# Patient Record
Sex: FEMALE | Race: WHITE | Employment: OTHER | URBAN - METROPOLITAN AREA
[De-identification: names, ages, dates, MRNs, and addresses within clinical notes are randomized per-mention and may not be internally consistent; named-entity substitution may affect disease eponyms.]

---

## 2017-01-18 ENCOUNTER — OFFICE VISIT (OUTPATIENT)
Dept: FAMILY MEDICINE CLINIC | Age: 62
End: 2017-01-18

## 2017-01-18 VITALS
DIASTOLIC BLOOD PRESSURE: 93 MMHG | HEIGHT: 65 IN | WEIGHT: 156 LBS | SYSTOLIC BLOOD PRESSURE: 121 MMHG | HEART RATE: 86 BPM | TEMPERATURE: 97.9 F | RESPIRATION RATE: 16 BRPM | BODY MASS INDEX: 25.99 KG/M2

## 2017-01-18 DIAGNOSIS — R22.9 LOCALIZED SKIN MASS, LUMP, OR SWELLING: Primary | ICD-10-CM

## 2017-01-18 RX ORDER — GABAPENTIN 100 MG/1
100 CAPSULE ORAL DAILY
COMMUNITY
Start: 2016-10-12 | End: 2017-10-31

## 2017-01-18 NOTE — MR AVS SNAPSHOT
Visit Information Date & Time Provider Department Dept. Phone Encounter #  
 1/18/2017  9:15 AM Luna RegaladoRoxanne 34 [id number] Upcoming Health Maintenance Date Due  
 BREAST CANCER SCRN MAMMOGRAM 11/30/2017 PAP AKA CERVICAL CYTOLOGY 10/19/2018 COLONOSCOPY 2/23/2020 DTaP/Tdap/Td series (2 - Td) 9/29/2025 Allergies as of 1/18/2017  Review Complete On: 1/18/2017 By: Luna Regalado MD  
 No Known Allergies Current Immunizations  Reviewed on 9/29/2015 Name Date Tdap 9/29/2015 Not reviewed this visit You Were Diagnosed With   
  
 Codes Comments Localized skin mass, lump, or swelling    -  Primary ICD-10-CM: R22.9 ICD-9-CM: 073. 2 Vitals BP Pulse Temp Resp Height(growth percentile) Weight(growth percentile) (!) 121/93 (BP 1 Location: Left arm, BP Patient Position: Sitting) 86 97.9 °F (36.6 °C) (Oral) 16 5' 5\" (1.651 m) 156 lb (70.8 kg) LMP BMI OB Status Smoking Status 01/01/2009 25.96 kg/m2 Postmenopausal Former Smoker BMI and BSA Data Body Mass Index Body Surface Area  
 25.96 kg/m 2 1.8 m 2 Preferred Pharmacy Pharmacy Name Phone [facility] [address], [address] [place] [address] Sara Carlines [phone] Your Updated Medication List  
  
   
This list is accurate as of: 1/18/17  9:54 AM.  Always use your most recent med list.  
  
  
  
  
 ALPRAZolam 0.25 mg tablet Commonly known as:  Kane Sequin Take 1 Tab by mouth two (2) times daily as needed for Anxiety. buPROPion  mg tablet Commonly known as:  Etwashington Pickler Take 1 tablet by mouth daily. CALCIUM + D PO Take  by mouth. cetirizine 10 mg tablet Commonly known as:  ZYRTEC  
TAKE ONE TABLET BY MOUTH DAILY AS NEEDED  
  
 gabapentin 100 mg capsule Commonly known as:  NEURONTIN Take 100 mg by mouth daily. simvastatin 40 mg tablet Commonly known as:  ZOCOR  
 TAKE ONE TABLET BY MOUTH EVERY NIGHT AS DIRECTED  
  
 venlafaxine-SR 75 mg capsule Commonly known as:  EFFEXOR-XR  
TAKE ONE CAPSULE BY MOUTH DAILY To-Do List   
 01/19/2017 Imaging:  US THYROID/PARATHYROID/SOFT TISS Introducing [facility]! Dear Jerardo Jason: Thank you for requesting a Roadmap account. Our records indicate that you have previously registered for a Roadmap account but its currently inactive. Please call our Roadmap support line at [phone]. Additional Information If you have questions, please visit the Frequently Asked Questions section of the Roadmap website at [email or web address] Remember, Roadmap is NOT to be used for urgent needs. For medical emergencies, dial 911. Now available from your iPhone and Android! Please provide this summary of care documentation to your next provider. Your primary care clinician is listed as Brionna Penn. If you have any questions after today's visit, please call [phone].

## 2017-01-24 ENCOUNTER — HOSPITAL ENCOUNTER (OUTPATIENT)
Dept: ULTRASOUND IMAGING | Age: 62
Discharge: HOME | End: 2017-01-24
Attending: FAMILY MEDICINE
Payer: OTHER GOVERNMENT

## 2017-01-24 ENCOUNTER — TELEPHONE (OUTPATIENT)
Dept: FAMILY MEDICINE CLINIC | Age: 62
End: 2017-01-24

## 2017-01-24 DIAGNOSIS — R22.9 LOCALIZED SKIN MASS, LUMP, OR SWELLING: ICD-10-CM

## 2017-01-24 PROCEDURE — 76536 US EXAM OF HEAD AND NECK: CPT

## 2017-01-25 NOTE — TELEPHONE ENCOUNTER
Please call patient and let her know that her ultrasound shows that the mass we were looking at was just fatty tissue.

## 2017-10-31 ENCOUNTER — OFFICE VISIT (OUTPATIENT)
Dept: FAMILY MEDICINE CLINIC | Age: 62
End: 2017-10-31

## 2017-10-31 VITALS
HEIGHT: 65 IN | SYSTOLIC BLOOD PRESSURE: 131 MMHG | TEMPERATURE: 99.1 F | HEART RATE: 94 BPM | DIASTOLIC BLOOD PRESSURE: 90 MMHG | BODY MASS INDEX: 26.52 KG/M2 | RESPIRATION RATE: 18 BRPM | WEIGHT: 159.2 LBS

## 2017-10-31 DIAGNOSIS — F41.9 ANXIETY: Primary | ICD-10-CM

## 2017-10-31 DIAGNOSIS — Z12.39 BREAST CANCER SCREENING: ICD-10-CM

## 2017-10-31 DIAGNOSIS — R03.0 ELEVATED BLOOD PRESSURE READING: ICD-10-CM

## 2017-10-31 DIAGNOSIS — F33.0 DEPRESSION, MAJOR, RECURRENT, MILD (HCC): ICD-10-CM

## 2017-10-31 RX ORDER — ALPRAZOLAM 0.25 MG/1
0.25 TABLET ORAL
Qty: 30 TAB | Refills: 0 | Status: SHIPPED | OUTPATIENT
Start: 2017-10-31 | End: 2020-02-07

## 2017-10-31 RX ORDER — VENLAFAXINE HYDROCHLORIDE 75 MG/1
CAPSULE, EXTENDED RELEASE ORAL
Qty: 30 CAP | Refills: 1 | Status: SHIPPED | OUTPATIENT
Start: 2017-10-31 | End: 2017-12-15 | Stop reason: SDUPTHER

## 2017-10-31 NOTE — PROGRESS NOTES
Chief Complaint   Patient presents with    Medication Evaluation     1. Have you been to the ER, urgent care clinic since your last visit? No  Hospitalized since your last visit? NO    2. Have you seen or consulted any other health care providers outside of the [address] [place] [address] since your last visit? Include any pap smears or colon screening.  No    Health Maintenance Due   Topic Date Due    Flu Vaccine  08/01/2017    Breast Cancer Screening  11/30/2017     Pt declined flu vaccine

## 2017-10-31 NOTE — PROGRESS NOTES
HISTORY OF PRESENT ILLNESS  Lolita Flores is a 62 y.o. female. Anxiety   The history is provided by the patient. This is a chronic problem. The current episode started more than 1 week ago. The problem occurs constantly. The problem has been gradually improving. Pertinent negatives include no chest pain and no shortness of breath. Exacerbated by: worrying about things. The symptoms are relieved by medications. Treatments tried: prn Xanax every few months. The treatment provided significant relief. Review of Systems   Constitutional: Negative for malaise/fatigue and weight loss. No weight gain   Respiratory: Negative for shortness of breath. Cardiovascular: Negative for chest pain and palpitations. Psychiatric/Behavioral: Positive for depression. Negative for substance abuse. The patient is nervous/anxious and has insomnia. She feels depressed several times a month       Visit Vitals    /90    Pulse 94    Temp 99.1 °F (37.3 °C) (Oral)    Resp 18    Ht 5' 5\" (1.651 m)    Wt 159 lb 3.2 oz (72.2 kg)    LMP 01/01/2009    BMI 26.49 kg/m2     BP Readings from Last 3 Encounters:   10/31/17 131/90   01/18/17 (!) 121/93   02/09/16 119/84     Physical Exam   Constitutional: She is oriented to person, place, and time. She appears well-developed and well-nourished. No distress. Neurological: She is alert and oriented to person, place, and time. She displays no tremor. Skin: She is not diaphoretic. Psychiatric: She has a normal mood and affect. Her behavior is normal. Judgment and thought content normal.       ASSESSMENT and PLAN    ICD-10-CM ICD-9-CM    1. Anxiety F41.9 300.00 venlafaxine-SR (EFFEXOR-XR) 75 mg capsule      ALPRAZolam (XANAX) 0.25 mg tablet   2. Depression, major, recurrent, mild (HCC) F33.0 296.31 venlafaxine-SR (EFFEXOR-XR) 75 mg capsule   3. Elevated blood pressure reading R03.0 796.2    4.  Breast cancer screening Z12.31 V76.10 JOLENE MAMMO BI SCREENING INCL CAD Anxiety, likely depression as well  Elevated blood pressure again  Restart Effexor  Xanax refilled  Mammogram     Follow-up Disposition:  Return in about 6 weeks (around 12/12/2017) for depression, anxiety, blood pressure check - fast for labs. Reviewed plan of care. Patient has provided input and agrees with goals.

## 2017-10-31 NOTE — MR AVS SNAPSHOT
Visit Information Date & Time Provider Department Dept. Phone Encounter #  
 10/31/2017  3:30 PM Daylin GivensRoxanne 34 [id number] Upcoming Health Maintenance Date Due  
 BREAST CANCER SCRN MAMMOGRAM 11/30/2017 PAP AKA CERVICAL CYTOLOGY 10/19/2018 COLONOSCOPY 2/23/2020 DTaP/Tdap/Td series (2 - Td) 9/29/2025 Allergies as of 10/31/2017  Review Complete On: 10/31/2017 By: Daylin Givens MD  
 No Known Allergies Current Immunizations  Reviewed on 9/29/2015 Name Date Tdap 9/29/2015 Not reviewed this visit You Were Diagnosed With   
  
 Codes Comments Anxiety    -  Primary ICD-10-CM: F41.9 ICD-9-CM: 300.00 Depression, major, recurrent, mild [facility]     ICD-10-CM: F33.0 ICD-9-CM: 296.31 Elevated blood pressure reading     ICD-10-CM: R03.0 ICD-9-CM: 796.2 Breast cancer screening     ICD-10-CM: Z12.31 
ICD-9-CM: V76.10 Vitals BP Pulse Temp Resp Height(growth percentile) Weight(growth percentile) 131/90 94 99.1 °F (37.3 °C) (Oral) 18 5' 5\" (1.651 m) 159 lb 3.2 oz (72.2 kg) LMP BMI OB Status Smoking Status 01/01/2009 26.49 kg/m2 Postmenopausal Former Smoker Vitals History BMI and BSA Data Body Mass Index Body Surface Area  
 26.49 kg/m 2 1.82 m 2 Preferred Pharmacy Pharmacy Name Phone Ntiin Guzman [address] [place] [address], [address] [place] [address] [phone] Your Updated Medication List  
  
   
This list is accurate as of: 10/31/17  4:19 PM.  Always use your most recent med list.  
  
  
  
  
 ALPRAZolam 0.25 mg tablet Commonly known as:  Rebbeca Lawn Take 1 Tab by mouth two (2) times daily as needed for Anxiety. CALCIUM + D PO Take  by mouth. cetirizine 10 mg tablet Commonly known as:  ZYRTEC  
TAKE ONE TABLET BY MOUTH DAILY AS NEEDED  
  
 venlafaxine-SR 75 mg capsule Commonly known as:  EFFEXOR-XR  
TAKE ONE CAPSULE BY MOUTH DAILY Prescriptions Printed Refills ALPRAZolam (XANAX) 0.25 mg tablet 0 Sig: Take 1 Tab by mouth two (2) times daily as needed for Anxiety. Class: Print Route: Oral  
  
Prescriptions Sent to Pharmacy Refills  
 venlafaxine-SR (EFFEXOR-XR) 75 mg capsule 1 Sig: TAKE ONE CAPSULE BY MOUTH DAILY Class: Normal  
 Pharmacy: [facility], [facility] #: [phone] To-Do List   
 10/31/2017 Imaging:  JOLENE MAMMO BI SCREENING INCL CAD Please provide this summary of care documentation to your next provider. Your primary care clinician is listed as Santino Hogue. If you have any questions after today's visit, please call [phone].

## 2017-11-27 ENCOUNTER — HOSPITAL ENCOUNTER (OUTPATIENT)
Dept: MAMMOGRAPHY | Age: 62
Discharge: HOME | End: 2017-11-27
Attending: FAMILY MEDICINE
Payer: OTHER GOVERNMENT

## 2017-11-27 DIAGNOSIS — Z12.39 BREAST CANCER SCREENING: ICD-10-CM

## 2017-11-27 PROCEDURE — 77067 SCR MAMMO BI INCL CAD: CPT

## 2017-12-15 ENCOUNTER — OFFICE VISIT (OUTPATIENT)
Dept: FAMILY MEDICINE CLINIC | Age: 62
End: 2017-12-15

## 2017-12-15 VITALS
SYSTOLIC BLOOD PRESSURE: 147 MMHG | RESPIRATION RATE: 18 BRPM | HEART RATE: 78 BPM | BODY MASS INDEX: 27.66 KG/M2 | DIASTOLIC BLOOD PRESSURE: 90 MMHG | HEIGHT: 65 IN | WEIGHT: 166 LBS | TEMPERATURE: 98.6 F

## 2017-12-15 DIAGNOSIS — F33.0 DEPRESSION, MAJOR, RECURRENT, MILD (HCC): Primary | ICD-10-CM

## 2017-12-15 DIAGNOSIS — Z13.0 SCREENING FOR BLOOD DISEASE: ICD-10-CM

## 2017-12-15 DIAGNOSIS — F41.9 ANXIETY: ICD-10-CM

## 2017-12-15 DIAGNOSIS — E78.00 PURE HYPERCHOLESTEROLEMIA: ICD-10-CM

## 2017-12-15 DIAGNOSIS — I10 ESSENTIAL HYPERTENSION: ICD-10-CM

## 2017-12-15 RX ORDER — VENLAFAXINE HYDROCHLORIDE 75 MG/1
CAPSULE, EXTENDED RELEASE ORAL
Qty: 90 CAP | Refills: 1 | Status: SHIPPED | OUTPATIENT
Start: 2017-12-15 | End: 2019-08-05 | Stop reason: SDUPTHER

## 2017-12-15 RX ORDER — AMLODIPINE BESYLATE 5 MG/1
5 TABLET ORAL DAILY
Qty: 30 TAB | Refills: 1 | Status: SHIPPED | OUTPATIENT
Start: 2017-12-15 | End: 2018-03-02 | Stop reason: SDUPTHER

## 2017-12-15 RX ORDER — CETIRIZINE HCL 10 MG
TABLET ORAL
Qty: 90 TAB | Status: SHIPPED | OUTPATIENT
Start: 2017-12-15 | End: 2021-07-12

## 2017-12-15 NOTE — PROGRESS NOTES
Chief Complaint   Patient presents with    Depression     anxiety 6 wk f/u     1. Have you been to the ER, urgent care clinic since your last visit? No  Hospitalized since your last visit? No    2. Have you seen or consulted any other health care providers outside of the [address] [place] [address] since your last visit? Include any pap smears or colon screening. No     There are no preventive care reminders to display for this patient.

## 2017-12-15 NOTE — PATIENT INSTRUCTIONS
DASH Diet: After Your Visit   Your Care Instructions   The DASH diet is an eating plan that can help lower your blood pressure. DASH stands for Dietary Approaches to Stop Hypertension. Hypertension is high blood pressure. The DASH diet focuses on eating foods that are high in calcium, potassium, and magnesium. These nutrients can lower blood pressure. The foods that are highest in these nutrients are fruits, vegetables, low-fat dairy products, nuts, seeds, and legumes. But taking calcium, potassium, and magnesium supplements instead of eating foods that are high in those nutrients does not have the same effect. The DASH diet also includes whole grains, fish, and poultry. The DASH diet is one of several lifestyle changes your doctor may recommend to lower your high blood pressure. Your doctor may also want you to decrease the amount of sodium in your diet. Lowering sodium while following the DASH diet can lower blood pressure even further than just the DASH diet alone. Follow-up care is a key part of your treatment and safety. Be sure to make and go to all appointments, and call your doctor if you are having problems. Its also a good idea to know your test results and keep a list of the medicines you take. How can you care for yourself at home? Following the DASH diet   Eat 4 to 5 servings of fruit each day. A serving is 1 medium-sized piece of fruit, 1/2 cup chopped or canned fruit, 1/4 cup dried fruit, or 4 ounces (1/2 cup) of fruit juice. Choose fruit more often than fruit juice. Eat 4 to 5 servings of vegetables each day. A serving is 1 cup of lettuce or raw leafy vegetables, 1/2 cup of chopped or cooked vegetables, or 4 ounces (1/2 cup) of vegetable juice. Choose vegetables more often than vegetable juice. Get 3 servings of low-fat dairy each day. A serving is 8 ounces of milk, 1 cup of yogurt, or 1 1/2 ounces of cheese. Eat 7 to 8 servings of grains each day.  A serving is 1 slice of bread, 1 ounce of dry cereal, or 1/2 cup of cooked rice, pasta, or cooked cereal. Try to choose whole-grain products as much as possible. Limit lean meat, poultry, and fish to 6 ounces each day. Six ounces is about the size of two decks of cards. Eat 4 to 5 servings of nuts, seeds, and legumes (cooked dried beans, lentils, and split peas) each week. A serving is 1/3 cup of nuts, 2 tablespoons of seeds, or 1/2 cup cooked dried beans or peas. Tips for success   Start small. Do not try to make dramatic changes to your diet all at once. You might feel that you are missing out on your favorite foods and then be more likely to not follow the plan. Make small changes, and stick with them. Once those changes become habit, add a few more changes. Try some of the following:   Make it a goal to eat a fruit or vegetable at every meal and at snacks. This will make it easy to get the recommended amount of fruits and vegetables each day. Try yogurt topped with fruit and nuts for a snack or healthy dessert. Add lettuce, tomato, cucumber, and onion to sandwiches. Combine a ready-made pizza crust with low-fat mozzarella cheese and lots of vegetable toppings. Try using tomatoes, squash, spinach, broccoli, carrots, cauliflower, and onions. Have a variety of cut-up vegetables with a low-fat dip as an appetizer instead of chips and dip. Sprinkle sunflower seeds or chopped almonds over salads. Or try adding chopped walnuts or almonds to cooked vegetables. Try some vegetarian meals using beans and peas. Add garbanzo or kidney beans to salads. Make burritos and tacos with mashed redd beans or black beans. Where can you learn more? Go to DealExplorer.be   Enter H967 in the search box to learn more about \"DASH Diet: After Your Visit. \"    © 5076-9674 Healthwise, [employer]. Care instructions adapted under license by [facility] (which disclaims liability or warranty for this information).  This care instruction is for use with your licensed healthcare professional. If you have questions about a medical condition or this instruction, always ask your healthcare professional. [address] any warranty or liability for your use of this information. Content Version: 9.2.810004; Last Revised: March 24, 2010         Learning About High Blood Pressure  What is high blood pressure? Blood pressure is a measure of how hard the blood pushes against the walls of your arteries. It's normal for blood pressure to go up and down throughout the day, but if it stays up, you have high blood pressure. Another name for high blood pressure is hypertension. Two numbers tell you your blood pressure. The first number is the systolic pressure. It shows how hard the blood pushes when your heart is pumping. The second number is the diastolic pressure. It shows how hard the blood pushes between heartbeats, when your heart is relaxed and filling with blood. A blood pressure of less than 120/80 (say \"120 over 80\") is ideal for an adult. High blood pressure is 140/90 or higher. You have high blood pressure if your top number is 140 or higher or your bottom number is 90 or higher, or both. Many people fall into the category in between, called prehypertension. People with prehypertension need to make lifestyle changes to bring their blood pressure down and help prevent or delay high blood pressure. What happens when you have high blood pressure? · Blood flows through your arteries with too much force. Over time, this damages the walls of your arteries. But you can't feel it. High blood pressure usually doesn't cause symptoms. · Fat and calcium start to build up in your arteries. This buildup is called plaque. Plaque makes your arteries narrower and stiffer. Blood can't flow through them as easily. · This lack of good blood flow starts to damage some of the organs in your body.  This can lead to problems such as coronary artery disease and heart attack, heart failure, stroke, kidney failure, and eye damage. How can you prevent high blood pressure? · Stay at a healthy weight. · Try to limit how much sodium you eat to less than 2,300 milligrams (mg) a day. If you limit your sodium to 1,500 mg a day, you can lower your blood pressure even more. ¨ Buy foods that are labeled \"unsalted,\" \"sodium-free,\" or \"low-sodium. \" Foods labeled \"reduced-sodium\" and \"light sodium\" may still have too much sodium. ¨ Flavor your food with garlic, lemon juice, onion, vinegar, herbs, and spices instead of salt. Do not use soy sauce, steak sauce, onion salt, garlic salt, mustard, or ketchup on your food. ¨ Use less salt (or none) when recipes call for it. You can often use half the salt a recipe calls for without losing flavor. · Be physically active. Get at least 30 minutes of exercise on most days of the week. Walking is a good choice. You also may want to do other activities, such as running, swimming, cycling, or playing tennis or team sports. · Limit alcohol to 2 drinks a day for men and 1 drink a day for women. · Eat plenty of fruits, vegetables, and low-fat dairy products. Eat less saturated and total fats. How is high blood pressure treated? · Your doctor will suggest making lifestyle changes. For example, your doctor may ask you to eat healthy foods, quit smoking, lose extra weight, and be more active. · If lifestyle changes don't help enough or your blood pressure is very high, you will have to take medicine every day. Follow-up care is a key part of your treatment and safety. Be sure to make and go to all appointments, and call your doctor if you are having problems. It's also a good idea to know your test results and keep a list of the medicines you take. Where can you learn more? Go to http://maddie-shiv.info/.   Enter P501 in the search box to learn more about \"Learning About High Blood Pressure. \"  Current as of: September 21, 2016  Content Version: 11.4  © 3058-3193 Healthwise, [facility]. Care instructions adapted under license by [employer] (which disclaims liability or warranty for this information). If you have questions about a medical condition or this instruction, always ask your healthcare professional. [place] [address] any warranty or liability for your use of this information.

## 2017-12-15 NOTE — MR AVS SNAPSHOT
Visit Information Date & Time Provider Department Dept. Phone Encounter #  
 12/15/2017 10:30 AM Luna RegaladoRoxanne 34 [id number] Follow-up Instructions Return in about 6 weeks (around 1/26/2018) for blood pressure. Upcoming Health Maintenance Date Due  
 PAP AKA CERVICAL CYTOLOGY 10/19/2018 COLONOSCOPY 2/23/2020 DTaP/Tdap/Td series (2 - Td) 9/29/2025 Allergies as of 12/15/2017  Review Complete On: 12/15/2017 By: Luna Regalado MD  
 No Known Allergies Current Immunizations  Reviewed on 9/29/2015 Name Date Tdap 9/29/2015 Not reviewed this visit You Were Diagnosed With   
  
 Codes Comments Depression, major, recurrent, mild ([facility]    -  Primary ICD-10-CM: F33.0 ICD-9-CM: 296.31 Anxiety     ICD-10-CM: F41.9 ICD-9-CM: 300.00 Essential hypertension     ICD-10-CM: I10 
ICD-9-CM: 401.9 Pure hypercholesterolemia     ICD-10-CM: E78.00 ICD-9-CM: 272.0 Screening for blood disease     ICD-10-CM: Z13.0 ICD-9-CM: V78.9 Vitals BP Pulse Temp Resp Height(growth percentile) Weight(growth percentile) 147/90 78 98.6 °F (37 °C) (Oral) 18 5' 5\" (1.651 m) 166 lb (75.3 kg) LMP BMI OB Status Smoking Status 01/01/2009 27.62 kg/m2 Postmenopausal Former Smoker Vitals History BMI and BSA Data Body Mass Index Body Surface Area  
 27.62 kg/m 2 1.86 m 2 Preferred Pharmacy Pharmacy Name Phone [facility] [address] [place] [address], [address] [place] [address] [phone] Your Updated Medication List  
  
   
This list is accurate as of: 12/15/17 11:58 AM.  Always use your most recent med list.  
  
  
  
  
 ALPRAZolam 0.25 mg tablet Commonly known as:  Kane Sequin Take 1 Tab by mouth two (2) times daily as needed for Anxiety. amLODIPine 5 mg tablet Commonly known as:  Tuan Ibarra Take 1 Tab by mouth daily. CALCIUM + D PO Take  by mouth. cetirizine 10 mg tablet Commonly known as:  ZYRTEC  
TAKE ONE TABLET BY MOUTH DAILY AS NEEDED  
  
 venlafaxine-SR 75 mg capsule Commonly known as:  EFFEXOR-XR  
TAKE ONE CAPSULE BY MOUTH DAILY Prescriptions Sent to Pharmacy Refills  
 amLODIPine (NORVASC) 5 mg tablet 1 Sig: Take 1 Tab by mouth daily. Class: Normal  
 [facility]acy: [facility] [address], [address] [place] [address] Ph #: [phone] Route: Oral  
 venlafaxine-SR (EFFEXOR-XR) 75 mg capsule 1 Sig: TAKE ONE CAPSULE BY MOUTH DAILY Class: Normal  
 [facility]acy: [facility] [address] Ph #: [phone]  
 cetirizine (ZYRTEC) 10 mg tablet PRN Sig: TAKE ONE TABLET BY MOUTH DAILY AS NEEDED Class: Normal  
 Pharmacy: [facility] [address], [address] [place] [address] Ph #: [phone] We Performed the Following CBC WITH AUTOMATED DIFF [81200 CPT(R)] LIPID PANEL [42116 CPT(R)] METABOLIC PANEL, COMPREHENSIVE [35166 CPT(R)] Follow-up Instructions Return in about 6 weeks (around 1/26/2018) for blood pressure. Patient Instructions DASH Diet: After Your Visit Your Care Instructions The DASH diet is an eating plan that can help lower your blood pressure. DASH stands for Dietary Approaches to Stop Hypertension. Hypertension is high blood pressure. The DASH diet focuses on eating foods that are high in calcium, potassium, and magnesium. These nutrients can lower blood pressure. The foods that are highest in these nutrients are fruits, vegetables, low-fat dairy products, nuts, seeds, and legumes. But taking calcium, potassium, and magnesium supplements instead of eating foods that are high in those nutrients does not have the same effect. The DASH diet also includes whole grains, fish, and poultry.   
The DASH diet is one of several lifestyle changes your doctor may recommend to lower your high blood pressure. Your doctor may also want you to decrease the amount of sodium in your diet. Lowering sodium while following the DASH diet can lower blood pressure even further than just the DASH diet alone. Follow-up care is a key part of your treatment and safety. Be sure to make and go to all appointments, and call your doctor if you are having problems. Its also a good idea to know your test results and keep a list of the medicines you take. How can you care for yourself at home? Following the DASH diet Eat 4 to 5 servings of fruit each day. A serving is 1 medium-sized piece of fruit, 1/2 cup chopped or canned fruit, 1/4 cup dried fruit, or 4 ounces (1/2 cup) of fruit juice. Choose fruit more often than fruit juice. Eat 4 to 5 servings of vegetables each day. A serving is 1 cup of lettuce or raw leafy vegetables, 1/2 cup of chopped or cooked vegetables, or 4 ounces (1/2 cup) of vegetable juice. Choose vegetables more often than vegetable juice. Get 3 servings of low-fat dairy each day. A serving is 8 ounces of milk, 1 cup of yogurt, or 1 1/2 ounces of cheese. Eat 7 to 8 servings of grains each day. A serving is 1 slice of bread, 1 ounce of dry cereal, or 1/2 cup of cooked rice, pasta, or cooked cereal. Try to choose whole-grain products as much as possible. Limit lean meat, poultry, and fish to 6 ounces each day. Six ounces is about the size of two decks of cards. Eat 4 to 5 servings of nuts, seeds, and legumes (cooked dried beans, lentils, and split peas) each week. A serving is 1/3 cup of nuts, 2 tablespoons of seeds, or 1/2 cup cooked dried beans or peas. Tips for success Start small. Do not try to make dramatic changes to your diet all at once. You might feel that you are missing out on your favorite foods and then be more likely to not follow the plan. Make small changes, and stick with them. Once those changes become habit, add a few more changes. Try some of the following:  
Make it a goal to eat a fruit or vegetable at every meal and at snacks. This will make it easy to get the recommended amount of fruits and vegetables each day. Try yogurt topped with fruit and nuts for a snack or healthy dessert. Add lettuce, tomato, cucumber, and onion to sandwiches. Combine a ready-made pizza crust with low-fat mozzarella cheese and lots of vegetable toppings. Try using tomatoes, squash, spinach, broccoli, carrots, cauliflower, and onions. Have a variety of cut-up vegetables with a low-fat dip as an appetizer instead of chips and dip. Sprinkle sunflower seeds or chopped almonds over salads. Or try adding chopped walnuts or almonds to cooked vegetables. Try some vegetarian meals using beans and peas. Add garbanzo or kidney beans to salads. Make burritos and tacos with mashed redd beans or black beans. Where can you learn more? Go to [employer].be Enter P675 in the search box to learn more about \"DASH Diet: After Your Visit. \"   
© 0426-5554 Healthwise, Incorporated. Care instructions adapted under license by [facility] [employer] (which disclaims liability or warranty for this information). This care instruction is for use with your licensed healthcare professional. If you have questions about a medical condition or this instruction, always ask your healthcare professional. [place] [address] any warranty or liability for your use of this information. Content Version: 9.6.157358; Last Revised: March 24, 2010 Learning About High Blood Pressure What is high blood pressure? Blood pressure is a measure of how hard the blood pushes against the walls of your arteries. It's normal for blood pressure to go up and down throughout the day, but if it stays up, you have high blood pressure. Another name for high blood pressure is hypertension. Two numbers tell you your blood pressure.  The first number is the systolic pressure. It shows how hard the blood pushes when your heart is pumping. The second number is the diastolic pressure. It shows how hard the blood pushes between heartbeats, when your heart is relaxed and filling with blood. A blood pressure of less than 120/80 (say \"120 over 80\") is ideal for an adult. High blood pressure is 140/90 or higher. You have high blood pressure if your top number is 140 or higher or your bottom number is 90 or higher, or both. Many people fall into the category in between, called prehypertension. People with prehypertension need to make lifestyle changes to bring their blood pressure down and help prevent or delay high blood pressure. What happens when you have high blood pressure? · Blood flows through your arteries with too much force. Over time, this damages the walls of your arteries. But you can't feel it. High blood pressure usually doesn't cause symptoms. · Fat and calcium start to build up in your arteries. This buildup is called plaque. Plaque makes your arteries narrower and stiffer. Blood can't flow through them as easily. · This lack of good blood flow starts to damage some of the organs in your body. This can lead to problems such as coronary artery disease and heart attack, heart failure, stroke, kidney failure, and eye damage. How can you prevent high blood pressure? · Stay at a healthy weight. · Try to limit how much sodium you eat to less than 2,300 milligrams (mg) a day. If you limit your sodium to 1,500 mg a day, you can lower your blood pressure even more. ¨ Buy foods that are labeled \"unsalted,\" \"sodium-free,\" or \"low-sodium. \" Foods labeled \"reduced-sodium\" and \"light sodium\" may still have too much sodium. ¨ Flavor your food with garlic, lemon juice, onion, vinegar, herbs, and spices instead of salt. Do not use soy sauce, steak sauce, onion salt, garlic salt, mustard, or ketchup on your food. ¨ Use less salt (or none) when recipes call for it. You can often use half the salt a recipe calls for without losing flavor. · Be physically active. Get at least 30 minutes of exercise on most days of the week. Walking is a good choice. You also may want to do other activities, such as running, swimming, cycling, or playing tennis or team sports. · Limit alcohol to 2 drinks a day for men and 1 drink a day for women. · Eat plenty of fruits, vegetables, and low-fat dairy products. Eat less saturated and total fats. How is high blood pressure treated? · Your doctor will suggest making lifestyle changes. For example, your doctor may ask you to eat healthy foods, quit smoking, lose extra weight, and be more active. · If lifestyle changes don't help enough or your blood pressure is very high, you will have to take medicine every day. Follow-up care is a key part of your treatment and safety. Be sure to make and go to all appointments, and call your doctor if you are having problems. It's also a good idea to know your test results and keep a list of the medicines you take. Where can you learn more? Go to http://maddie-shiv.info/. Enter P501 in the search box to learn more about \"Learning About High Blood Pressure. \" Current as of: September 21, 2016 Content Version: 11.4 © 9590-3893 Healthwise, Incorporated. Care instructions adapted under license by [employer] (which disclaims liability or warranty for this information). If you have questions about a medical condition or this instruction, always ask your healthcare professional. [place] [address] any warranty or liability for your use of this information. Please provide this summary of care documentation to your next provider. Your primary care clinician is listed as Maxine Ortega. If you have any questions after today's visit, please call [phone].

## 2017-12-15 NOTE — PROGRESS NOTES
HISTORY OF PRESENT ILLNESS  Joel Zhang is a 65 y.o. female. Depression   History provided by: and anxiety. She hasn't needed and Xanax since her last visit. This is a chronic problem. The current episode started more than 1 week ago. The problem occurs constantly. The problem has been gradually improving. Pertinent negatives include no chest pain and no shortness of breath. Exacerbated by: work, the holidays. The symptoms are relieved by medications. Treatments tried: Effexor. The treatment provided significant relief. Other   The history is provided by the patient (this is the third time her blood pressure has been elevated). Pertinent negatives include no chest pain and no shortness of breath. Review of Systems   Constitutional: Negative for weight loss. Respiratory: Negative for shortness of breath. Cardiovascular: Negative for chest pain and palpitations. Psychiatric/Behavioral: Negative for depression and substance abuse. The patient is not nervous/anxious. Visit Vitals    /90    Pulse 78    Temp 98.6 °F (37 °C) (Oral)    Resp 18    Ht 5' 5\" (1.651 m)    Wt 166 lb (75.3 kg)    LMP 01/01/2009    BMI 27.62 kg/m2     BP Readings from Last 3 Encounters:   12/15/17 147/90   10/31/17 131/90   01/18/17 (!) 121/93     Physical Exam   Constitutional: She is oriented to person, place, and time. She appears well-developed and well-nourished. No distress. Neurological: She is alert and oriented to person, place, and time. She displays no tremor. Skin: She is not diaphoretic. Psychiatric: She has a normal mood and affect. Her behavior is normal. Judgment and thought content normal.       ASSESSMENT and PLAN    ICD-10-CM ICD-9-CM    1. Depression, major, recurrent, mild (HCC) F33.0 296.31 venlafaxine-SR (EFFEXOR-XR) 75 mg capsule   2. Anxiety F41.9 300.00 venlafaxine-SR (EFFEXOR-XR) 75 mg capsule   3.  Essential hypertension E37 478.9 METABOLIC PANEL, COMPREHENSIVE amLODIPine (NORVASC) 5 mg tablet   4. Pure hypercholesterolemia Y00.10 625.8 METABOLIC PANEL, COMPREHENSIVE      LIPID PANEL   5. Screening for blood disease Z13.0 V78.9 CBC WITH AUTOMATED DIFF        Depression doing well  Newly diagnosed HTN  Continue Effexor  Start Novasc  Labs per orders. Follow-up Disposition:  Return in about 6 weeks (around 1/26/2018) for blood pressure. Reviewed plan of care. Patient has provided input and agrees with goals.

## 2017-12-16 LAB
ALBUMIN SERPL-MCNC: 4.4 G/DL (ref 3.6–4.8)
ALBUMIN/GLOB SERPL: 1.8 {RATIO} (ref 1.2–2.2)
ALP SERPL-CCNC: 74 IU/L (ref 39–117)
ALT SERPL-CCNC: 31 IU/L (ref 0–32)
AST SERPL-CCNC: 33 IU/L (ref 0–40)
BASOPHILS # BLD AUTO: 0.1 X10E3/UL (ref 0–0.2)
BASOPHILS NFR BLD AUTO: 1 %
BILIRUB SERPL-MCNC: 0.6 MG/DL (ref 0–1.2)
BUN SERPL-MCNC: 12 MG/DL (ref 8–27)
BUN/CREAT SERPL: 21 (ref 12–28)
CALCIUM SERPL-MCNC: 9.2 MG/DL (ref 8.7–10.3)
CHLORIDE SERPL-SCNC: 103 MMOL/L (ref 96–106)
CHOLEST SERPL-MCNC: 257 MG/DL (ref 100–199)
CO2 SERPL-SCNC: 25 MMOL/L (ref 18–29)
CREAT SERPL-MCNC: 0.58 MG/DL (ref 0.57–1)
EOSINOPHIL # BLD AUTO: 0.1 X10E3/UL (ref 0–0.4)
EOSINOPHIL NFR BLD AUTO: 3 %
ERYTHROCYTE [DISTWIDTH] IN BLOOD BY AUTOMATED COUNT: 13.1 % (ref 12.3–15.4)
GFR SERPLBLD CREATININE-BSD FMLA CKD-EPI: 100 ML/MIN/1.73
GFR SERPLBLD CREATININE-BSD FMLA CKD-EPI: 115 ML/MIN/1.73
GLOBULIN SER CALC-MCNC: 2.5 G/DL (ref 1.5–4.5)
GLUCOSE SERPL-MCNC: 97 MG/DL (ref 65–99)
HCT VFR BLD AUTO: 44.8 % (ref 34–46.6)
HDLC SERPL-MCNC: 106 MG/DL
HGB BLD-MCNC: 14.4 G/DL (ref 11.1–15.9)
IMM GRANULOCYTES # BLD: 0 X10E3/UL (ref 0–0.1)
IMM GRANULOCYTES NFR BLD: 0 %
INTERPRETATION, 910389: NORMAL
LDLC SERPL CALC-MCNC: 129 MG/DL (ref 0–99)
LYMPHOCYTES # BLD AUTO: 1.2 X10E3/UL (ref 0.7–3.1)
LYMPHOCYTES NFR BLD AUTO: 24 %
MCH RBC QN AUTO: 32.3 PG (ref 26.6–33)
MCHC RBC AUTO-ENTMCNC: 32.1 G/DL (ref 31.5–35.7)
MCV RBC AUTO: 100 FL (ref 79–97)
MONOCYTES # BLD AUTO: 0.6 X10E3/UL (ref 0.1–0.9)
MONOCYTES NFR BLD AUTO: 11 %
NEUTROPHILS # BLD AUTO: 3 X10E3/UL (ref 1.4–7)
NEUTROPHILS NFR BLD AUTO: 61 %
PLATELET # BLD AUTO: 279 X10E3/UL (ref 150–379)
POTASSIUM SERPL-SCNC: 4.3 MMOL/L (ref 3.5–5.2)
PROT SERPL-MCNC: 6.9 G/DL (ref 6–8.5)
RBC # BLD AUTO: 4.46 X10E6/UL (ref 3.77–5.28)
SODIUM SERPL-SCNC: 142 MMOL/L (ref 134–144)
TRIGL SERPL-MCNC: 112 MG/DL (ref 0–149)
VLDLC SERPL CALC-MCNC: 22 MG/DL (ref 5–40)
WBC # BLD AUTO: 4.9 X10E3/UL (ref 3.4–10.8)

## 2018-01-26 ENCOUNTER — OFFICE VISIT (OUTPATIENT)
Dept: FAMILY MEDICINE CLINIC | Age: 63
End: 2018-01-26

## 2018-01-26 VITALS
HEART RATE: 82 BPM | WEIGHT: 158 LBS | BODY MASS INDEX: 26.33 KG/M2 | HEIGHT: 65 IN | TEMPERATURE: 98.5 F | RESPIRATION RATE: 18 BRPM | DIASTOLIC BLOOD PRESSURE: 83 MMHG | SYSTOLIC BLOOD PRESSURE: 128 MMHG

## 2018-01-26 DIAGNOSIS — I10 ESSENTIAL HYPERTENSION: Primary | ICD-10-CM

## 2018-01-26 NOTE — PROGRESS NOTES
Chief Complaint   Patient presents with    Blood Pressure Check     6 wk f/u     1. Have you been to the ER, urgent care clinic since your last visit? No   Hospitalized since your last visit? No     2. Have you seen or consulted any other health care providers outside of the [address] [place] [address] since your last visit? Include any pap smears or colon screening. No    There are no preventive care reminders to display for this patient.

## 2018-01-26 NOTE — MR AVS SNAPSHOT
[address] [place] [phone] Patient: Jane Garcia MRN: [id number] :1955 Visit Information Date & Time Provider Department Dept. Phone Encounter #  
 2018  2:00 PM Roberto Chance MD [address] [id number] Upcoming Health Maintenance Date Due  
 PAP AKA CERVICAL CYTOLOGY 10/19/2018 BREAST CANCER SCRN MAMMOGRAM 2019 COLONOSCOPY 2020 DTaP/Tdap/Td series (2 - Td) 2025 Allergies as of 2018  Review Complete On: 2018 By: Roberto Chance MD  
 No Known Allergies Current Immunizations  Reviewed on 2015 Name Date Tdap 2015 Not reviewed this visit You Were Diagnosed With   
  
 Codes Comments Essential hypertension    -  Primary ICD-10-CM: I10 
ICD-9-CM: 401.9 Vitals BP Pulse Temp Resp Height(growth percentile) Weight(growth percentile) 128/83 82 98.5 °F (36.9 °C) (Oral) 18 5' 5\" (1.651 m) 158 lb (71.7 kg) LMP BMI OB Status Smoking Status 2009 26.29 kg/m2 Postmenopausal Former Smoker Vitals History BMI and BSA Data Body Mass Index Body Surface Area  
 26.29 kg/m 2 1.81 m 2 Preferred Pharmacy Pharmacy Name Phone [facility] [address] [phone] Your Updated Medication List  
  
   
This list is accurate as of: 18  2:30 PM.  Always use your most recent med list.  
  
  
  
  
 ALPRAZolam 0.25 mg tablet Commonly known as:  Margaretta Ditch Take 1 Tab by mouth two (2) times daily as needed for Anxiety. amLODIPine 5 mg tablet Commonly known as:  Chinyere Parish Take 1 Tab by mouth daily. CALCIUM + D PO Take  by mouth. cetirizine 10 mg tablet Commonly known as:  ZYRTEC  
TAKE ONE TABLET BY MOUTH DAILY AS NEEDED  
  
 venlafaxine-SR 75 mg capsule Commonly known as:  EFFEXOR-XR  
 TAKE ONE CAPSULE BY MOUTH DAILY Introducing \[facility]\"" & HEALTH SERVICES! Dear Dedra Vays: Thank you for requesting a [employer] account. Our records indicate that you have previously registered for a [employer] account but its currently inactive. Please call our [employer] support line at [phone]. Additional Information If you have questions, please visit the Frequently Asked Questions section of the [employer] website at [email or web address] Remember, [employer] is NOT to be used for urgent needs. For medical emergencies, dial 911. Now available from your iPhone and Android! Please provide this summary of care documentation to your next provider. Your primary care clinician is listed as Kriss Rizo. If you have any questions after today's visit, please call [phone].

## 2018-01-26 NOTE — PROGRESS NOTES
HISTORY OF PRESENT ILLNESS  Bushra Buchanan is a 65 y.o. female. Blood Pressure Check   The history is provided by the patient. This is a chronic problem. The current episode started more than 1 week ago. The problem occurs constantly. The problem has been gradually improving. Pertinent negatives include no chest pain, no abdominal pain, no headaches and no shortness of breath. Nothing aggravates the symptoms. The symptoms are relieved by medications. Treatments tried: Norvasc. The treatment provided significant relief. Review of Systems   Constitutional: Negative for weight loss. No weight gain   Eyes: Negative for blurred vision. Respiratory: Negative for shortness of breath. Cardiovascular: Negative for chest pain and leg swelling. Gastrointestinal: Negative for abdominal pain. Neurological: Negative for dizziness, sensory change, speech change, focal weakness and headaches. Visit Vitals    /83    Pulse 82    Temp 98.5 °F (36.9 °C) (Oral)    Resp 18    Ht 5' 5\" (1.651 m)    Wt 158 lb (71.7 kg)    LMP 01/01/2009    BMI 26.29 kg/m2     BP Readings from Last 3 Encounters:   01/26/18 128/83   12/15/17 147/90   10/31/17 131/90     Physical Exam   Constitutional: She is oriented to person, place, and time. She appears well-developed and well-nourished. No distress. Cardiovascular: Normal rate, regular rhythm and normal heart sounds. Exam reveals no gallop and no friction rub. No murmur heard. Pulmonary/Chest: Effort normal and breath sounds normal. No respiratory distress. She has no wheezes. She has no rales. Musculoskeletal: She exhibits no edema. Neurological: She is alert and oriented to person, place, and time. Skin: Skin is warm and dry. She is not diaphoretic. Nursing note and vitals reviewed. ASSESSMENT and PLAN    ICD-10-CM ICD-9-CM    1. Essential hypertension I10 401.9         Blood pressure controlled  Continue current plans.     Follow-up Disposition:  Return in about 6 months (around 7/26/2018) for blood pressure. Reviewed plan of care. Patient has provided input and agrees with goals.

## 2018-03-02 DIAGNOSIS — I10 ESSENTIAL HYPERTENSION: ICD-10-CM

## 2018-03-04 RX ORDER — AMLODIPINE BESYLATE 5 MG/1
TABLET ORAL
Qty: 30 TAB | Refills: 10 | Status: SHIPPED | OUTPATIENT
Start: 2018-03-04 | End: 2019-04-26 | Stop reason: SDUPTHER

## 2018-05-21 ENCOUNTER — TELEPHONE (OUTPATIENT)
Dept: FAMILY MEDICINE CLINIC | Age: 63
End: 2018-05-21

## 2018-05-21 DIAGNOSIS — F41.9 ANXIETY: Primary | ICD-10-CM

## 2018-05-21 NOTE — TELEPHONE ENCOUNTER
Called pt and left message requesting call back to advise if she's scheduled appointment with Dr. Jesús luis.  Tyrell

## 2018-05-31 ENCOUNTER — TELEPHONE (OUTPATIENT)
Dept: FAMILY MEDICINE CLINIC | Age: 63
End: 2018-05-31

## 2018-05-31 NOTE — TELEPHONE ENCOUNTER
Referral has not been done, since pt was to call back once she scheduled appointment. Left message for pt to call office 5/31/18. [facility]     Pt returned call and advised she received referral from Dr. Marlen Nelson by mail which was to a pediatrician instead of psychiatrist. Jovana Walker pt this will be corrected in our system and referral will be submitted to [facility].  Tyrell

## 2018-05-31 NOTE — TELEPHONE ENCOUNTER
Psychiatry- Anxiety   Dr. Nena Fabian. Noel Del Rosario  [phone]       Called pt, and she states she has not, yet scheduled her appointment. Pt states she will schedule her appointment and call the office back so referral can be obtained.

## 2018-05-31 NOTE — TELEPHONE ENCOUNTER
----- Message from Sheryle Cristal sent at 5/31/2018 10:19 AM EDT -----  Regarding: Dr Elysia Cao would like to speak to the referral person, regarding a referral that was done for the wrong doctor, please call pt at [phone].

## 2018-06-07 NOTE — TELEPHONE ENCOUNTER
Referral submitted to [facility], approved for 38 visits, effective 6/8/18 - 6/18/19, authorization number [id number], and pt advised. Unable to locate office fax number for Dr. Virgin Dubin, but left message on his office voicemail to call office with fax number. Pt also agrees to call us back or have her daughter call with it if able to locate appointment card.  Tyrell

## 2018-06-07 NOTE — TELEPHONE ENCOUNTER
Pt's referral for Pankaj Marlow submitted to [facility], approved for 38 visits, effective 6/8/18 - 6/8/19, authorization number [id number], and mailed to office 6/8/18. aline

## 2018-08-21 ENCOUNTER — OFFICE VISIT (OUTPATIENT)
Dept: FAMILY MEDICINE CLINIC | Age: 63
End: 2018-08-21

## 2018-08-21 VITALS
HEART RATE: 88 BPM | DIASTOLIC BLOOD PRESSURE: 73 MMHG | RESPIRATION RATE: 18 BRPM | BODY MASS INDEX: 25.83 KG/M2 | TEMPERATURE: 98.2 F | WEIGHT: 155 LBS | SYSTOLIC BLOOD PRESSURE: 100 MMHG | HEIGHT: 65 IN

## 2018-08-21 DIAGNOSIS — I10 ESSENTIAL HYPERTENSION: Primary | ICD-10-CM

## 2018-08-21 NOTE — MR AVS SNAPSHOT
[address] 
[phone] Patient: Herman Oconnell MRN: [id number] :1955 Visit Information Date & Time Provider Department Dept. Phone Encounter #  
 2018  3:00 PM Susan Perez MD [facility] 34 [id number] Upcoming Health Maintenance Date Due Influenza Age 5 to Adult 2018 PAP AKA CERVICAL CYTOLOGY 10/19/2018 BREAST CANCER SCRN MAMMOGRAM 2019 COLONOSCOPY 2020 DTaP/Tdap/Td series (2 - Td) 2025 Allergies as of 2018  Review Complete On: 2018 By: Susan Perez MD  
 No Known Allergies Current Immunizations  Reviewed on 2015 Name Date Tdap 2015 Not reviewed this visit You Were Diagnosed With   
  
 Codes Comments Essential hypertension    -  Primary ICD-10-CM: I10 
ICD-9-CM: 401.9 Vitals BP Pulse Temp Resp Height(growth percentile) Weight(growth percentile) 100/73 88 98.2 °F (36.8 °C) (Oral) 18 5' 5\" (1.651 m) 155 lb (70.3 kg) LMP BMI OB Status Smoking Status 2009 25.79 kg/m2 Postmenopausal Former Smoker Vitals History BMI and BSA Data Body Mass Index Body Surface Area 25.79 kg/m 2 1.8 m 2 Preferred Pharmacy Pharmacy Name Phone [facility] [address] [place] [address], [address] [place] [address] [phone] Your Updated Medication List  
  
   
This list is accurate as of 18  3:51 PM.  Always use your most recent med list.  
  
  
  
  
 ALPRAZolam 0.25 mg tablet Commonly known as:  Wendall Phan Take 1 Tab by mouth two (2) times daily as needed for Anxiety. amLODIPine 5 mg tablet Commonly known as:  Love Breach TAKE ONE TABLET BY MOUTH DAILY CALCIUM + D PO Take  by mouth. cetirizine 10 mg tablet Commonly known as:  ZYRTEC  
TAKE ONE TABLET BY MOUTH DAILY AS NEEDED  
  
 venlafaxine-SR 75 mg capsule Commonly known as:  EFFEXOR-XR  
TAKE ONE CAPSULE BY MOUTH DAILY We Performed the Following METABOLIC PANEL, BASIC [80661 CPT(R)] Introducing [facility] & HEALTH SERVICES! Dear Marija Abdi: Thank you for requesting a [employer] account. Our records indicate that you have previously registered for a [employer] account but its currently inactive. Please call our [employer] support line at [phone]. Additional Information If you have questions, please visit the Frequently Asked Questions section of the [employer] website at [email or web address] Remember, [employer] is NOT to be used for urgent needs. For medical emergencies, dial 911. Now available from your iPhone and Android! Please provide this summary of care documentation to your next provider. Your primary care clinician is listed as Eveline Mirza. If you have any questions after today's visit, please call [phone].

## 2018-08-21 NOTE — PROGRESS NOTES
HISTORY OF PRESENT ILLNESS  Kamaljit Martin is a 65 y.o. female. Hypertension   The history is provided by the patient. This is a chronic problem. The current episode started more than 1 week ago. The problem occurs constantly. The problem has not changed since onset. Pertinent negatives include no chest pain, no abdominal pain, no headaches and no shortness of breath. Nothing aggravates the symptoms. The symptoms are relieved by medications. Treatments tried: Norvasc. The treatment provided significant relief. Review of Systems   Constitutional: Negative for weight loss. No weight gain   Eyes: Negative for blurred vision. Respiratory: Negative for shortness of breath. Cardiovascular: Negative for chest pain and leg swelling. Gastrointestinal: Negative for abdominal pain. Neurological: Negative for dizziness, sensory change, speech change, focal weakness and headaches. Visit Vitals    /73    Pulse 88    Temp 98.2 °F (36.8 °C) (Oral)    Resp 18    Ht 5' 5\" (1.651 m)    Wt 155 lb (70.3 kg)    LMP 01/01/2009    BMI 25.79 kg/m2     BP Readings from Last 3 Encounters:   08/21/18 100/73   01/26/18 128/83   12/15/17 147/90     Physical Exam   Constitutional: She is oriented to person, place, and time. She appears well-developed and well-nourished. No distress. Cardiovascular: Normal rate, regular rhythm and normal heart sounds. Exam reveals no gallop and no friction rub. No murmur heard. Pulmonary/Chest: Effort normal and breath sounds normal. No respiratory distress. She has no wheezes. She has no rales. Musculoskeletal: She exhibits no edema. Neurological: She is alert and oriented to person, place, and time. Skin: Skin is warm and dry. She is not diaphoretic. Nursing note and vitals reviewed. ASSESSMENT and PLAN    ICD-10-CM ICD-9-CM    1. Essential hypertension Q55 187.9 METABOLIC PANEL, BASIC        Blood pressure controlled  Labs per orders.   Continue current plans. Follow-up Disposition:  Return in about 6 months (around 2/21/2019) for physical, PAP. Reviewed plan of care. Patient has provided input and agrees with goals.

## 2018-08-21 NOTE — PROGRESS NOTES
Chief Complaint   Patient presents with    Hypertension     6 mo f/u     1. Have you been to the ER, urgent care clinic since your last visit? No  Hospitalized since your last visit? No     2. Have you seen or consulted any other health care providers outside of the [address] [place] [address] since your last visit? Include any pap smears or colon screening.  No

## 2019-02-19 ENCOUNTER — OFFICE VISIT (OUTPATIENT)
Dept: FAMILY MEDICINE CLINIC | Age: 64
End: 2019-02-19

## 2019-02-19 VITALS
HEART RATE: 81 BPM | DIASTOLIC BLOOD PRESSURE: 97 MMHG | BODY MASS INDEX: 26.66 KG/M2 | TEMPERATURE: 98.2 F | RESPIRATION RATE: 18 BRPM | WEIGHT: 160 LBS | HEIGHT: 65 IN | SYSTOLIC BLOOD PRESSURE: 145 MMHG

## 2019-02-19 DIAGNOSIS — Z00.00 ROUTINE GENERAL MEDICAL EXAMINATION AT A HEALTH CARE FACILITY: Primary | ICD-10-CM

## 2019-02-19 DIAGNOSIS — F41.9 ANXIETY: ICD-10-CM

## 2019-02-19 DIAGNOSIS — E78.00 PURE HYPERCHOLESTEROLEMIA: ICD-10-CM

## 2019-02-19 DIAGNOSIS — M85.80 OSTEOPENIA, UNSPECIFIED LOCATION: ICD-10-CM

## 2019-02-19 DIAGNOSIS — L60.0 INGROWN TOENAIL OF LEFT FOOT: ICD-10-CM

## 2019-02-19 DIAGNOSIS — F33.0 DEPRESSION, MAJOR, RECURRENT, MILD (HCC): ICD-10-CM

## 2019-02-19 DIAGNOSIS — I10 ESSENTIAL HYPERTENSION: ICD-10-CM

## 2019-02-19 NOTE — PROGRESS NOTES
Chief Complaint   Patient presents with    Well Woman    Toe Pain     left     1. Have you been to the ER, urgent care clinic since your last visit? Hospitalized since your last visit? No     2. Have you seen or consulted any other health care providers outside of the [address] [place] [address] since your last visit? Include any pap smears or colon screening. No     Pt declines shingles vaccine.

## 2019-02-19 NOTE — PROGRESS NOTES
Subjective:  Benitez Diaz is a 59 y.o. female here for annual physical exam.  She had a negative PAP/HPV in 2015. Also, her left great toenail is ingrown and painful when wearing shoes. Health Habits. Lifestyle:  Occupation:  , parttime  Household members:  4, patient, daughter, her boyfriend, son  Last dental appointment:   6 months ago  Last eye exam:  About 2 years ago  Uses seatbelts regularly :  yes  Getting regular exercise:  no  Last colonoscopy:   2/2010  Last mammogram:  11/2017       Patient Active Problem List   Diagnosis Code    Osteopenia M85.80    Anxiety F41.9    Osteoarthritis of right knee M17.11    Hot flashes R23.2    Depression, major, recurrent, mild ([address] F33.0    Hypercholesterolemia E78.00    Allergic conjunctivitis of both eyes H10.13    Essential hypertension I10     Past Medical History:   Diagnosis Date    Allergic conjunctivitis of both eyes 10/19/2015    Depression 9/11/2014    Essential hypertension 12/15/2017    Hot flashes 6/23/2014    Hot flashes     Hypercholesterolemia     Osteoarthritis of right knee 3/11/2014    Osteoarthritis of right knee 3/11/2014     Past Surgical History:   Procedure Laterality Date    ENDOSCOPY, COLON, DIAGNOSTIC      2 /10 normal      Family History   Problem Relation Age of Onset    Elevated Lipids Mother     Elevated Lipids Father     Hypertension Father     Elevated Lipids Sister      Social History     Tobacco Use    Smoking status: Former Smoker     Types: Cigarettes    Smokeless tobacco: Never Used    Tobacco comment: for only a couple of years in her late teens   Substance Use Topics    Alcohol use: Yes     No Known Allergies  Current Outpatient Medications   Medication Sig Dispense Refill    amLODIPine (NORVASC) 5 mg tablet TAKE ONE TABLET BY MOUTH DAILY 30 Tab 10    venlafaxine-SR (EFFEXOR-XR) 75 mg capsule TAKE ONE CAPSULE BY MOUTH DAILY 90 Cap 1    cetirizine (ZYRTEC) 10 mg tablet TAKE ONE TABLET BY MOUTH DAILY AS NEEDED 90 Tab PRN    ALPRAZolam (XANAX) 0.25 mg tablet Take 1 Tab by mouth two (2) times daily as needed for Anxiety. 30 Tab 0    CALCIUM CARBONATE/VITAMIN D3 (CALCIUM + D PO) Take  by mouth.          Review of Systems  A comprehensive review of systems was negative except for: Gastrointestinal: positive for reflux symptoms and this is about once a week  Hematologic/lymphatic: positive for easy bruising    Objective:  Visit Vitals  BP (!) 145/97   Pulse 81   Temp 98.2 °F (36.8 °C) (Oral)   Resp 18   Ht 5' 5\" (1.651 m)   Wt 160 lb (72.6 kg)   LMP 01/01/2009   BMI 26.63 kg/m²     BP Readings from Last 3 Encounters:   02/19/19 (!) 145/97   08/21/18 100/73   01/26/18 128/83       Physical Examination:   General appearance - alert, well appearing, and in no distress  Mental status - alert, oriented to person, place, and time  Eyes - pupils equal and reactive, extraocular eye movements intact, sclera anicteric  Ears - bilateral TM's and external ear canals normal  Nose - normal and patent, no erythema, discharge or polyps  Mouth - mucous membranes moist, pharynx normal without lesions and dental hygiene good  Neck - supple, no significant adenopathy, carotids upstroke normal bilaterally, no bruits, thyroid exam: thyroid is normal in size without nodules or tenderness  Lymphatics - no palpable lymphadenopathy, no hepatosplenomegaly  Chest - clear to auscultation, no wheezes, rales or rhonchi, symmetric air entry  Heart - normal rate, regular rhythm, normal S1, S2, no murmurs, rubs, clicks or gallops  Abdomen - soft, nontender, nondistended, no masses or organomegaly  bowel sounds normal  Breasts - breasts appear normal, no suspicious masses, no skin or nipple changes or axillary nodes  Pelvic - examination not indicated  Rectal - Kit for FOBT testing given to patient  Neurological - alert, oriented, normal speech, no focal findings or movement disorder noted  Musculoskeletal - normal gait  Extremities - peripheral pulses normal, no pedal edema, no clubbing or cyanosis  Skin - normal coloration and turgor, no rashes, no suspicious skin lesions noted  Left great toenail ingrown without signs of infection     Assessment/Plan:    ICD-10-CM ICD-9-CM    1. Routine general medical examination at a health care facility Z00.00 V70.0 CBC WITH AUTOMATED DIFF      OCCULT BLOOD IMMUNOASSAY,DIAGNOSTIC      CANCELED: OCCULT BLOOD IMMUNOASSAY,DIAGNOSTIC   2. Essential hypertension Z74 095.3 METABOLIC PANEL, COMPREHENSIVE   3. Ingrown toenail of left foot L60.0 703.0 REFERRAL TO PODIATRY   4. Pure hypercholesterolemia R18.63 661.3 METABOLIC PANEL, COMPREHENSIVE      LIPID PANEL   5. Osteopenia, unspecified location M85.80 733.90 DEXA BONE DENSITY STUDY AXIAL   6. Depression, major, recurrent, mild (HCC) F33.0 296.31    7. Anxiety F41.9 300.00          Breast awareness  Regular exercise  Labs per orders. Podiatry referral  Depression and anxiety continue to do well    Follow-up Disposition:  Return in about 6 weeks (around 4/2/2019) for blood pressure. Reviewed plan of care. Patient has provided input and agrees with goals.

## 2019-02-20 LAB
ALBUMIN SERPL-MCNC: 4.6 G/DL (ref 3.6–4.8)
ALBUMIN/GLOB SERPL: 1.8 {RATIO} (ref 1.2–2.2)
ALP SERPL-CCNC: 73 IU/L (ref 39–117)
ALT SERPL-CCNC: 23 IU/L (ref 0–32)
AST SERPL-CCNC: 30 IU/L (ref 0–40)
BASOPHILS # BLD AUTO: 0 X10E3/UL (ref 0–0.2)
BASOPHILS NFR BLD AUTO: 1 %
BILIRUB SERPL-MCNC: 0.4 MG/DL (ref 0–1.2)
BUN SERPL-MCNC: 13 MG/DL (ref 8–27)
BUN/CREAT SERPL: 19 (ref 12–28)
CALCIUM SERPL-MCNC: 9.2 MG/DL (ref 8.7–10.3)
CHLORIDE SERPL-SCNC: 103 MMOL/L (ref 96–106)
CHOLEST SERPL-MCNC: 280 MG/DL (ref 100–199)
CO2 SERPL-SCNC: 22 MMOL/L (ref 20–29)
CREAT SERPL-MCNC: 0.68 MG/DL (ref 0.57–1)
EOSINOPHIL # BLD AUTO: 0.1 X10E3/UL (ref 0–0.4)
EOSINOPHIL NFR BLD AUTO: 1 %
ERYTHROCYTE [DISTWIDTH] IN BLOOD BY AUTOMATED COUNT: 13.5 % (ref 12.3–15.4)
GLOBULIN SER CALC-MCNC: 2.5 G/DL (ref 1.5–4.5)
GLUCOSE SERPL-MCNC: 91 MG/DL (ref 65–99)
HCT VFR BLD AUTO: 42.2 % (ref 34–46.6)
HDLC SERPL-MCNC: 91 MG/DL
HGB BLD-MCNC: 14 G/DL (ref 11.1–15.9)
IMM GRANULOCYTES # BLD AUTO: 0 X10E3/UL (ref 0–0.1)
IMM GRANULOCYTES NFR BLD AUTO: 0 %
INTERPRETATION, 910389: NORMAL
LDLC SERPL CALC-MCNC: 178 MG/DL (ref 0–99)
LYMPHOCYTES # BLD AUTO: 1.2 X10E3/UL (ref 0.7–3.1)
LYMPHOCYTES NFR BLD AUTO: 23 %
MCH RBC QN AUTO: 32.5 PG (ref 26.6–33)
MCHC RBC AUTO-ENTMCNC: 33.2 G/DL (ref 31.5–35.7)
MCV RBC AUTO: 98 FL (ref 79–97)
MONOCYTES # BLD AUTO: 0.4 X10E3/UL (ref 0.1–0.9)
MONOCYTES NFR BLD AUTO: 7 %
NEUTROPHILS # BLD AUTO: 3.6 X10E3/UL (ref 1.4–7)
NEUTROPHILS NFR BLD AUTO: 68 %
PLATELET # BLD AUTO: 243 X10E3/UL (ref 150–379)
POTASSIUM SERPL-SCNC: 4.3 MMOL/L (ref 3.5–5.2)
PROT SERPL-MCNC: 7.1 G/DL (ref 6–8.5)
RBC # BLD AUTO: 4.31 X10E6/UL (ref 3.77–5.28)
SODIUM SERPL-SCNC: 143 MMOL/L (ref 134–144)
TRIGL SERPL-MCNC: 54 MG/DL (ref 0–149)
VLDLC SERPL CALC-MCNC: 11 MG/DL (ref 5–40)
WBC # BLD AUTO: 5.3 X10E3/UL (ref 3.4–10.8)

## 2019-02-27 ENCOUNTER — TELEPHONE (OUTPATIENT)
Dept: FAMILY MEDICINE CLINIC | Age: 64
End: 2019-02-27

## 2019-02-27 DIAGNOSIS — M85.80 OSTEOPENIA, UNSPECIFIED LOCATION: ICD-10-CM

## 2019-02-27 DIAGNOSIS — Z78.0 MENOPAUSE: Primary | ICD-10-CM

## 2019-02-27 NOTE — TELEPHONE ENCOUNTER
Pt scheduled for Dexa scan today at 2 pm but insurance is not covering it using the diagnosis of osteopenia. Can Dr. Tran Collins change code to osteoporosis based of results of results in 2015 study? If not covered, pt's test will have to be cancelled. Please advise at [phone].  Ldm

## 2019-02-28 ENCOUNTER — HOSPITAL ENCOUNTER (OUTPATIENT)
Dept: MAMMOGRAPHY | Age: 64
Discharge: HOME | End: 2019-02-28
Attending: FAMILY MEDICINE
Payer: OTHER GOVERNMENT

## 2019-02-28 DIAGNOSIS — Z78.0 MENOPAUSE: ICD-10-CM

## 2019-02-28 DIAGNOSIS — M85.80 OSTEOPENIA, UNSPECIFIED LOCATION: ICD-10-CM

## 2019-02-28 PROCEDURE — 77080 DXA BONE DENSITY AXIAL: CPT

## 2019-03-02 LAB — HEMOCCULT STL QL IA: NEGATIVE

## 2019-04-15 ENCOUNTER — OFFICE VISIT (OUTPATIENT)
Dept: FAMILY MEDICINE CLINIC | Age: 64
End: 2019-04-15

## 2019-04-15 VITALS
BODY MASS INDEX: 26.16 KG/M2 | TEMPERATURE: 98.8 F | WEIGHT: 157 LBS | RESPIRATION RATE: 18 BRPM | DIASTOLIC BLOOD PRESSURE: 83 MMHG | HEART RATE: 110 BPM | SYSTOLIC BLOOD PRESSURE: 116 MMHG | HEIGHT: 65 IN

## 2019-04-15 DIAGNOSIS — E78.00 HYPERCHOLESTEROLEMIA: ICD-10-CM

## 2019-04-15 DIAGNOSIS — I10 ESSENTIAL HYPERTENSION: Primary | ICD-10-CM

## 2019-04-15 RX ORDER — PRAVASTATIN SODIUM 40 MG/1
40 TABLET ORAL
Qty: 30 TAB | Refills: 3 | Status: SHIPPED | OUTPATIENT
Start: 2019-04-15 | End: 2020-04-04

## 2019-04-15 NOTE — PROGRESS NOTES
Chief Complaint   Patient presents with    Blood Pressure Check     1. Have you been to the ER, urgent care clinic since your last visit? Hospitalized since your last visit? No     2. Have you seen or consulted any other health care providers outside of the [address] [place] [address] since your last visit? Include any pap smears or colon screening. No     Pt declines vaccines.

## 2019-04-15 NOTE — PROGRESS NOTES
HISTORY OF PRESENT ILLNESS  Jillian Valentin is a 59 y.o. female. Jillian Valentin is here to follow up on her HTN. Her recent LDL was 179. She has been on a statin in the past, which she tolerated. Currently, she is watching her diet and just started exercising. Hypertension   The history is provided by the patient. This is a chronic problem. The current episode started more than 1 week ago. The problem occurs constantly. The problem has been gradually improving. Pertinent negatives include no chest pain, no abdominal pain, no headaches and no shortness of breath. Nothing aggravates the symptoms. Treatments tried: Norvasc. The treatment provided significant relief. Review of Systems   Constitutional: Negative for weight loss. No weight gain   Eyes: Negative for blurred vision. Respiratory: Negative for shortness of breath. Cardiovascular: Negative for chest pain and leg swelling. Gastrointestinal: Negative for abdominal pain. Neurological: Negative for dizziness, sensory change, speech change, focal weakness and headaches. Visit Vitals  /83   Pulse (!) 110   Temp 98.8 °F (37.1 °C) (Oral)   Resp 18   Ht 5' 5\" (1.651 m)   Wt 157 lb (71.2 kg)   LMP 01/01/2009   BMI 26.13 kg/m²     BP Readings from Last 3 Encounters:   02/19/19 (!) 145/97   08/21/18 100/73   01/26/18 128/83     Physical Exam   Constitutional: She is oriented to person, place, and time. She appears well-developed and well-nourished. No distress. Cardiovascular: Normal rate, regular rhythm and normal heart sounds. Exam reveals no gallop and no friction rub. No murmur heard. Pulmonary/Chest: Effort normal and breath sounds normal. No respiratory distress. She has no wheezes. She has no rales. Musculoskeletal: She exhibits no edema. Neurological: She is alert and oriented to person, place, and time. Skin: Skin is warm and dry. She is not diaphoretic. Nursing note and vitals reviewed.       ASSESSMENT and PLAN    ICD-10-CM ICD-9-CM    1. Essential hypertension H21 995.9 METABOLIC PANEL, BASIC   2. Hypercholesterolemia E78.00 272.0 HEPATIC FUNCTION PANEL      LIPID PANEL      pravastatin (PRAVACHOL) 40 mg tablet        Blood pressure controlled  Needs statin  Start pravastatin with lipid labs in 3 months  Low fat, low cholesterol diet, regular exercise weight loss    Follow-up and Dispositions    · Return in about 6 months (around 10/15/2019) for blood pressure. Reviewed plan of care. Patient has provided input and agrees with goals.

## 2019-04-26 DIAGNOSIS — I10 ESSENTIAL HYPERTENSION: ICD-10-CM

## 2019-04-28 RX ORDER — AMLODIPINE BESYLATE 5 MG/1
TABLET ORAL
Qty: 30 TAB | Refills: 11 | Status: SHIPPED | OUTPATIENT
Start: 2019-04-28 | End: 2020-05-13

## 2019-05-14 ENCOUNTER — TELEPHONE (OUTPATIENT)
Dept: FAMILY MEDICINE CLINIC | Age: 64
End: 2019-05-14

## 2019-05-14 ENCOUNTER — OFFICE VISIT (OUTPATIENT)
Dept: FAMILY MEDICINE CLINIC | Age: 64
End: 2019-05-14

## 2019-05-14 VITALS
HEART RATE: 92 BPM | WEIGHT: 157.2 LBS | DIASTOLIC BLOOD PRESSURE: 84 MMHG | TEMPERATURE: 98.6 F | BODY MASS INDEX: 26.19 KG/M2 | RESPIRATION RATE: 18 BRPM | HEIGHT: 65 IN | SYSTOLIC BLOOD PRESSURE: 136 MMHG

## 2019-05-14 DIAGNOSIS — M79.672 LEFT FOOT PAIN: ICD-10-CM

## 2019-05-14 DIAGNOSIS — I10 ESSENTIAL HYPERTENSION: ICD-10-CM

## 2019-05-14 DIAGNOSIS — T14.8XXA ABRASION OF SKIN: Primary | ICD-10-CM

## 2019-05-14 RX ORDER — MUPIROCIN 20 MG/G
OINTMENT TOPICAL
Qty: 22 G | Refills: 0 | Status: SHIPPED | OUTPATIENT
Start: 2019-05-14 | End: 2019-11-12

## 2019-05-14 RX ORDER — MUPIROCIN 20 MG/G
OINTMENT TOPICAL DAILY
Qty: 22 G | Refills: 0 | Status: SHIPPED | OUTPATIENT
Start: 2019-05-14 | End: 2019-05-14 | Stop reason: SDUPTHER

## 2019-05-14 NOTE — TELEPHONE ENCOUNTER
Please provide maximum amount per day in grams for usage and length of days for use if possible for mupirocin prescription sent today.   Tyrell

## 2019-05-14 NOTE — PATIENT INSTRUCTIONS
I recommend that the patient may apply mupirocin in lieu of Neosporin and also Vaseline as needed. Continue to let clean soapy water wash over it during showering. I would recommend not soaking the wound however in a bath  Continue to cover the wound when leaving the house  We expect that this will continue to improve, the scab or some sloughing tissue may come off and reveal underlying red or beefy tissue which would be normal as discussed  Encouraged her to follow-up with us in 10 to 14 days if her wound is not improving or sooner if it is worsening. If it continues to be stable she may stay the course  Call with questions or concerns  Blood pressure was elevated on initial vital signs however it did resolve during our encounter as documented.

## 2019-05-14 NOTE — PROGRESS NOTES
Chief Complaint   Patient presents with    Foot Injury     Top of left foot draining yellow exudate and swollen x 1 week. 1. Have you been to the ER, urgent care clinic since your last visit? Hospitalized since your last visit? No    2. Have you seen or consulted any other health care providers outside of the [address] [place] [address] since your last visit? Include any pap smears or colon screening.  No

## 2019-05-14 NOTE — PROGRESS NOTES
Family Medicine Acute Visit Progress Note  Patient: Jackson Fay  1955, 59 y.o., female  Encounter Date: 5/14/2019    ASSESSMENT & PLAN    ICD-10-CM ICD-9-CM    1. Abrasion of skin T14. 8XXA 919.0    2. Left foot pain M79.672 729.5    3. Essential hypertension I10 401.9        Orders Placed This Encounter    mupirocin (BACTROBAN) 2 % ointment     Sig: Apply  to affected area daily. Dispense:  22 g     Refill:  0       Patient Instructions   I recommend that the patient may apply mupirocin in lieu of Neosporin and also Vaseline as needed. Continue to let clean soapy water wash over it during showering. I would recommend not soaking the wound however in a bath  Continue to cover the wound when leaving the house  We expect that this will continue to improve, the scab or some sloughing tissue may come off and reveal underlying red or beefy tissue which would be normal as discussed  Encouraged her to follow-up with us in 10 to 14 days if her wound is not improving or sooner if it is worsening. If it continues to be stable she may stay the course  Call with questions or concerns  Blood pressure was elevated on initial vital signs however it did resolve during our encounter as documented. CHIEF COMPLAINT  Chief Complaint   Patient presents with    Foot Injury     Top of left foot draining yellow exudate and swollen x 1 week. Andra Muir is a 59 y.o. female presenting today for acute care. About 1 week ago she dropped a very heavy cardboard box on the dorsum of her left foot. She has since had an abrasion that has been very slowly healing. No fevers or chills, no spreading redness or streaking. She has some yellow to clear drainage, and an area where the skin was abraded. She does also have some swelling which she reports is improving. She has been elevating her foot and icing.   She has been applying Neosporin and a bandage when she is out, she has been using soapy water to clean and is leaving uncovered at home  No other concerns today  Blood pressure slightly elevated on arrival but resolves to normal range on recheck    Review of Systems  A 12 point review of systems was negative except as noted here or in the HPI. OBJECTIVE  Visit Vitals  /84   Pulse 92   Temp 98.6 °F (37 °C) (Oral)   Resp 18   Ht 5' 5\" (1.651 m)   Wt 157 lb 3.2 oz (71.3 kg)   LMP 01/01/2009   BMI 26.16 kg/m²       Physical Exam   Constitutional: She is oriented to person, place, and time. She appears well-developed and well-nourished. No distress. HENT:   Head: Normocephalic and atraumatic. Mouth/Throat: Oropharynx is clear and moist.   Eyes: EOM are normal. Right eye exhibits no discharge. Left eye exhibits no discharge. No scleral icterus. Cardiovascular: Normal rate and regular rhythm. Pulmonary/Chest: Effort normal. No respiratory distress. Abdominal: Soft. She exhibits no distension. Musculoskeletal:        Left foot: There is swelling. There is normal range of motion, no tenderness, no bony tenderness and normal capillary refill. Feet:    4 cm x 2 cm area of skin abrasion that has healthy granulating tissue and also some yellowish sloughing tissue noted, no evidence of exudate and drainage appears to be serous. There is no streaking or signs of acute infection, no bleeding  1+ edema unilaterally left foot   Neurological: She is alert and oriented to person, place, and time. Skin: Skin is warm. She is not diaphoretic. Psychiatric: She has a normal mood and affect. Her behavior is normal.   Nursing note and vitals reviewed. No results found for any visits on 05/14/19. HISTORICAL  PMH, PSH, FHX, SOCHX, ALLERGIES and MES were reviewed and updated today. Toney Nj MD  [facility]  05/14/19 11:42 AM    Portions of this note may have been populated using smart dictation software and may have \"sounds-like\" errors present.      Pt was counseled on risks, benefits and alternatives of treatment options. All questions were asked and answered and the patient was agreeable with the treatment plan as outlined.

## 2019-06-02 ENCOUNTER — HOSPITAL ENCOUNTER (EMERGENCY)
Age: 64
Discharge: HOME | End: 2019-06-02
Attending: EMERGENCY MEDICINE
Payer: SELF-PAY

## 2019-06-02 ENCOUNTER — APPOINTMENT (OUTPATIENT)
Dept: GENERAL RADIOLOGY | Age: 64
End: 2019-06-02
Attending: NURSE PRACTITIONER
Payer: SELF-PAY

## 2019-06-02 VITALS
DIASTOLIC BLOOD PRESSURE: 101 MMHG | HEART RATE: 94 BPM | RESPIRATION RATE: 16 BRPM | BODY MASS INDEX: 24.99 KG/M2 | HEIGHT: 65 IN | SYSTOLIC BLOOD PRESSURE: 148 MMHG | WEIGHT: 150 LBS | TEMPERATURE: 98.9 F | OXYGEN SATURATION: 97 %
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
## 2019-06-02 DIAGNOSIS — T14.8XXA ABRASION: ICD-10-CM

## 2019-06-02 DIAGNOSIS — R07.89 CHEST WALL PAIN: Primary | ICD-10-CM

## 2019-06-02 PROCEDURE — 71046 X-RAY EXAM CHEST 2 VIEWS: CPT

## 2019-06-02 PROCEDURE — 99281 EMR DPT VST MAYX REQ PHY/QHP: CPT
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
## 2019-06-02 NOTE — ED NOTES
Emergency Department Nursing Plan of Care       The Nursing Plan of Care is developed from the Nursing assessment and Emergency Department Attending provider initial evaluation. The plan of care may be reviewed in the ED Provider note.     The Plan of Care was developed with the following considerations:   Patient / Family readiness to learn indicated by:verbalized understanding  Persons(s) to be included in education: patient  Barriers to Learning/Limitations:No    Signed     Janine Pacheco RN    6/2/2019   12:45 PM
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
## 2019-07-15 DIAGNOSIS — I10 ESSENTIAL HYPERTENSION: ICD-10-CM

## 2019-07-15 DIAGNOSIS — E78.00 HYPERCHOLESTEROLEMIA: ICD-10-CM

## 2019-08-05 DIAGNOSIS — F33.0 DEPRESSION, MAJOR, RECURRENT, MILD (HCC): ICD-10-CM

## 2019-08-05 DIAGNOSIS — F41.9 ANXIETY: ICD-10-CM

## 2019-08-07 RX ORDER — VENLAFAXINE HYDROCHLORIDE 75 MG/1
CAPSULE, EXTENDED RELEASE ORAL
Qty: 90 CAP | Refills: 1 | Status: SHIPPED | OUTPATIENT
Start: 2019-08-07 | End: 2019-11-12

## 2019-11-12 ENCOUNTER — OFFICE VISIT (OUTPATIENT)
Dept: FAMILY MEDICINE CLINIC | Age: 64
End: 2019-11-12

## 2019-11-12 VITALS
TEMPERATURE: 98.8 F | SYSTOLIC BLOOD PRESSURE: 116 MMHG | HEIGHT: 65 IN | RESPIRATION RATE: 18 BRPM | HEART RATE: 84 BPM | DIASTOLIC BLOOD PRESSURE: 79 MMHG | BODY MASS INDEX: 26.33 KG/M2 | WEIGHT: 158 LBS

## 2019-11-12 DIAGNOSIS — F33.0 DEPRESSION, MAJOR, RECURRENT, MILD (HCC): ICD-10-CM

## 2019-11-12 DIAGNOSIS — F41.9 ANXIETY: ICD-10-CM

## 2019-11-12 DIAGNOSIS — I10 ESSENTIAL HYPERTENSION: Primary | ICD-10-CM

## 2019-11-12 DIAGNOSIS — E78.00 HYPERCHOLESTEROLEMIA: ICD-10-CM

## 2019-11-12 RX ORDER — VENLAFAXINE HYDROCHLORIDE 150 MG/1
150 CAPSULE, EXTENDED RELEASE ORAL DAILY
Qty: 90 CAP | Refills: 0 | Status: SHIPPED | OUTPATIENT
Start: 2019-11-12 | End: 2019-12-05
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
## 2019-11-12 NOTE — PROGRESS NOTES
HISTORY OF PRESENT ILLNESS  Queenie Hastings is a 59 y.o. female. Queenie Hastings is here to follow up on their HTN. This is a chronic problem. The problem occurs constantly and is improved. The symptoms are relieved by Norvasc, which is/are working well.     She is here to follow up on their depression and anxiety. He/she is unchanged. Currently, they are taking Effexor which is/are working well      Review of Systems   Constitutional: Negative for malaise/fatigue and weight loss. No weight gain   Eyes: Negative for blurred vision. Respiratory: Negative for shortness of breath. Cardiovascular: Negative for chest pain, palpitations and leg swelling. Gastrointestinal: Negative for abdominal pain. Neurological: Negative for dizziness, sensory change, speech change, focal weakness and headaches. Psychiatric/Behavioral: Negative for depression, hallucinations, substance abuse and suicidal ideas. The patient is nervous/anxious and has insomnia. Visit Vitals  /79   Pulse 84   Temp 98.8 °F (37.1 °C) (Oral)   Resp 18   Ht 5' 5\" (1.651 m)   Wt 158 lb (71.7 kg)   LMP 01/01/2009   BMI 26.29 kg/m²     BP Readings from Last 3 Encounters:   11/12/19 116/79   06/02/19 (!) 148/101   05/14/19 136/84     Physical Exam   Constitutional: She is oriented to person, place, and time. She appears well-developed and well-nourished. No distress. Cardiovascular: Normal rate, regular rhythm and normal heart sounds. Exam reveals no gallop and no friction rub. No murmur heard. Pulmonary/Chest: Effort normal and breath sounds normal. No respiratory distress. She has no wheezes. She has no rales. Musculoskeletal: She exhibits no edema. Neurological: She is alert and oriented to person, place, and time. She displays no tremor. Skin: Skin is warm and dry. She is not diaphoretic. Psychiatric: She has a normal mood and affect.  Her behavior is normal. Judgment and thought content normal.   Nursing note and vitals reviewed. ASSESSMENT and PLAN    ICD-10-CM ICD-9-CM    1. Essential hypertension N68 853.3 METABOLIC PANEL, BASIC   2. Depression, major, recurrent, mild (HCC) F33.0 296.31 venlafaxine-SR (EFFEXOR-XR) 150 mg capsule   3. Anxiety F41.9 300.00 venlafaxine-SR (EFFEXOR-XR) 150 mg capsule   4. Hypercholesterolemia E78.00 272.0 LIPID PANEL      HEPATIC FUNCTION PANEL        Blood pressure controlled  Depression seems stable, anxiety a little worse  Labs per orders. Increase Effexor    Follow-up and Dispositions    · Return in about 3 weeks (around 12/3/2019) for depression, anxiety. Reviewed plan of care. Patient has provided input and agrees with goals.

## 2019-12-05 ENCOUNTER — OFFICE VISIT (OUTPATIENT)
Dept: FAMILY MEDICINE CLINIC | Age: 64
End: 2019-12-05

## 2019-12-05 ENCOUNTER — HOSPITAL ENCOUNTER (OUTPATIENT)
Dept: LAB | Age: 64
Discharge: HOME | End: 2019-12-05

## 2019-12-05 VITALS
TEMPERATURE: 98.1 F | BODY MASS INDEX: 25.49 KG/M2 | HEART RATE: 77 BPM | RESPIRATION RATE: 18 BRPM | HEIGHT: 65 IN | WEIGHT: 153 LBS | SYSTOLIC BLOOD PRESSURE: 120 MMHG | DIASTOLIC BLOOD PRESSURE: 97 MMHG

## 2019-12-05 DIAGNOSIS — I10 ESSENTIAL HYPERTENSION: ICD-10-CM

## 2019-12-05 DIAGNOSIS — E78.00 HYPERCHOLESTEROLEMIA: ICD-10-CM

## 2019-12-05 DIAGNOSIS — F41.9 ANXIETY: ICD-10-CM

## 2019-12-05 DIAGNOSIS — F33.0 DEPRESSION, MAJOR, RECURRENT, MILD (HCC): Primary | ICD-10-CM

## 2019-12-05 LAB
ALBUMIN SERPL-MCNC: 4.1 G/DL (ref 3.5–5)
ALBUMIN/GLOB SERPL: 1.1 {RATIO} (ref 1.1–2.2)
ALP SERPL-CCNC: 78 U/L (ref 45–117)
ALT SERPL-CCNC: 24 U/L (ref 12–78)
ANION GAP SERPL CALC-SCNC: 6 MMOL/L (ref 5–15)
AST SERPL-CCNC: 22 U/L (ref 15–37)
BILIRUB DIRECT SERPL-MCNC: 0.2 MG/DL (ref 0–0.2)
BILIRUB SERPL-MCNC: 0.7 MG/DL (ref 0.2–1)
BUN SERPL-MCNC: 10 MG/DL (ref 6–20)
BUN/CREAT SERPL: 15 (ref 12–20)
CALCIUM SERPL-MCNC: 9.5 MG/DL (ref 8.5–10.1)
CHLORIDE SERPL-SCNC: 103 MMOL/L (ref 97–108)
CHOLEST SERPL-MCNC: 311 MG/DL
CO2 SERPL-SCNC: 29 MMOL/L (ref 21–32)
COMMENT, HOLDF: NORMAL
CREAT SERPL-MCNC: 0.68 MG/DL (ref 0.55–1.02)
GLOBULIN SER CALC-MCNC: 3.7 G/DL (ref 2–4)
GLUCOSE SERPL-MCNC: 96 MG/DL (ref 65–100)
HDLC SERPL-MCNC: 86 MG/DL
HDLC SERPL: 3.6 {RATIO} (ref 0–5)
LDLC SERPL CALC-MCNC: 195 MG/DL (ref 0–100)
LIPID PROFILE,FLP: ABNORMAL
POTASSIUM SERPL-SCNC: 4 MMOL/L (ref 3.5–5.1)
PROT SERPL-MCNC: 7.8 G/DL (ref 6.4–8.2)
SAMPLES BEING HELD,HOLD: NORMAL
SODIUM SERPL-SCNC: 138 MMOL/L (ref 136–145)
TRIGL SERPL-MCNC: 150 MG/DL (ref ?–150)
VLDLC SERPL CALC-MCNC: 30 MG/DL

## 2019-12-05 RX ORDER — SERTRALINE HYDROCHLORIDE 50 MG/1
50 TABLET, FILM COATED ORAL DAILY
Qty: 30 TAB | Refills: 0 | Status: SHIPPED | OUTPATIENT
Start: 2019-12-05 | End: 2020-01-17

## 2019-12-05 NOTE — PROGRESS NOTES
Chief Complaint   Patient presents with    Hypertension     depression 3 wk f/u     1. Have you been to the ER, urgent care clinic since your last visit? Hospitalized since your last visit? No     2. Have you seen or consulted any other health care providers outside of the [address] [place] [address] since your last visit? Include any pap smears or colon screening.  No

## 2019-12-05 NOTE — PROGRESS NOTES
HISTORY OF PRESENT ILLNESS  Key Marcum is a 62 y.o. female. Key Marcum is here to follow up on their anxiety and depression. He/she is better, but she feels like she is getting a little emotional again. At her last visit, her Effexor was increased, however, she tried it for a week, but stopped her because it made her so jittery. Currently, they are taking prn Xanax, which she takes several times ayear, which is/are working well. Review of Systems   Constitutional: Positive for weight loss. Negative for malaise/fatigue. No weight gain   Respiratory: Negative for shortness of breath. Cardiovascular: Negative for chest pain and palpitations. Psychiatric/Behavioral: Negative for depression, hallucinations, substance abuse and suicidal ideas. The patient has insomnia. The patient is not nervous/anxious. Visit Vitals  BP (!) 120/97   Pulse 77   Temp 98.1 °F (36.7 °C) (Oral)   Resp 18   Ht 5' 5\" (1.651 m)   Wt 153 lb (69.4 kg)   LMP 01/01/2009   BMI 25.46 kg/m²     BP Readings from Last 3 Encounters:   12/05/19 (!) 120/97   11/12/19 116/79   06/02/19 (!) 148/101     Physical Exam  Constitutional:       General: She is not in acute distress. Appearance: Normal appearance. Neurological:      Mental Status: She is alert and oriented to person, place, and time. Motor: No tremor. Psychiatric:         Mood and Affect: Mood normal.         Behavior: Behavior normal.         Thought Content: Thought content normal.         Judgment: Judgment normal.         ASSESSMENT and PLAN    ICD-10-CM ICD-9-CM    1. Depression, major, recurrent, mild (HCC) F33.0 296.31 sertraline (ZOLOFT) 50 mg tablet   2. Anxiety F41.9 300.00 sertraline (ZOLOFT) 50 mg tablet        Depression, in particular, needs improvement, did not tolerate Effexor  Start Zoloft    Follow-up and Dispositions    · Return in about 6 weeks (around 1/16/2020) for depression, anxiety. Reviewed plan of care. Patient has provided input and agrees with goals.

## 2020-01-15 NOTE — PROGRESS NOTES
HISTORY OF PRESENT ILLNESS  Kusum Wang is a 62 y.o. female. Patient presents with:  Medication Evaluation: 6 wk f/u; sertraline    Kusum Wang is here to follow up on their depression and anxiety. He/she is better. Currently, they are taking Zoloft which is/are working well. Nothing makes her better or worse. Review of Systems   Constitutional: Positive for malaise/fatigue. Negative for weight loss. No weight gain   Respiratory: Negative for shortness of breath. Cardiovascular: Negative for chest pain and palpitations. Psychiatric/Behavioral: Negative for depression, hallucinations, substance abuse and suicidal ideas. The patient has insomnia. The patient is not nervous/anxious. Visit Vitals  /76   Pulse 82   Temp 97.9 °F (36.6 °C) (Oral)   Resp 18   Ht 5' 5\" (1.651 m)   Wt 155 lb (70.3 kg)   LMP 01/01/2009   BMI 25.79 kg/m²     Physical Exam  Constitutional:       General: She is not in acute distress. Appearance: Normal appearance. Neurological:      Mental Status: She is alert and oriented to person, place, and time. Motor: No tremor. Psychiatric:         Mood and Affect: Mood normal.         Behavior: Behavior normal.         Thought Content: Thought content normal.         Judgment: Judgment normal.         ASSESSMENT and PLAN    ICD-10-CM ICD-9-CM    1. Depression, major, recurrent, mild (HCC) F33.0 296.31 sertraline (ZOLOFT) 100 mg tablet   2. Anxiety F41.9 300.00 sertraline (ZOLOFT) 100 mg tablet        Depression could use a little more improvement  Increase Zoloft    Follow-up and Dispositions    · Return in about 3 weeks (around 2/7/2020) for depression, anxiety, knee pain. Reviewed plan of care. Patient has provided input and agrees with goals.

## 2020-01-17 ENCOUNTER — OFFICE VISIT (OUTPATIENT)
Dept: FAMILY MEDICINE CLINIC | Age: 65
End: 2020-01-17

## 2020-01-17 VITALS
BODY MASS INDEX: 25.83 KG/M2 | DIASTOLIC BLOOD PRESSURE: 76 MMHG | SYSTOLIC BLOOD PRESSURE: 105 MMHG | HEART RATE: 82 BPM | WEIGHT: 155 LBS | RESPIRATION RATE: 18 BRPM | HEIGHT: 65 IN | TEMPERATURE: 97.9 F

## 2020-01-17 DIAGNOSIS — F41.9 ANXIETY: ICD-10-CM

## 2020-01-17 DIAGNOSIS — F33.0 DEPRESSION, MAJOR, RECURRENT, MILD (HCC): Primary | ICD-10-CM

## 2020-01-17 RX ORDER — SERTRALINE HYDROCHLORIDE 100 MG/1
100 TABLET, FILM COATED ORAL DAILY
Qty: 30 TAB | Refills: 0 | Status: SHIPPED | OUTPATIENT
Start: 2020-01-17 | End: 2020-04-04

## 2020-01-17 NOTE — PROGRESS NOTES
Chief Complaint   Patient presents with    Medication Evaluation     6 wk f/u; sertraline     1. Have you been to the ER, urgent care clinic since your last visit? Hospitalized since your last visit? No     2. Have you seen or consulted any other health care providers outside of the [address] [place] [address] since your last visit? Include any pap smears or colon screening.  No

## 2020-02-07 ENCOUNTER — HOSPITAL ENCOUNTER (OUTPATIENT)
Dept: GENERAL RADIOLOGY | Age: 65
Discharge: HOME | End: 2020-02-07
Payer: OTHER GOVERNMENT

## 2020-02-07 ENCOUNTER — OFFICE VISIT (OUTPATIENT)
Dept: FAMILY MEDICINE CLINIC | Age: 65
End: 2020-02-07

## 2020-02-07 VITALS
BODY MASS INDEX: 25.99 KG/M2 | DIASTOLIC BLOOD PRESSURE: 73 MMHG | TEMPERATURE: 97.7 F | WEIGHT: 156 LBS | HEART RATE: 82 BPM | HEIGHT: 65 IN | SYSTOLIC BLOOD PRESSURE: 96 MMHG

## 2020-02-07 DIAGNOSIS — M25.562 ACUTE PAIN OF LEFT KNEE: Primary | ICD-10-CM

## 2020-02-07 DIAGNOSIS — Z12.31 ENCOUNTER FOR SCREENING MAMMOGRAM FOR MALIGNANT NEOPLASM OF BREAST: ICD-10-CM

## 2020-02-07 DIAGNOSIS — M25.562 ACUTE PAIN OF LEFT KNEE: ICD-10-CM

## 2020-02-07 DIAGNOSIS — Z12.11 COLON CANCER SCREENING: ICD-10-CM

## 2020-02-07 PROCEDURE — 73562 X-RAY EXAM OF KNEE 3: CPT

## 2020-02-07 RX ORDER — NAPROXEN 500 MG/1
500 TABLET ORAL 2 TIMES DAILY WITH MEALS
Qty: 40 TAB | Refills: 1 | Status: SHIPPED | OUTPATIENT
Start: 2020-02-07 | End: 2021-06-07

## 2020-02-07 NOTE — PROGRESS NOTES
HISTORY OF PRESENT ILLNESS  Karo Rhodes is a 62 y.o. female. Patient presents with:  Knee Pain: left; x 2 wks     It has gotten a little better, however, she wakes up with leg stiffness and pain. It is worse at the end of the day. Rest makes it better. She has tried Aleve, which works during the day, but not at night. No history of trauma or overuse. It locks up from time to time, no giving way. Review of Systems   Constitutional: Negative for chills and fever. Musculoskeletal: Positive for joint pain. Knee swelling   Skin:        Knee warmth, no redness       Visit Vitals  BP 96/73   Pulse 82   Temp 97.7 °F (36.5 °C) (Oral)   Ht 5' 5\" (1.651 m)   Wt 156 lb (70.8 kg)   LMP 01/01/2009   BMI 25.96 kg/m²     BP Readings from Last 3 Encounters:   02/07/20 96/73   01/17/20 105/76   12/05/19 (!) 120/97     Physical Exam  Constitutional:       General: She is not in acute distress. Appearance: Normal appearance. Musculoskeletal:      Left knee: She exhibits decreased range of motion, swelling and bony tenderness. She exhibits no effusion, no erythema, no LCL laxity, normal meniscus and no MCL laxity. Tenderness found. Medial joint line tenderness noted. Neurological:      Mental Status: She is alert and oriented to person, place, and time. ASSESSMENT and PLAN    ICD-10-CM ICD-9-CM    1. Acute pain of left knee M25.562 719.46 XR KNEE LT MIN 4 V      naproxen (NAPROSYN) 500 mg tablet   2. Colon cancer screening Z12.11 V76.51 REFERRAL TO GASTROENTEROLOGY   3. Encounter for screening mammogram for malignant neoplasm of breast Z12.31 V76.12 [facility] MAMMO BI SCREENING INCL CAD        Likely osteoarthritis  Knee x-ray  Naprosyn prn  Heat and/or ice prn  Regular low impact exercise  Colonoscopy  Mammogram   Consider PT if not better in several weeks    Follow-up and Dispositions    · Return if symptoms worsen or fail to improve. Reviewed plan of care.   Patient has provided input and agrees with goals.      '

## 2020-02-07 NOTE — PROGRESS NOTES
Chief Complaint   Patient presents with    Knee Pain     left; x 2 wks      1. Have you been to the ER, urgent care clinic since your last visit? Hospitalized since your last visit? No     2. Have you seen or consulted any other health care providers outside of the [address] [place] [address] since your last visit? Include any pap smears or colon screening. No     Pt declines shingles vaccine.

## 2020-05-11 DIAGNOSIS — I10 ESSENTIAL HYPERTENSION: ICD-10-CM

## 2020-05-13 RX ORDER — AMLODIPINE BESYLATE 5 MG/1
TABLET ORAL
Qty: 90 TAB | Refills: 1 | Status: SHIPPED | OUTPATIENT
Start: 2020-05-13 | End: 2020-11-06

## 2020-08-13 NOTE — TELEPHONE ENCOUNTER
Left message on patients voice mail to return phone call regarding test results  Orders not placed until patient lets us know if he wants them done  Pt needs a referral to transfer to Dr Eda Sloan, Psychiatrist. She is not happy with her current MD     Dr Tung Saab address:  [id number] [facility]

## 2020-11-04 ENCOUNTER — OFFICE VISIT (OUTPATIENT)
Dept: URGENT CARE | Age: 65
End: 2020-11-04
Payer: OTHER GOVERNMENT

## 2020-11-04 ENCOUNTER — NURSE TRIAGE (OUTPATIENT)
Dept: OTHER | Facility: CLINIC | Age: 65
End: 2020-11-04

## 2020-11-04 VITALS — OXYGEN SATURATION: 96 % | TEMPERATURE: 98.6 F | HEART RATE: 79 BPM | RESPIRATION RATE: 16 BRPM

## 2020-11-04 DIAGNOSIS — Z20.822 EXPOSURE TO COVID-19 VIRUS: Primary | ICD-10-CM

## 2020-11-04 PROCEDURE — 99202 OFFICE O/P NEW SF 15 MIN: CPT | Performed by: FAMILY MEDICINE

## 2020-11-04 NOTE — PROGRESS NOTES
This patient was seen in [facility] at [address] [place] [address] [facility] while in their vehicle due to COVID-19 pandemic with PPE and focused examination in order to decrease community viral transmission. The patient/guardian gave verbal consent to treat. Cough   The history is provided by the patient. This is a new problem. The current episode started 2 days ago. The problem occurs every few minutes. The problem has not changed since onset. The cough is non-productive. There has been no fever. Pertinent negatives include no chills, no sweats, no rhinorrhea, no sore throat, no shortness of breath, no wheezing, no nausea and no vomiting. Risk factors: potential exposure to covid but no direct contact. Her past medical history does not include pneumonia or asthma.         Past Medical History:   Diagnosis Date    Allergic conjunctivitis of both eyes 10/19/2015    Depression 9/11/2014    Essential hypertension 12/15/2017    Hot flashes 6/23/2014    Hot flashes     Hypercholesterolemia     Osteoarthritis of right knee 3/11/2014    Osteoarthritis of right knee 3/11/2014        Past Surgical History:   Procedure Laterality Date    ENDOSCOPY, COLON, DIAGNOSTIC      2 /10 normal         Family History   Problem Relation Age of Onset    Elevated Lipids Mother     Elevated Lipids Father     Hypertension Father     Elevated Lipids Sister         Social History     Socioeconomic History    Marital status:      Spouse name: Not on file    Number of children: Not on file    Years of education: Not on file    Highest education level: Not on file   Occupational History    Not on file   Social Needs    Financial resource strain: Not on file    Food insecurity     Worry: Not on file     Inability: Not on file    Transportation needs     Medical: Not on file     Non-medical: Not on file   Tobacco Use    Smoking status: Former Smoker     Types: Cigarettes    Smokeless tobacco: Never Used    Tobacco comment: for only a couple of years in her late teens   Substance and Sexual Activity    Alcohol use: Yes     Comment: 2 glasses of wine, 2-3 x a week    Drug use: No    Sexual activity: Yes     Partners: Female   Lifestyle    Physical activity     Days per week: Not on file     Minutes per session: Not on file    Stress: Not on file   Relationships    Social connections     Talks on phone: Not on file     Gets together: Not on file     Attends Congregation service: Not on file     Active member of club or organization: Not on file     Attends meetings of clubs or organizations: Not on file     Relationship status: Not on file    Intimate partner violence     Fear of current or ex partner: Not on file     Emotionally abused: Not on file     Physically abused: Not on file     Forced sexual activity: Not on file   Other Topics Concern    Not on file   Social History Narrative    Not on file                ALLERGIES: Effexor [venlafaxine]    Review of Systems   Constitutional: Negative for chills. HENT: Negative for rhinorrhea and sore throat. Respiratory: Positive for cough. Negative for shortness of breath and wheezing. Gastrointestinal: Negative for nausea and vomiting. All other systems reviewed and are negative. Vitals:    11/04/20 1001   Pulse: 79   Resp: 16   Temp: 98.6 °F (37 °C)   SpO2: 96%       Physical Exam  Vitals signs and nursing note reviewed. Constitutional:       General: She is not in acute distress. Appearance: She is not ill-appearing. Pulmonary:      Effort: Pulmonary effort is normal. No respiratory distress. Breath sounds: No wheezing. MDM    Procedures        ICD-10-CM ICD-9-CM    1. Exposure to COVID-19 virus  Z20.828 V01.79 NOVEL CORONAVIRUS (COVID-19)      Tested for Covid-19 and advised to quarantine until result comes back. No orders of the defined types were placed in this encounter. No results found for any visits on 11/04/20.   The patients condition was discussed with the patient and they understand. The patient is to follow up with primary care doctor. If signs and symptoms become worse the pt is to go to the ER. The patient is to take medications as prescribed.

## 2020-11-05 DIAGNOSIS — I10 ESSENTIAL HYPERTENSION: ICD-10-CM

## 2020-11-06 LAB — SARS-COV-2, NAA: NOT DETECTED

## 2020-11-06 RX ORDER — AMLODIPINE BESYLATE 5 MG/1
TABLET ORAL
Qty: 30 TAB | Refills: 0 | Status: SHIPPED | OUTPATIENT
Start: 2020-11-06 | End: 2020-12-08

## 2020-11-09 NOTE — TELEPHONE ENCOUNTER
Called pt, and left a voice message, that he/she is due for their follow up appointment, here at [facility]. Advised pt to call the office back to schedule their appointment.

## 2020-12-05 DIAGNOSIS — I10 ESSENTIAL HYPERTENSION: ICD-10-CM

## 2020-12-07 ENCOUNTER — OFFICE VISIT (OUTPATIENT)
Dept: FAMILY MEDICINE CLINIC | Age: 65
End: 2020-12-07
Payer: OTHER GOVERNMENT

## 2020-12-07 VITALS
SYSTOLIC BLOOD PRESSURE: 109 MMHG | WEIGHT: 162 LBS | RESPIRATION RATE: 20 BRPM | BODY MASS INDEX: 26.99 KG/M2 | DIASTOLIC BLOOD PRESSURE: 72 MMHG | HEART RATE: 72 BPM | TEMPERATURE: 98 F | HEIGHT: 65 IN

## 2020-12-07 DIAGNOSIS — L98.9 SKIN LESIONS: ICD-10-CM

## 2020-12-07 DIAGNOSIS — G47.00 INSOMNIA, UNSPECIFIED TYPE: ICD-10-CM

## 2020-12-07 DIAGNOSIS — I10 ESSENTIAL HYPERTENSION: Primary | ICD-10-CM

## 2020-12-07 DIAGNOSIS — F33.0 DEPRESSION, MAJOR, RECURRENT, MILD (HCC): ICD-10-CM

## 2020-12-07 DIAGNOSIS — E78.00 HYPERCHOLESTEROLEMIA: ICD-10-CM

## 2020-12-07 PROCEDURE — 99214 OFFICE O/P EST MOD 30 MIN: CPT | Performed by: FAMILY MEDICINE

## 2020-12-07 RX ORDER — TRAZODONE HYDROCHLORIDE 50 MG/1
50 TABLET ORAL
Qty: 30 TAB | Refills: 0 | Status: SHIPPED | OUTPATIENT
Start: 2020-12-07 | End: 2021-01-07

## 2020-12-07 NOTE — PROGRESS NOTES
HISTORY OF PRESENT ILLNESS  Jean-Paul Fischer is a 60 y.o. female. Jean-Paul Fischer is here to follow up on their HTN. This is a chronic problem. The problem occurs constantly and is stable. The symptoms are relieved by Norvasc, which is/are working well. She also needs follow up on her depression and elevated lipids. Her depression is doing well. Also, she needs a dermatology referral for recurrent skin lesions on her arms. Review of Systems   Constitutional: Negative for malaise/fatigue and weight loss. Weight gain   Eyes: Negative for blurred vision. Respiratory: Negative for shortness of breath. Cardiovascular: Negative for chest pain, palpitations and leg swelling. Gastrointestinal: Negative for abdominal pain. Neurological: Negative for dizziness, sensory change, speech change, focal weakness and headaches. Psychiatric/Behavioral: Negative for depression, hallucinations, substance abuse and suicidal ideas. The patient has insomnia. The patient is not nervous/anxious. Visit Vitals  /72   Pulse 72   Temp 98 °F (36.7 °C) (Temporal)   Resp 20   Ht 5' 5\" (1.651 m)   Wt 162 lb (73.5 kg)   LMP 01/01/2009   BMI 26.96 kg/m²     BP Readings from Last 3 Encounters:   02/07/20 96/73   01/17/20 105/76   12/05/19 (!) 120/97       Physical Exam  Vitals signs and nursing note reviewed. Constitutional:       General: She is not in acute distress. Appearance: Normal appearance. She is well-developed. She is not diaphoretic. Cardiovascular:      Rate and Rhythm: Normal rate and regular rhythm. Heart sounds: Normal heart sounds. No murmur. No friction rub. No gallop. Pulmonary:      Effort: Pulmonary effort is normal. No respiratory distress. Breath sounds: Normal breath sounds. No wheezing or rales. Skin:     General: Skin is warm and dry. Neurological:      Mental Status: She is alert and oriented to person, place, and time. Motor: No tremor. Psychiatric:         Mood and Affect: Mood normal.         Behavior: Behavior normal.         Thought Content: Thought content normal.         Judgment: Judgment normal.         ASSESSMENT and PLAN    ICD-10-CM ICD-9-CM    1. Essential hypertension  G03 983.8 METABOLIC PANEL, BASIC   2. Depression, major, recurrent, mild (HCC)  F33.0 296.31    3. Hypercholesterolemia  E78.00 272.0 LIPID PANEL      HEPATIC FUNCTION PANEL   4. Insomnia, unspecified type  G47.00 780.52 traZODone (DESYREL) 50 mg tablet   5. Skin lesions  L98.9 709.9 REFERRAL TO DERMATOLOGY        Blood pressure controlled  Depression doing well  Labs per orders. Trazodone  Dermatology referral    Follow-up and Dispositions    · Return in about 6 months (around 6/7/2021) for blood pressure. Reviewed plan of care. Patient has provided input and agrees with goals.

## 2020-12-08 RX ORDER — AMLODIPINE BESYLATE 5 MG/1
TABLET ORAL
Qty: 90 TAB | Refills: 4 | Status: SHIPPED | OUTPATIENT
Start: 2020-12-08 | End: 2021-06-07 | Stop reason: SDUPTHER

## 2020-12-11 DIAGNOSIS — I10 ESSENTIAL HYPERTENSION: ICD-10-CM

## 2020-12-11 DIAGNOSIS — E78.00 HYPERCHOLESTEROLEMIA: ICD-10-CM

## 2020-12-11 LAB
ALBUMIN SERPL-MCNC: 4.3 G/DL (ref 3.5–5)
ALBUMIN/GLOB SERPL: 1.1 {RATIO} (ref 1.1–2.2)
ALP SERPL-CCNC: 84 U/L (ref 45–117)
ALT SERPL-CCNC: 28 U/L (ref 12–78)
ANION GAP SERPL CALC-SCNC: 4 MMOL/L (ref 5–15)
AST SERPL-CCNC: 25 U/L (ref 15–37)
BILIRUB DIRECT SERPL-MCNC: 0.3 MG/DL (ref 0–0.2)
BILIRUB SERPL-MCNC: 0.9 MG/DL (ref 0.2–1)
BUN SERPL-MCNC: 9 MG/DL (ref 6–20)
BUN/CREAT SERPL: 13 (ref 12–20)
CALCIUM SERPL-MCNC: 9.7 MG/DL (ref 8.5–10.1)
CHLORIDE SERPL-SCNC: 104 MMOL/L (ref 97–108)
CHOLEST SERPL-MCNC: 235 MG/DL
CO2 SERPL-SCNC: 29 MMOL/L (ref 21–32)
CREAT SERPL-MCNC: 0.68 MG/DL (ref 0.55–1.02)
GLOBULIN SER CALC-MCNC: 3.9 G/DL (ref 2–4)
GLUCOSE SERPL-MCNC: 94 MG/DL (ref 65–100)
HDLC SERPL-MCNC: 108 MG/DL
HDLC SERPL: 2.2 {RATIO} (ref 0–5)
LDLC SERPL CALC-MCNC: 102.4 MG/DL (ref 0–100)
LIPID PROFILE,FLP: ABNORMAL
POTASSIUM SERPL-SCNC: 4.6 MMOL/L (ref 3.5–5.1)
PROT SERPL-MCNC: 8.2 G/DL (ref 6.4–8.2)
SODIUM SERPL-SCNC: 137 MMOL/L (ref 136–145)
TRIGL SERPL-MCNC: 123 MG/DL (ref ?–150)
VLDLC SERPL CALC-MCNC: 24.6 MG/DL

## 2020-12-11 NOTE — PROGRESS NOTES
HISTORY OF PRESENT ILLNESS  Juan José Dillon is a 62 y.o. female. Mass   The history is provided by the patient (two, one superior to both clavicles, mostly on the right). This is a new problem. Episode onset: she first noticed them 2 months ago. The problem occurs constantly. The problem has not changed (it gets bigger and smaller) since onset. Pertinent negatives include no headaches. Associated symptoms comments: Occasional warmth, no pain or redness. Nothing aggravates the symptoms. She has tried nothing for the symptoms. Review of Systems   Constitutional: Negative for chills, fever, malaise/fatigue and weight loss. HENT: Negative for congestion, ear pain and sore throat. Respiratory: Negative for cough and wheezing. Genitourinary:        No breast lumps or nipple discharge. Musculoskeletal:        2 supraclavicular masses   Skin:        The masses are sometimes warm. Neurological: Negative for headaches. Visit Vitals    BP (!) 121/93 (BP 1 Location: Left arm, BP Patient Position: Sitting)    Pulse 86    Temp 97.9 °F (36.6 °C) (Oral)    Resp 16    Ht 5' 5\" (1.651 m)    Wt 156 lb (70.8 kg)    LMP 01/01/2009    BMI 25.96 kg/m2     BP Readings from Last 3 Encounters:   01/18/17 (!) 121/93   02/09/16 119/84   10/19/15 (!) 130/92       Physical Exam   Constitutional: She is oriented to person, place, and time. She appears well-developed and well-nourished. No distress. Musculoskeletal:        Arms:  Clavicles nontender   Neurological: She is alert and oriented to person, place, and time. Skin: She is not diaphoretic. ASSESSMENT and PLAN    ICD-10-CM ICD-9-CM    1. Localized skin mass, lump, or swelling R22.9 782.2 US THYROID/PARATHYROID/SOFT TISS        Bilateral, supraclavicular, likely lipomas, although not as distinct  ultrasound  Follow up if they change or become painful    Follow-up Disposition:  Return if symptoms worsen or fail to improve.       Reviewed plan of Spoke with Nicci. She is discharged and home. She is feeling ok at this time. She is wondering about follow up. Message to dr leventhal   care.  Patient has provided input and agrees with goals.

## 2021-01-06 DIAGNOSIS — G47.00 INSOMNIA, UNSPECIFIED TYPE: ICD-10-CM

## 2021-01-07 RX ORDER — TRAZODONE HYDROCHLORIDE 50 MG/1
TABLET ORAL
Qty: 90 TAB | Refills: 3 | Status: SHIPPED
Start: 2021-01-07 | End: 2021-06-07

## 2021-04-04 DIAGNOSIS — E78.00 HYPERCHOLESTEROLEMIA: ICD-10-CM

## 2021-04-06 RX ORDER — PRAVASTATIN SODIUM 40 MG/1
TABLET ORAL
Qty: 90 TAB | Refills: 2 | Status: SHIPPED | OUTPATIENT
Start: 2021-04-06 | End: 2022-07-27

## 2021-06-07 ENCOUNTER — OFFICE VISIT (OUTPATIENT)
Dept: FAMILY MEDICINE CLINIC | Age: 66
End: 2021-06-07
Payer: OTHER GOVERNMENT

## 2021-06-07 VITALS
WEIGHT: 160 LBS | HEIGHT: 65 IN | HEART RATE: 86 BPM | TEMPERATURE: 97.4 F | BODY MASS INDEX: 26.66 KG/M2 | SYSTOLIC BLOOD PRESSURE: 124 MMHG | DIASTOLIC BLOOD PRESSURE: 82 MMHG | RESPIRATION RATE: 20 BRPM

## 2021-06-07 DIAGNOSIS — F41.9 ANXIETY: ICD-10-CM

## 2021-06-07 DIAGNOSIS — I10 ESSENTIAL HYPERTENSION: Primary | ICD-10-CM

## 2021-06-07 DIAGNOSIS — F33.0 DEPRESSION, MAJOR, RECURRENT, MILD (HCC): ICD-10-CM

## 2021-06-07 PROCEDURE — 99214 OFFICE O/P EST MOD 30 MIN: CPT | Performed by: FAMILY MEDICINE

## 2021-06-07 RX ORDER — SERTRALINE HYDROCHLORIDE 50 MG/1
50 TABLET, FILM COATED ORAL DAILY
Qty: 30 TABLET | Refills: 0 | Status: SHIPPED | OUTPATIENT
Start: 2021-06-07 | End: 2021-07-12 | Stop reason: SDUPTHER

## 2021-06-07 RX ORDER — AMLODIPINE BESYLATE 5 MG/1
TABLET ORAL
Qty: 90 TABLET | Refills: 4 | Status: SHIPPED | OUTPATIENT
Start: 2021-06-07 | End: 2022-01-11

## 2021-06-07 NOTE — PROGRESS NOTES
HISTORY OF PRESENT ILLNESS  Mallory Sepulveda is a 60 y.o. female. Mallory Sepulveda is here to follow up on their HTN. She stopped her Zoloft for her depression 2 months ago because she wanted to try not taking anything and has felt fine. Review of Systems   Constitutional: Negative for malaise/fatigue and weight loss. No weight gain   Eyes: Negative for blurred vision. Respiratory: Negative for shortness of breath. Cardiovascular: Negative for chest pain and palpitations. Neurological: Negative for dizziness, sensory change, speech change, focal weakness and headaches. Psychiatric/Behavioral: Negative for depression, hallucinations, substance abuse and suicidal ideas. The patient is nervous/anxious. The patient does not have insomnia. Visit Vitals  /82   Pulse 86   Temp 97.4 °F (36.3 °C) (Tympanic)   Resp 20   Ht 5' 5\" (1.651 m)   Wt 160 lb (72.6 kg)   LMP 01/01/2009   BMI 26.63 kg/m²     Physical Exam  Vitals and nursing note reviewed. Constitutional:       General: She is not in acute distress. Appearance: Normal appearance. She is well-developed. She is not diaphoretic. Cardiovascular:      Rate and Rhythm: Normal rate and regular rhythm. Heart sounds: Normal heart sounds. No murmur heard. No friction rub. No gallop. Pulmonary:      Effort: Pulmonary effort is normal. No respiratory distress. Breath sounds: Normal breath sounds. No wheezing or rales. Skin:     General: Skin is warm and dry. Neurological:      Mental Status: She is alert and oriented to person, place, and time. Motor: No tremor. Psychiatric:         Mood and Affect: Mood normal.         Behavior: Behavior normal.         Thought Content: Thought content normal.         Judgment: Judgment normal.         ASSESSMENT and PLAN    ICD-10-CM ICD-9-CM    1. Essential hypertension  O25 786.1 METABOLIC PANEL, BASIC      amLODIPine (NORVASC) 5 mg tablet   2.  Depression, major, recurrent, mild (HCC)  F33.0 296.31 sertraline (Zoloft) 50 mg tablet   3. Anxiety  F41.9 300.00 sertraline (Zoloft) 50 mg tablet        Blood pressure controlled  Depression doing well but anxiety is not  Restart Zoloft  Labs per orders. Refills per orders    Follow-up and Dispositions    · Return in about 3 weeks (around 6/28/2021) for depression, anxiety. Reviewed plan of care. Patient has provided input and agrees with goals.

## 2021-07-12 ENCOUNTER — HOSPITAL ENCOUNTER (OUTPATIENT)
Dept: NON INVASIVE DIAGNOSTICS | Age: 66
Discharge: HOME | End: 2021-07-12
Payer: OTHER GOVERNMENT

## 2021-07-12 ENCOUNTER — OFFICE VISIT (OUTPATIENT)
Dept: FAMILY MEDICINE CLINIC | Age: 66
End: 2021-07-12
Payer: OTHER GOVERNMENT

## 2021-07-12 VITALS
SYSTOLIC BLOOD PRESSURE: 126 MMHG | WEIGHT: 152 LBS | OXYGEN SATURATION: 98 % | RESPIRATION RATE: 16 BRPM | HEART RATE: 79 BPM | DIASTOLIC BLOOD PRESSURE: 83 MMHG | HEIGHT: 65 IN | BODY MASS INDEX: 25.33 KG/M2 | TEMPERATURE: 97.5 F

## 2021-07-12 DIAGNOSIS — R07.9 CHEST PAIN, UNSPECIFIED TYPE: Primary | ICD-10-CM

## 2021-07-12 DIAGNOSIS — R07.9 CHEST PAIN, UNSPECIFIED TYPE: ICD-10-CM

## 2021-07-12 DIAGNOSIS — F33.0 DEPRESSION, MAJOR, RECURRENT, MILD (HCC): ICD-10-CM

## 2021-07-12 DIAGNOSIS — F41.9 ANXIETY: ICD-10-CM

## 2021-07-12 DIAGNOSIS — I10 ESSENTIAL HYPERTENSION: ICD-10-CM

## 2021-07-12 LAB
ATRIAL RATE: 60 BPM
CALCULATED P AXIS, ECG09: 19 DEGREES
CALCULATED R AXIS, ECG10: -44 DEGREES
CALCULATED T AXIS, ECG11: 34 DEGREES
DIAGNOSIS, 93000: NORMAL
P-R INTERVAL, ECG05: 152 MS
Q-T INTERVAL, ECG07: 430 MS
QRS DURATION, ECG06: 80 MS
QTC CALCULATION (BEZET), ECG08: 430 MS
VENTRICULAR RATE, ECG03: 60 BPM

## 2021-07-12 PROCEDURE — 99214 OFFICE O/P EST MOD 30 MIN: CPT | Performed by: FAMILY MEDICINE

## 2021-07-12 PROCEDURE — 93005 ELECTROCARDIOGRAM TRACING: CPT

## 2021-07-12 RX ORDER — SERTRALINE HYDROCHLORIDE 50 MG/1
50 TABLET, FILM COATED ORAL DAILY
Qty: 90 TABLET | Refills: 4 | Status: SHIPPED | OUTPATIENT
Start: 2021-07-12 | End: 2022-04-01

## 2021-07-12 NOTE — PROGRESS NOTES
Chief Complaint   Patient presents with    Follow-up     HTN Med compliant appears controlled no other concerns at this time

## 2021-07-12 NOTE — PROGRESS NOTES
HISTORY OF PRESENT ILLNESS  Edwina Ferro is a 60 y.o. female. Patient presents with: Follow-up: HTN Med compliant appears controlled no other concerns at this time     She also needs to follow up on her depression and anxiety. Her Zoloft was restarted at her last visit and she is doing well. Review of Systems   Constitutional: Negative for malaise/fatigue and weight loss. No weight gain   Eyes: Negative for blurred vision. Respiratory: Negative for shortness of breath. Cardiovascular: Positive for chest pain. Negative for palpitations. She has been having chest pressure for about 1/2 hour when she first gets up in the am.  The pain does not radiate. It is no worse with activity. She exercises by walking, riding her bike and golfing without difficulty. Neurological: Negative for dizziness, sensory change, speech change, focal weakness and headaches. Psychiatric/Behavioral: Negative for depression, hallucinations, substance abuse and suicidal ideas. The patient has insomnia. The patient is not nervous/anxious. Visit Vitals  /83   Pulse 79   Temp 97.5 °F (36.4 °C) (Oral)   Resp 16   Ht 5' 5\" (1.651 m)   Wt 152 lb (68.9 kg)   LMP 01/01/2009   SpO2 98%   BMI 25.29 kg/m²     Physical Exam  Vitals and nursing note reviewed. Constitutional:       General: She is not in acute distress. Appearance: Normal appearance. She is well-developed. She is not diaphoretic. Cardiovascular:      Rate and Rhythm: Normal rate and regular rhythm. Heart sounds: Normal heart sounds. No murmur heard. No friction rub. No gallop. Pulmonary:      Effort: Pulmonary effort is normal. No respiratory distress. Breath sounds: Normal breath sounds. No wheezing or rales. Skin:     General: Skin is warm and dry. Neurological:      Mental Status: She is alert and oriented to person, place, and time. Motor: No tremor.    Psychiatric:         Mood and Affect: Mood normal. Behavior: Behavior normal.         Thought Content: Thought content normal.         Judgment: Judgment normal.         ASSESSMENT and PLAN    ICD-10-CM ICD-9-CM    1. Chest pain, unspecified type  R07.9 786.50 REFERRAL TO CARDIOLOGY      EKG, 12 LEAD, INITIAL   2. Depression, major, recurrent, mild (HCC)  F33.0 296.31 sertraline (Zoloft) 50 mg tablet   3. Anxiety  F41.9 300.00 sertraline (Zoloft) 50 mg tablet   4. Essential hypertension  I10 401.9         Chest pain, some risk factors for coronary artery disease  Depression and anxiety doing well  Blood pressure controlled  EKG  Continue current plans. Cardiology referral  Refills per orders    Follow-up and Dispositions    · Return in about 6 months (around 1/12/2022) for blood pressure. Reviewed plan of care. Patient has provided input and agrees with goals.

## 2021-07-13 ENCOUNTER — TELEPHONE (OUTPATIENT)
Dept: FAMILY MEDICINE CLINIC | Age: 66
End: 2021-07-13

## 2021-07-13 NOTE — TELEPHONE ENCOUNTER
Please call patient and let her know her EKG has some non-specific findings, so I would like for her to see Cardiology.

## 2022-01-10 DIAGNOSIS — I10 ESSENTIAL HYPERTENSION: ICD-10-CM

## 2022-01-10 NOTE — LETTER
1/12/2022 8:28 AM    Ms. Georgina Bazan  [address] [facility],[address]      Dear Ms. Macias:    We've missed you! Please call our office at [phone] and schedule a follow up appointment for your continued care, with in the next 60 days. I will be unable to continue refilling your medication until you have been seen for an appointment. Look forward to seeing you soon.        Sincerely,      Thea Davila MD

## 2022-01-11 RX ORDER — AMLODIPINE BESYLATE 5 MG/1
TABLET ORAL
Qty: 90 TABLET | Refills: 4 | Status: SHIPPED | OUTPATIENT
Start: 2022-01-11 | End: 2022-05-18 | Stop reason: SDUPTHER

## 2022-03-20 PROBLEM — I10 ESSENTIAL HYPERTENSION: Status: ACTIVE | Noted: 2017-12-15

## 2022-04-01 ENCOUNTER — OFFICE VISIT (OUTPATIENT)
Dept: FAMILY MEDICINE CLINIC | Age: 67
End: 2022-04-01
Payer: MEDICARE

## 2022-04-01 VITALS
SYSTOLIC BLOOD PRESSURE: 113 MMHG | HEIGHT: 65 IN | OXYGEN SATURATION: 97 % | DIASTOLIC BLOOD PRESSURE: 73 MMHG | TEMPERATURE: 97.1 F | RESPIRATION RATE: 16 BRPM | WEIGHT: 148 LBS | HEART RATE: 88 BPM | BODY MASS INDEX: 24.66 KG/M2

## 2022-04-01 DIAGNOSIS — R22.2 SUPRACLAVICULAR MASS: ICD-10-CM

## 2022-04-01 DIAGNOSIS — E78.00 HYPERCHOLESTEROLEMIA: ICD-10-CM

## 2022-04-01 DIAGNOSIS — F33.0 DEPRESSION, MAJOR, RECURRENT, MILD (HCC): ICD-10-CM

## 2022-04-01 DIAGNOSIS — F41.9 ANXIETY: ICD-10-CM

## 2022-04-01 DIAGNOSIS — I10 ESSENTIAL HYPERTENSION: Primary | ICD-10-CM

## 2022-04-01 PROCEDURE — G8752 SYS BP LESS 140: HCPCS | Performed by: FAMILY MEDICINE

## 2022-04-01 PROCEDURE — G8754 DIAS BP LESS 90: HCPCS | Performed by: FAMILY MEDICINE

## 2022-04-01 PROCEDURE — 99214 OFFICE O/P EST MOD 30 MIN: CPT | Performed by: FAMILY MEDICINE

## 2022-04-01 PROCEDURE — G8536 NO DOC ELDER MAL SCRN: HCPCS | Performed by: FAMILY MEDICINE

## 2022-04-01 PROCEDURE — G8420 CALC BMI NORM PARAMETERS: HCPCS | Performed by: FAMILY MEDICINE

## 2022-04-01 PROCEDURE — 1101F PT FALLS ASSESS-DOCD LE1/YR: CPT | Performed by: FAMILY MEDICINE

## 2022-04-01 PROCEDURE — G8399 PT W/DXA RESULTS DOCUMENT: HCPCS | Performed by: FAMILY MEDICINE

## 2022-04-01 PROCEDURE — 3017F COLORECTAL CA SCREEN DOC REV: CPT | Performed by: FAMILY MEDICINE

## 2022-04-01 PROCEDURE — G8427 DOCREV CUR MEDS BY ELIG CLIN: HCPCS | Performed by: FAMILY MEDICINE

## 2022-04-01 PROCEDURE — 1090F PRES/ABSN URINE INCON ASSESS: CPT | Performed by: FAMILY MEDICINE

## 2022-04-01 PROCEDURE — G9717 DOC PT DX DEP/BP F/U NT REQ: HCPCS | Performed by: FAMILY MEDICINE

## 2022-04-01 RX ORDER — SERTRALINE HYDROCHLORIDE 100 MG/1
100 TABLET, FILM COATED ORAL DAILY
Qty: 30 TABLET | Refills: 0 | Status: SHIPPED | OUTPATIENT
Start: 2022-04-01 | End: 2022-04-27

## 2022-04-01 NOTE — PROGRESS NOTES
HISTORY OF PRESENT ILLNESS  Jacki Nelson is a 73 y.o. female. Patient presents with:  Hypertension: BP check and would like neck looked at secondary to being \"puffy\"    Her neck puffiness started a year ago on the right, now it is also in the left and in the front. It causes pressure. Also, she needs to follow up with her depression and anxiety. The depression is doing well, but she still has problems with anxiety. Review of Systems   Constitutional: Positive for malaise/fatigue and weight loss. No weight gain. She has lost weight through lifestyle modifications. Eyes: Negative for blurred vision. Respiratory: Negative for shortness of breath. Cardiovascular: Negative for chest pain and palpitations. Musculoskeletal: Negative for neck pain. Neurological: Negative for dizziness, sensory change, speech change, focal weakness and headaches. Psychiatric/Behavioral: Negative for depression, hallucinations, substance abuse and suicidal ideas. The patient is nervous/anxious and has insomnia. Visit Vitals  /73   Pulse 88   Temp 97.1 °F (36.2 °C)   Resp 16   Ht 5' 5\" (1.651 m)   Wt 148 lb (67.1 kg)   LMP 01/01/2009   SpO2 97%   BMI 24.63 kg/m²     Physical Exam  Vitals and nursing note reviewed. Constitutional:       General: She is not in acute distress. Appearance: Normal appearance. She is well-developed. She is not diaphoretic. Neck:      Thyroid: No thyroid mass, thyromegaly or thyroid tenderness. Cardiovascular:      Rate and Rhythm: Normal rate and regular rhythm. Heart sounds: Normal heart sounds. No murmur heard. No friction rub. No gallop. Pulmonary:      Effort: Pulmonary effort is normal. No respiratory distress. Breath sounds: Normal breath sounds. No wheezing or rales. Chest:   Breasts:      Right: No supraclavicular adenopathy. Left: No supraclavicular adenopathy.         Comments: Supraclavicular swelling/? Mass, right greater than left  Lymphadenopathy:      Cervical: No cervical adenopathy. Upper Body:      Right upper body: No supraclavicular adenopathy. Left upper body: No supraclavicular adenopathy. Skin:     General: Skin is warm and dry. Neurological:      Mental Status: She is alert and oriented to person, place, and time. Motor: No tremor. Psychiatric:         Mood and Affect: Mood normal.         Behavior: Behavior normal.         Thought Content: Thought content normal.         Judgment: Judgment normal.         ASSESSMENT and PLAN    ICD-10-CM ICD-9-CM    1. Essential hypertension  T57 937.7 METABOLIC PANEL, BASIC   2. Supraclavicular mass  R22.2 786.6 CT CHEST W WO CONT   3. Depression, major, recurrent, mild (HCC)  F33.0 296.31 sertraline (Zoloft) 100 mg tablet   4. Anxiety  F41.9 300.00 sertraline (Zoloft) 100 mg tablet   5. Hypercholesterolemia  E78.00 272.0 LIPID PANEL      HEPATIC FUNCTION PANEL        Blood pressure controlled  Uncertain etiology of neck swelling/possible mass  Anxiety could use some improvement  Chest CT  Increase Zoloft  Labs per orders. Follow-up and Dispositions    · Return in about 3 weeks (around 4/22/2022) for depression, anxiety. Reviewed plan of care. Patient has provided input and agrees with goals.

## 2022-04-01 NOTE — PROGRESS NOTES
Chief Complaint   Patient presents with    Hypertension     BP check and would like neck looked at secondary to being \"puffy\"

## 2022-04-08 ENCOUNTER — HOSPITAL ENCOUNTER (OUTPATIENT)
Dept: CT IMAGING | Age: 67
Discharge: HOME | End: 2022-04-08
Attending: FAMILY MEDICINE
Payer: MEDICARE

## 2022-04-08 DIAGNOSIS — R22.2 SUPRACLAVICULAR MASS: ICD-10-CM

## 2022-04-08 PROCEDURE — 71260 CT THORAX DX C+: CPT

## 2022-04-08 PROCEDURE — 74011000636 HC RX REV CODE- 636: Performed by: FAMILY MEDICINE

## 2022-04-08 RX ADMIN — IOPAMIDOL 100 ML: 612 INJECTION, SOLUTION INTRAVENOUS at 15:51

## 2022-04-09 ENCOUNTER — TELEPHONE (OUTPATIENT)
Dept: FAMILY MEDICINE CLINIC | Age: 67
End: 2022-04-09

## 2022-04-09 DIAGNOSIS — E04.1 THYROID NODULE: Primary | ICD-10-CM

## 2022-04-09 NOTE — TELEPHONE ENCOUNTER
Please call patient and let her know her CT shows that the areas of swelling are fatty tissue, however, she does have a tiny thyroid nodule.   I would like for her to see Endocrinology and have printed a referral request.

## 2022-04-11 NOTE — TELEPHONE ENCOUNTER
Called and spoke with pt. She has been advised and states understanding of results and has been provided name for Dr. Jovana Amador. Pt will call to schedule appointment.

## 2022-04-27 ENCOUNTER — OFFICE VISIT (OUTPATIENT)
Dept: FAMILY MEDICINE CLINIC | Age: 67
End: 2022-04-27
Payer: MEDICARE

## 2022-04-27 VITALS
RESPIRATION RATE: 20 BRPM | HEART RATE: 101 BPM | OXYGEN SATURATION: 97 % | WEIGHT: 148 LBS | TEMPERATURE: 97.7 F | HEIGHT: 65 IN | SYSTOLIC BLOOD PRESSURE: 142 MMHG | BODY MASS INDEX: 24.66 KG/M2 | DIASTOLIC BLOOD PRESSURE: 91 MMHG

## 2022-04-27 DIAGNOSIS — F33.0 DEPRESSION, MAJOR, RECURRENT, MILD (HCC): Primary | ICD-10-CM

## 2022-04-27 DIAGNOSIS — Z12.31 ENCOUNTER FOR SCREENING MAMMOGRAM FOR MALIGNANT NEOPLASM OF BREAST: ICD-10-CM

## 2022-04-27 DIAGNOSIS — I10 ESSENTIAL HYPERTENSION: ICD-10-CM

## 2022-04-27 DIAGNOSIS — F41.9 ANXIETY: ICD-10-CM

## 2022-04-27 DIAGNOSIS — G25.0 ESSENTIAL TREMOR: ICD-10-CM

## 2022-04-27 DIAGNOSIS — Z23 ENCOUNTER FOR IMMUNIZATION: ICD-10-CM

## 2022-04-27 DIAGNOSIS — E78.00 HYPERCHOLESTEROLEMIA: ICD-10-CM

## 2022-04-27 LAB
ALBUMIN SERPL-MCNC: 4.1 G/DL (ref 3.5–5)
ALBUMIN/GLOB SERPL: 1.2 {RATIO} (ref 1.1–2.2)
ALP SERPL-CCNC: 91 U/L (ref 45–117)
ALT SERPL-CCNC: 61 U/L (ref 12–78)
ANION GAP SERPL CALC-SCNC: 7 MMOL/L (ref 5–15)
AST SERPL-CCNC: 67 U/L (ref 15–37)
BILIRUB DIRECT SERPL-MCNC: 0.3 MG/DL (ref 0–0.2)
BILIRUB SERPL-MCNC: 0.9 MG/DL (ref 0.2–1)
BUN SERPL-MCNC: 7 MG/DL (ref 6–20)
BUN/CREAT SERPL: 12 (ref 12–20)
CALCIUM SERPL-MCNC: 9 MG/DL (ref 8.5–10.1)
CHLORIDE SERPL-SCNC: 104 MMOL/L (ref 97–108)
CHOLEST SERPL-MCNC: 225 MG/DL
CO2 SERPL-SCNC: 28 MMOL/L (ref 21–32)
CREAT SERPL-MCNC: 0.57 MG/DL (ref 0.55–1.02)
GLOBULIN SER CALC-MCNC: 3.4 G/DL (ref 2–4)
GLUCOSE SERPL-MCNC: 102 MG/DL (ref 65–100)
HDLC SERPL-MCNC: 119 MG/DL
HDLC SERPL: 1.9 {RATIO} (ref 0–5)
LDLC SERPL CALC-MCNC: 89.6 MG/DL (ref 0–100)
POTASSIUM SERPL-SCNC: 3.8 MMOL/L (ref 3.5–5.1)
PROT SERPL-MCNC: 7.5 G/DL (ref 6.4–8.2)
SODIUM SERPL-SCNC: 139 MMOL/L (ref 136–145)
TRIGL SERPL-MCNC: 82 MG/DL (ref ?–150)
VLDLC SERPL CALC-MCNC: 16.4 MG/DL

## 2022-04-27 PROCEDURE — 3017F COLORECTAL CA SCREEN DOC REV: CPT | Performed by: FAMILY MEDICINE

## 2022-04-27 PROCEDURE — 99214 OFFICE O/P EST MOD 30 MIN: CPT | Performed by: FAMILY MEDICINE

## 2022-04-27 PROCEDURE — G8536 NO DOC ELDER MAL SCRN: HCPCS | Performed by: FAMILY MEDICINE

## 2022-04-27 PROCEDURE — G8399 PT W/DXA RESULTS DOCUMENT: HCPCS | Performed by: FAMILY MEDICINE

## 2022-04-27 PROCEDURE — 1090F PRES/ABSN URINE INCON ASSESS: CPT | Performed by: FAMILY MEDICINE

## 2022-04-27 PROCEDURE — G8753 SYS BP > OR = 140: HCPCS | Performed by: FAMILY MEDICINE

## 2022-04-27 PROCEDURE — 1101F PT FALLS ASSESS-DOCD LE1/YR: CPT | Performed by: FAMILY MEDICINE

## 2022-04-27 PROCEDURE — G9899 SCRN MAM PERF RSLTS DOC: HCPCS | Performed by: FAMILY MEDICINE

## 2022-04-27 PROCEDURE — G8427 DOCREV CUR MEDS BY ELIG CLIN: HCPCS | Performed by: FAMILY MEDICINE

## 2022-04-27 PROCEDURE — G8755 DIAS BP > OR = 90: HCPCS | Performed by: FAMILY MEDICINE

## 2022-04-27 PROCEDURE — G8420 CALC BMI NORM PARAMETERS: HCPCS | Performed by: FAMILY MEDICINE

## 2022-04-27 PROCEDURE — G9717 DOC PT DX DEP/BP F/U NT REQ: HCPCS | Performed by: FAMILY MEDICINE

## 2022-04-27 RX ORDER — BUSPIRONE HYDROCHLORIDE 5 MG/1
5 TABLET ORAL 2 TIMES DAILY
Qty: 60 TABLET | Refills: 0 | Status: SHIPPED | OUTPATIENT
Start: 2022-04-27 | End: 2022-05-29

## 2022-04-27 RX ORDER — SERTRALINE HYDROCHLORIDE 50 MG/1
50 TABLET, FILM COATED ORAL DAILY
Qty: 30 TABLET | Refills: 0 | Status: SHIPPED
Start: 2022-04-27 | End: 2022-09-20

## 2022-04-27 NOTE — PROGRESS NOTES
HISTORY OF PRESENT ILLNESS  Nighat Stephenson is a 73 y.o. female. Patient presents with:  Depression: follow up/pt states she is feeling better   Anxiety: follow up     She is better and the Zoloft is working well, however she has developed a tremor since increasing. Her mother has essential tremor. Review of Systems   Constitutional: Negative for malaise/fatigue and weight loss. No weight gain   Respiratory: Negative for shortness of breath. Cardiovascular: Negative for chest pain and palpitations. Psychiatric/Behavioral: Negative for depression, hallucinations, substance abuse and suicidal ideas. The patient is nervous/anxious. The patient does not have insomnia. Visit Vitals  BP (!) 142/91   Pulse (!) 101   Temp 97.7 °F (36.5 °C) (Temporal)   Resp 20   Ht 5' 5\" (1.651 m)   Wt 148 lb (67.1 kg)   LMP 01/01/2009   SpO2 97%   BMI 24.63 kg/m²     BP Readings from Last 3 Encounters:   04/27/22 (!) 142/91   04/01/22 113/73   07/12/21 126/83     Physical Exam  Constitutional:       General: She is not in acute distress. Appearance: Normal appearance. Musculoskeletal:      Comments: Fine, rest tremor   Neurological:      Mental Status: She is alert and oriented to person, place, and time. Motor: No tremor. Psychiatric:         Mood and Affect: Mood normal.         Behavior: Behavior normal.         Thought Content: Thought content normal.         Judgment: Judgment normal.         ASSESSMENT and PLAN    ICD-10-CM ICD-9-CM    1. Depression, major, recurrent, mild (HCC)  F33.0 296.31 sertraline (Zoloft) 50 mg tablet   2. Anxiety  F41.9 300.00 sertraline (Zoloft) 50 mg tablet      busPIRone (BUSPAR) 5 mg tablet   3. Essential tremor  G25.0 333.1    4. Encounter for screening mammogram for malignant neoplasm of breast  Z12.31 V76.12 [facility] MAMMO BI SCREENING INCL CAD   5.  Encounter for immunization  Z23 V03.89 varicella-zoster recombinant, PF, (SHINGRIX) 50 mcg/0.5 mL susr injection Depression doing well, still with some anxiety, tremor after increasing Zoloft  Decrease Zoloft  Add Buspar  Mammogram  Shingrix at pharmacy    Follow-up and Dispositions    · Return in about 3 months (around 7/27/2022) for anxiety, depression, tremor. Reviewed plan of care. Patient has provided input and agrees with goals.

## 2022-04-27 NOTE — PATIENT INSTRUCTIONS
Benign Essential Tremor: Care Instructions  Your Care Instructions     Benign essential tremor is a medical term for shaking that you can't control. Your hand or fingers may shake when you lift a cup or point at something. Or your voice may shake when you speak. This type of tremor is not harmful. It is not caused by a stroke or Parkinson's disease. Some things can affect how much you shake. For example, drinking or eating something with caffeine may make tremors worse for a while. Some medicines also can increase tremors. These include antidepressants and too much thyroid replacement. Talk to your doctor if you think one of your medicines makes your tremors worse. If you are self-conscious about your tremors, there are some things you can do to reduce them or make them less noticeable. This includes taking medicine. Follow-up care is a key part of your treatment and safety. Be sure to make and go to all appointments, and call your doctor if you are having problems. It's also a good idea to know your test results and keep a list of the medicines you take. How can you care for yourself at home? · Take your medicines exactly as prescribed. Call your doctor if you think you are having a problem with your medicine. Some medicines that help control tremors have to be taken every day, even if you are not having tremors. You will get more details on the specific medicines your doctor prescribes. · Get plenty of rest.  · Eat a balanced, healthy diet. · Try to reduce stress. Regular exercise and massages may help. · Limit alcohol. Heavy drinking can make your tremors worse. · Avoid drinks or foods with caffeine if they make your tremors worse. These include tea, cola, coffee, and chocolate. · Wear a heavy bracelet or watch. This adds a little weight to your hand. The extra weight may reduce tremors. · Drink from cups or glasses that are only half full. You may also want to try drinking with a straw.   When should you call for help? Watch closely for changes in your health, and be sure to contact your doctor if:    · You notice your tremors are getting worse.     · You can't do your everyday activities because of your tremors.     · You are sad and embarrassed about your shaking. Where can you learn more? Go to http://www.gray.com/  Enter B746 in the search box to learn more about \"Benign Essential Tremor: Care Instructions. \"  Current as of: December 13, 2021               Content Version: 13.2  © 2810-8637 [employer]. Care instructions adapted under license by [employer] (which disclaims liability or warranty for this information). If you have questions about a medical condition or this instruction, always ask your healthcare professional. [address] any warranty or liability for your use of this information.

## 2022-04-27 NOTE — PROGRESS NOTES
Chief Complaint   Patient presents with    Depression     follow up/pt states she is feeling better     Anxiety     follow up

## 2022-05-01 DIAGNOSIS — R79.89 ELEVATED LFTS: Primary | ICD-10-CM

## 2022-05-17 ENCOUNTER — HOSPITAL ENCOUNTER (OUTPATIENT)
Dept: MAMMOGRAPHY | Age: 67
Discharge: HOME | End: 2022-05-17
Attending: FAMILY MEDICINE
Payer: MEDICARE

## 2022-05-17 DIAGNOSIS — Z12.31 ENCOUNTER FOR SCREENING MAMMOGRAM FOR MALIGNANT NEOPLASM OF BREAST: ICD-10-CM

## 2022-05-17 PROCEDURE — 77067 SCR MAMMO BI INCL CAD: CPT

## 2022-05-18 ENCOUNTER — OFFICE VISIT (OUTPATIENT)
Dept: FAMILY MEDICINE CLINIC | Age: 67
End: 2022-05-18

## 2022-05-18 DIAGNOSIS — I10 ESSENTIAL HYPERTENSION: ICD-10-CM

## 2022-05-18 NOTE — TELEPHONE ENCOUNTER
Pt rescheduled visit today to Friday due to provider running behind schedule but is out of blood pressure medication and is asking if refills can be sent to [facility] please. Pt out of medication.  Tyrell Spoke with Lupe. She was unable to find anything information as to why urine was never collected after order was received. She said that they could go out to the patient tomorrow for collection. LM on  for patient's daughter Orly asking she follow up with me regarding patient's symptoms and if she would prefer to come  supplies to drop off a specimen here at the office or if she wants the lab to come out tomorrow.

## 2022-05-20 ENCOUNTER — VIRTUAL VISIT (OUTPATIENT)
Dept: FAMILY MEDICINE CLINIC | Age: 67
End: 2022-05-20
Payer: MEDICARE

## 2022-05-20 DIAGNOSIS — F33.0 DEPRESSION, MAJOR, RECURRENT, MILD (HCC): Primary | ICD-10-CM

## 2022-05-20 DIAGNOSIS — F41.9 ANXIETY: ICD-10-CM

## 2022-05-20 DIAGNOSIS — G25.0 ESSENTIAL TREMOR: ICD-10-CM

## 2022-05-20 PROCEDURE — 99443 PR PHYS/QHP TELEPHONE EVALUATION 21-30 MIN: CPT | Performed by: FAMILY MEDICINE

## 2022-05-20 RX ORDER — PROPRANOLOL HYDROCHLORIDE 80 MG/1
80 CAPSULE, EXTENDED RELEASE ORAL DAILY
Qty: 90 CAPSULE | Refills: 0 | Status: SHIPPED | OUTPATIENT
Start: 2022-05-20 | End: 2022-07-27

## 2022-05-20 NOTE — PROGRESS NOTES
Chief Complaint   Patient presents with    Medication Evaluation     1. Have you been to the ER, urgent care clinic since your last visit? Hospitalized since your last visit? No    2. Have you seen or consulted any other health care providers outside of the [address] [place] [address] since your last visit? Include any pap smears or colon screening.  No

## 2022-05-20 NOTE — PROGRESS NOTES
Cosmo Gusman is a 73 y.o. female evaluated via telephone on 5/20/2022. Consent:  She and/or health care decision maker is aware that she may receive a bill for this telephone service, depending on her insurance coverage, and has provided verbal consent to proceed: Yes      Documentation:  I communicated with the patient and/or health care decision maker about depression, anxiety and tremor. Details of this discussion including any medical advice provided:       Subjective:   Cosmo Gusman was seen for Medication Evaluation      Cosmo Gusman is here to follow up on their depression and anxiety. At her last visit, Zoloft was decreased due to tremor, and Buspar was added. Donzell Pyo He/she is fine, but she shakes like a leaf sometimes. Review of Systems   Constitutional: Negative for malaise/fatigue and weight loss. No weight gain   Respiratory: Negative for shortness of breath. Cardiovascular: Negative for chest pain and palpitations. Psychiatric/Behavioral: Negative for depression, hallucinations, substance abuse and suicidal ideas. The patient is not nervous/anxious and does not have insomnia. Objective:     /84    Assessment & Plan:   Diagnoses and all orders for this visit:    1. Depression, major, recurrent, mild ([facility] [address]    2. Anxiety    3. Essential tremor  -     propranolol LA (INDERAL LA) 80 mg SR capsule; Take 1 Capsule by mouth daily. Depression and anxiety doing well, still with significant tremor  Start Inderal LA    Follow-up and Dispositions    · Return in about 6 weeks (around 7/1/2022) for essential tremor - in office. Reviewed plan of care. Patient has provided input and agrees with goals. I affirm this is a Patient Initiated Episode with an Established Patient who has not had a related appointment within my department in the past 7 days or scheduled within the next 24 hours.     Total Time: minutes: 21-30 minutes    Note: not billable if this call serves to triage the patient into an appointment for the relevant concern      Julio C Campos MD

## 2022-05-23 RX ORDER — AMLODIPINE BESYLATE 5 MG/1
5 TABLET ORAL DAILY
Qty: 90 TABLET | Refills: 3 | Status: SHIPPED | OUTPATIENT
Start: 2022-05-23

## 2022-05-26 DIAGNOSIS — F41.9 ANXIETY: ICD-10-CM

## 2022-05-29 RX ORDER — BUSPIRONE HYDROCHLORIDE 5 MG/1
TABLET ORAL
Qty: 60 TABLET | Refills: 0 | Status: SHIPPED | OUTPATIENT
Start: 2022-05-29 | End: 2022-07-04

## 2022-06-23 ENCOUNTER — OFFICE VISIT (OUTPATIENT)
Dept: ENDOCRINOLOGY | Age: 67
End: 2022-06-23
Payer: MEDICARE

## 2022-06-23 VITALS
OXYGEN SATURATION: 98 % | WEIGHT: 144 LBS | BODY MASS INDEX: 23.99 KG/M2 | RESPIRATION RATE: 19 BRPM | HEART RATE: 63 BPM | SYSTOLIC BLOOD PRESSURE: 90 MMHG | HEIGHT: 65 IN | TEMPERATURE: 98 F | DIASTOLIC BLOOD PRESSURE: 65 MMHG

## 2022-06-23 DIAGNOSIS — E04.1 THYROID NODULE: Primary | ICD-10-CM

## 2022-06-23 PROCEDURE — G9899 SCRN MAM PERF RSLTS DOC: HCPCS | Performed by: INTERNAL MEDICINE

## 2022-06-23 PROCEDURE — 1123F ACP DISCUSS/DSCN MKR DOCD: CPT | Performed by: INTERNAL MEDICINE

## 2022-06-23 PROCEDURE — 3017F COLORECTAL CA SCREEN DOC REV: CPT | Performed by: INTERNAL MEDICINE

## 2022-06-23 PROCEDURE — G8754 DIAS BP LESS 90: HCPCS | Performed by: INTERNAL MEDICINE

## 2022-06-23 PROCEDURE — G9717 DOC PT DX DEP/BP F/U NT REQ: HCPCS | Performed by: INTERNAL MEDICINE

## 2022-06-23 PROCEDURE — G8752 SYS BP LESS 140: HCPCS | Performed by: INTERNAL MEDICINE

## 2022-06-23 PROCEDURE — G8420 CALC BMI NORM PARAMETERS: HCPCS | Performed by: INTERNAL MEDICINE

## 2022-06-23 PROCEDURE — 1090F PRES/ABSN URINE INCON ASSESS: CPT | Performed by: INTERNAL MEDICINE

## 2022-06-23 PROCEDURE — G8399 PT W/DXA RESULTS DOCUMENT: HCPCS | Performed by: INTERNAL MEDICINE

## 2022-06-23 PROCEDURE — G8536 NO DOC ELDER MAL SCRN: HCPCS | Performed by: INTERNAL MEDICINE

## 2022-06-23 PROCEDURE — G8427 DOCREV CUR MEDS BY ELIG CLIN: HCPCS | Performed by: INTERNAL MEDICINE

## 2022-06-23 PROCEDURE — 99204 OFFICE O/P NEW MOD 45 MIN: CPT | Performed by: INTERNAL MEDICINE

## 2022-06-23 PROCEDURE — 1101F PT FALLS ASSESS-DOCD LE1/YR: CPT | Performed by: INTERNAL MEDICINE

## 2022-06-23 NOTE — PROGRESS NOTES
Chief Complaint   Patient presents with    New Patient       * New Patient Visit     General:   CT chest in April for significant swelling supra-clavicular area - everyone once in awhile causes pressure on the right side  CT did not show enlarged thyroid gland, but showed a thyroid nodule  No recent TSH level  Mother - goiter - surgery  2 maternal aunts also with goiter    Thyroid Symptoms  denies change in energy, denies change in weight, denies change in appetite, denies sleep issues, denies hair changes, no skin changes, denies sweats, denies hot/cold intolerance, denies tremor, denies palpitations, denies change in bowels, no change in menses, denies mood changes      Neck Symptoms  denies dysphagia, denies anterior neck pain, denies neck swelling, notes no nodules    Labs/Studies:    4/8/22 CT Chest w/Con - THYROID: In the left lobe of thyroid gland there is a 9 mm hypodense nodule    Lab Results   Component Value Date/Time    TSH 2.28 01/12/2010 11:11 AM          Current Outpatient Medications   Medication Sig    busPIRone (BUSPAR) 5 mg tablet TAKE ONE TABLET BY MOUTH TWICE A DAY    amLODIPine (NORVASC) 5 mg tablet Take 1 Tablet by mouth daily.  propranolol LA (INDERAL LA) 80 mg SR capsule Take 1 Capsule by mouth daily.  sertraline (Zoloft) 50 mg tablet Take 1 Tablet by mouth daily.  pravastatin (PRAVACHOL) 40 mg tablet TAKE ONE TABLET BY MOUTH NIGHTLY (Patient not taking: Reported on 6/23/2022)     No current facility-administered medications for this visit. Past Medical History:   Diagnosis Date    Allergic conjunctivitis of both eyes 10/19/2015    Depression 9/11/2014    Essential hypertension 12/15/2017    Hot flashes 6/23/2014    Hot flashes     Hypercholesterolemia     Osteoarthritis of right knee 3/11/2014    Osteoarthritis of right knee 3/11/2014        Blood pressure 90/65, pulse 63, temperature 98 °F (36.7 °C), temperature source Oral, resp.  rate 19, height 5' 5\" (1.651 m), weight 144 lb (65.3 kg), last menstrual period 01/01/2009, SpO2 98 %. Weight Metrics 6/23/2022 4/27/2022 4/1/2022 7/12/2021 6/7/2021 12/7/2020 2/7/2020   Weight 144 lb 148 lb 148 lb 152 lb 160 lb 162 lb 156 lb   BMI 23.96 kg/m2 24.63 kg/m2 24.63 kg/m2 25.29 kg/m2 26.63 kg/m2 26.96 kg/m2 25.96 kg/m2        EXAM:  - GENERAL: NCAT, Appears well nourished   - EYES: EOMI, non-icteric, no proptosis   - Ear/Nose/Throat: NCAT, no visible inflammation or masses   - CARDIOVASCULAR: no cyanosis, no visible JVD   - RESPIRATORY: respiratory effort normal without any distress or labored breathing   - MUSCULOSKELETAL: Normal ROM of neck and upper extremities observed   - SKIN: No rash on face  - NEUROLOGIC:  No facial asymmetry (Cranial nerve 7 motor function), No gaze palsy   - PSYCHIATRIC: Normal affect, Normal insight and judgement      Assessment/Plan:   1. Thyroid nodule  9 mm nodule on chest CT  Get US to further define gland (size), nodule and any other nodules  Check TSH level    Orders Placed This Encounter    US THYROID/PARATHYROID/SOFT TISS     Standing Status:   Future     Standing Expiration Date:   6/23/2023     Order Specific Question:   Reason for Exam     Answer:   9mm nodule on CT - eval size and all nodules    TSH 3RD GENERATION        Follow-up and Dispositions    · Return in about 1 year (around 6/23/2023).

## 2022-06-23 NOTE — PROGRESS NOTES
Identified pt with two pt identifiers(name and ). Reviewed record in preparation for visit and have obtained necessary documentation. Chief Complaint   Patient presents with    New Patient      Vitals:    22 1430   BP: 90/65   Pulse: 63   Resp: 19   Temp: 98 °F (36.7 °C)   TempSrc: Oral   SpO2: 98%   Weight: 144 lb (65.3 kg)   Height: 5' 5\" (1.651 m)   PainSc:   0 - No pain   LMP: 2009       Allergies   Allergen Reactions    Effexor [Venlafaxine] Other (comments)     jittery       Current Outpatient Medications   Medication Instructions    amLODIPine (NORVASC) 5 mg, Oral, DAILY    busPIRone (BUSPAR) 5 mg tablet TAKE ONE TABLET BY MOUTH TWICE A DAY    pravastatin (PRAVACHOL) 40 mg tablet TAKE ONE TABLET BY MOUTH NIGHTLY    propranolol LA (INDERAL LA) 80 mg, Oral, DAILY    sertraline (ZOLOFT) 50 mg, Oral, DAILY       Health Maintenance Review: Patient reminded of \"due or due soon\" health maintenance. I have asked the patient to contact his/her primary care provider (PCP) for follow-up on his/her health maintenance. Immunization History   Administered Date(s) Administered    COVID-19, Pfizer Purple top, DILUTE for use, 12+ yrs, 30mcg/0.3mL dose 03/15/2021, 04/15/2021    Tdap 2015           Coordination of Care Questionnaire:  :   1) Have you been to an emergency room, urgent care, or hospitalized since your last visit? If yes, where when, and reason for visit? no       2. Have seen or consulted any other health care provider since your last visit? If yes, where when, and reason for visit? NO      Patient is accompanied by self I have received verbal consent from Ira Meza to discuss any/all medical information while they are present in the room.

## 2022-06-24 LAB — TSH SERPL DL<=0.005 MIU/L-ACNC: 3.43 UIU/ML (ref 0.45–4.5)

## 2022-07-01 DIAGNOSIS — F41.9 ANXIETY: ICD-10-CM

## 2022-07-04 RX ORDER — BUSPIRONE HYDROCHLORIDE 5 MG/1
TABLET ORAL
Qty: 180 TABLET | Refills: 3 | Status: SHIPPED | OUTPATIENT
Start: 2022-07-04

## 2022-07-08 ENCOUNTER — HOSPITAL ENCOUNTER (OUTPATIENT)
Dept: ULTRASOUND IMAGING | Age: 67
Discharge: HOME | End: 2022-07-08
Attending: INTERNAL MEDICINE
Payer: MEDICARE

## 2022-07-08 DIAGNOSIS — E04.1 THYROID NODULE: ICD-10-CM

## 2022-07-08 PROCEDURE — 76536 US EXAM OF HEAD AND NECK: CPT

## 2022-07-20 ENCOUNTER — TELEPHONE (OUTPATIENT)
Dept: FAMILY MEDICINE CLINIC | Age: 67
End: 2022-07-20

## 2022-07-20 ENCOUNTER — HOSPITAL ENCOUNTER (EMERGENCY)
Age: 67
Discharge: HOME | End: 2022-07-20
Attending: EMERGENCY MEDICINE
Payer: MEDICARE

## 2022-07-20 ENCOUNTER — APPOINTMENT (OUTPATIENT)
Dept: CT IMAGING | Age: 67
End: 2022-07-20
Attending: EMERGENCY MEDICINE
Payer: MEDICARE

## 2022-07-20 ENCOUNTER — NURSE TRIAGE (OUTPATIENT)
Dept: OTHER | Facility: CLINIC | Age: 67
End: 2022-07-20

## 2022-07-20 VITALS
RESPIRATION RATE: 18 BRPM | BODY MASS INDEX: 23.32 KG/M2 | WEIGHT: 140 LBS | SYSTOLIC BLOOD PRESSURE: 126 MMHG | HEIGHT: 65 IN | TEMPERATURE: 98.1 F | DIASTOLIC BLOOD PRESSURE: 83 MMHG | OXYGEN SATURATION: 96 % | HEART RATE: 66 BPM
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
## 2022-07-20 DIAGNOSIS — G93.89 MASS OF PINEAL REGION: Primary | ICD-10-CM

## 2022-07-20 PROCEDURE — 74011250637 HC RX REV CODE- 250/637: Performed by: EMERGENCY MEDICINE

## 2022-07-20 PROCEDURE — 70450 CT HEAD/BRAIN W/O DYE: CPT

## 2022-07-20 PROCEDURE — 90715 TDAP VACCINE 7 YRS/> IM: CPT | Performed by: EMERGENCY MEDICINE

## 2022-07-20 PROCEDURE — 72125 CT NECK SPINE W/O DYE: CPT

## 2022-07-20 PROCEDURE — 90471 IMMUNIZATION ADMIN: CPT

## 2022-07-20 PROCEDURE — 99284 EMERGENCY DEPT VISIT MOD MDM: CPT

## 2022-07-20 PROCEDURE — 74011250636 HC RX REV CODE- 250/636: Performed by: EMERGENCY MEDICINE

## 2022-07-20 RX ORDER — ONDANSETRON 4 MG/1
8 TABLET, ORALLY DISINTEGRATING ORAL
Status: COMPLETED | OUTPATIENT
Start: 2022-07-20 | End: 2022-07-20

## 2022-07-20 RX ORDER — ACETAMINOPHEN 500 MG
1000 TABLET ORAL ONCE
Status: COMPLETED | OUTPATIENT
Start: 2022-07-20 | End: 2022-07-20

## 2022-07-20 RX ADMIN — ACETAMINOPHEN 1000 MG: 500 TABLET ORAL at 11:29

## 2022-07-20 RX ADMIN — ONDANSETRON 8 MG: 4 TABLET, ORALLY DISINTEGRATING ORAL at 11:28

## 2022-07-20 RX ADMIN — TETANUS TOXOID, REDUCED DIPHTHERIA TOXOID AND ACELLULAR PERTUSSIS VACCINE, ADSORBED 0.5 ML: 5; 2.5; 8; 8; 2.5 SUSPENSION INTRAMUSCULAR at 11:29

## 2022-07-20 NOTE — ED TRIAGE NOTES
Patient presents to the ED with c/o fall yesterday where she hit her head on the corner of the vanity. Pt reports tripping on the rug. Pt reports EMS came out but she did not go anywhere to be checked out. Pt still has dried blood in her hair, no active bleeding noted in triage.

## 2022-07-20 NOTE — ED NOTES
Patient (s) given copy of dc instructions. Patient (s) verbalized understanding of instructions and script (s). Patient given a current medication reconciliation form and verbalized understanding of their medications. Patient (s) verbalized understanding of the importance of discussing medications with  his or her physician or clinic they will be following up with. Patient alert and oriented and in no acute distress. Patient discharged home ambulatory.

## 2022-07-20 NOTE — TELEPHONE ENCOUNTER
Received call from [address] [facility] at [facility] with Red Flag Complaint. Subjective: Caller states \"Fell yesterday in bathroom and hit her head\"     Current Symptoms: Genet Graham yesterday morning tripping over bathroom rug, hit left side of head on the corner of vanity. Pt states there was a lot of blood, EMS came and cleaned up the laceration, did not require stitches    Currently knot on head is apricot sized, pt reports nausea. No re-bleeding. Mild headache  Denies loss of conciousness  Denies dizziness    Denies changes in vision or speech. Onset: 1030 yesterday morning    Associated Symptoms:  activity is slower, drinking water, reduced appetite. Pain Severity: 5/10, aching pain    Temperature: Denies    What has been tried: None    Recommended disposition: See in Office Today    Care advice provided, patient verbalizes understanding; denies any other questions or concerns; instructed to call back for any new or worsening symptoms. Patient/Caller agrees with recommended disposition; writer provided warm transfer to DIXON Vargas at [facility] for appointment scheduling    Attention Provider: Thank you for allowing me to participate in the care of your patient. The patient was connected to triage in response to information provided to the [facility]. Please do not respond through this encounter as the response is not directed to a shared pool. Reason for Disposition   Patient wants to be seen   Patient wants to be seen    Protocols used:  Falls and Falling-ADULT-[place], Head Injury-ADULT-[place]

## 2022-07-20 NOTE — ED NOTES
Emergency Department Nursing Plan of Care       The Nursing Plan of Care is developed from the Nursing assessment and Emergency Department Attending provider initial evaluation. The plan of care may be reviewed in the ED Provider note.     The Plan of Care was developed with the following considerations:   Patient / Family readiness to learn indicated by:verbalized understanding  Persons(s) to be included in education: patient  Barriers to Learning/Limitations:No    Signed     Joseline Barnard RN    7/20/2022   11:35 AM

## 2022-07-20 NOTE — TELEPHONE ENCOUNTER
Pt transferred from [facility] ([facility]). Pt fell, hit head, and now has a laceration, headache and nausea. Pt was advised to go to ER for evaluation and treatment and agrees with plan.

## 2022-07-20 NOTE — ED PROVIDER NOTES
EMERGENCY DEPARTMENT HISTORY AND PHYSICAL EXAM      Date: 7/20/2022  Patient Name: Saloni Luther  Patient Age and Sex: 73 y.o. female     History of Presenting Illness     Chief Complaint   Patient presents with    Fall       History Provided By: Patient    HPI: Saloni Luther is a 71-year-old history hypercholesterolemia, not on blood thinners, no aspirin presenting with ground-level fall. He states he tripped last night striking the left side of her head on her vanity in the bathroom. She is unsure if she lost consciousness. Bleeding at the time which is now controlled. Mild ongoing headache, nausea without vomiting. No seizures, remembers all events surrounding the fall. No preceding symptoms prior to fall including no dizziness palpitations chest pain dyspnea. There are no other complaints, changes, or physical findings at this time. PCP: Yobany Starks MD    No current facility-administered medications on file prior to encounter. Current Outpatient Medications on File Prior to Encounter   Medication Sig Dispense Refill    busPIRone (BUSPAR) 5 mg tablet TAKE ONE TABLET BY MOUTH TWICE A  Tablet 3    amLODIPine (NORVASC) 5 mg tablet Take 1 Tablet by mouth daily. 90 Tablet 3    propranolol LA (INDERAL LA) 80 mg SR capsule Take 1 Capsule by mouth daily. 90 Capsule 0    sertraline (Zoloft) 50 mg tablet Take 1 Tablet by mouth daily.  30 Tablet 0    pravastatin (PRAVACHOL) 40 mg tablet TAKE ONE TABLET BY MOUTH NIGHTLY 90 Tab 2       Past History     Past Medical History:  Past Medical History:   Diagnosis Date    Allergic conjunctivitis of both eyes 10/19/2015    Depression 9/11/2014    Essential hypertension 12/15/2017    Hot flashes 6/23/2014    Hot flashes     Hypercholesterolemia     Osteoarthritis of right knee 3/11/2014    Osteoarthritis of right knee 3/11/2014       Past Surgical History:  Past Surgical History:   Procedure Laterality Date    ENDOSCOPY, COLON, DIAGNOSTIC      2 /10 normal       Family History:  Family History   Problem Relation Age of Onset    Elevated Lipids Mother     Elevated Lipids Father     Hypertension Father     Elevated Lipids Sister        Social History:  Social History     Tobacco Use    Smoking status: Former     Types: Cigarettes    Smokeless tobacco: Never    Tobacco comments:     for only a couple of years in her late teens   Vaping Use    Vaping Use: Never used   Substance Use Topics    Alcohol use: Yes     Comment: 2 glasses of wine, 2-3 x a week    Drug use: No       Allergies: Allergies   Allergen Reactions    Effexor [Venlafaxine] Other (comments)     jittery       Review of Systems   Review of Systems   Constitutional:  Negative for chills and fever. HENT:  Negative for congestion and rhinorrhea. Respiratory:  Negative for shortness of breath. Cardiovascular:  Negative for chest pain. Gastrointestinal:  Positive for nausea. Negative for abdominal pain and vomiting. Genitourinary:  Negative for dysuria and frequency. Musculoskeletal:  Negative for myalgias. Neurological:  Positive for headaches. All other systems reviewed and are negative. Physical Exam   Physical Exam  Vitals and nursing note reviewed. Constitutional:       General: She is not in acute distress. Appearance: Normal appearance. She is not ill-appearing. HENT:      Head: Normocephalic. Comments: 3 inch superficial abrasion to left parietal scalp with soft tissue swelling, no palpable skull fracture, dried blood in hair     Mouth/Throat:      Mouth: Mucous membranes are moist.   Eyes:      General: No visual field deficit. Conjunctiva/sclera: Conjunctivae normal.   Cardiovascular:      Rate and Rhythm: Normal rate and regular rhythm. Pulses: Normal pulses. Pulmonary:      Effort: Pulmonary effort is normal.      Breath sounds: Normal breath sounds. Abdominal:      General: Abdomen is flat. Palpations: Abdomen is soft. Musculoskeletal:         General: No deformity. Comments: Mild midline cervical spine tenderness to palpation, worse tenderness to palpation of paraspinal muscles   Skin:     General: Skin is warm and dry. Neurological:      Mental Status: She is alert and oriented to person, place, and time. Mental status is at baseline. GCS: GCS eye subscore is 4. GCS verbal subscore is 5. GCS motor subscore is 6. Cranial Nerves: Cranial nerves are intact. No cranial nerve deficit, dysarthria or facial asymmetry. Sensory: Sensation is intact. No sensory deficit. Motor: Motor function is intact. No weakness. Coordination: Coordination is intact. Finger-Nose-Finger Test and Heel to [facility] Test normal.   Psychiatric:         Behavior: Behavior normal.         Thought Content: Thought content normal.        Diagnostic Study Results   Labs   No results found for this or any previous visit (from the past 12 hour(s)). Radiologic Studies  CT HEAD WO CONT   Final Result   1. A 2.0 x 1.3 cm pineal region mass is incompletely characterized. Recommend   MRI brain without and with contrast for further evaluation. 2. No evidence of acute infarct or hemorrhage. CT SPINE CERV WO CONT   Final Result   1. No evidence of acute fracture. Stable mild chronic compression fracture of   T1.   2. Redemonstrated pineal region mass. Recommend MRI brain without and with   contrast for further evaluation. CT Results  (Last 48 hours)                 07/20/22 1159  CT HEAD WO CONT Final result    Impression:  1. A 2.0 x 1.3 cm pineal region mass is incompletely characterized. Recommend   MRI brain without and with contrast for further evaluation. 2. No evidence of acute infarct or hemorrhage. Narrative:  EXAM:  CT HEAD WO CONT       INDICATION:   fall       COMPARISON: None. TECHNIQUE: Unenhanced CT of the head was performed using 5 mm images. Brain and   bone windows were generated.   CT dose reduction was achieved through use of a   standardized protocol tailored for this examination and automatic exposure   control for dose modulation. FINDINGS:   The ventricles are normal in size and position. Basilar cisterns are patent. No   midline shift. There is no evidence of acute infarct or hemorrhage. There is a   2.0 x 1.3 cm pineal region mass. The paranasal sinuses, mastoid air cells, and middle ears are clear. The orbital   contents are within normal limits. There are no significant osseous or   extracranial soft tissue lesions. 07/20/22 1159  CT SPINE CERV WO CONT Final result    Impression:  1. No evidence of acute fracture. Stable mild chronic compression fracture of   T1.   2. Redemonstrated pineal region mass. Recommend MRI brain without and with   contrast for further evaluation. Narrative:  EXAM:  CT SPINE CERV WO CONT       INDICATION:  fall       COMPARISON: CT chest 4/8/2022. TECHNIQUE:   Unenhanced multislice helical CT of the cervical spine was   performed in the axial plane. Coronal and sagittal reconstructions were   obtained. CT dose reduction was achieved through use of a standardized protocol   tailored for this examination and automatic exposure control for dose   modulation. FINDINGS:       Stable mild chronic compression fracture of T1. Remaining vertebral body heights   are preserved without evidence of acute fracture. Alignment is within normal   limits. Multilevel degenerative disc disease most advanced at C5-C6 and C6-C7. There is mild spinal canal stenosis at C6-C7. Redemonstrated pineal region mass. Visualized soft tissues of the neck are unremarkable. Visualized lung apices are   clear. CXR Results  (Last 48 hours)      None              Medical Decision Making   I am the first provider for this patient.     I reviewed the vital signs, available nursing notes, past medical history, past surgical history, family history and social history. Vital Signs-Reviewed the patient's vital signs. Patient Vitals for the past 12 hrs:   Temp Pulse Resp BP SpO2   07/20/22 1104 98.1 °F (36.7 °C) 66 18 126/83 96 %       Records Reviewed: Nursing Notes and Old Medical Records    Provider Notes (Medical Decision Making):   DDx ICH, cervical spine fracture     Will obtain CT head CT C-spine. Mechanical fall without any preceding symptoms. Will update tetanus. Give Tylenol and Zofran for symptomatic control. ED Course:   Initial assessment performed. The patients presenting problems have been discussed, and they are in agreement with the care plan formulated and outlined with them. I have encouraged them to ask questions as they arise throughout their visit. ED Course as of 07/20/22 1324   Wed Jul 20, 2022   1223 CT shows 2 x 1 cm pineal mass, no ICH or stroke [WB]   1234 CT shows no acute fracture, stable mild chronic compression fracture at T1 [WB]      ED Course User Index  [WB] Homa Villalpando MD     Critical Care Time:   0    Disposition:  Discharge Note:  The patient has been re-evaluated and is ready for discharge. Reviewed available results with patient. Counseled patient on diagnosis and care plan. Patient has expressed understanding, and all questions have been answered. Patient agrees with plan and agrees to follow up as recommended, or to return to the ED if their symptoms worsen. Discharge instructions have been provided and explained to the patient, along with reasons to return to the ED. PLAN:  Current Discharge Medication List        2.    Follow-up Information       Follow up With Specialties Details Why Contact Info    Reddy Caro MD Family Medicine Schedule an appointment as soon as possible for a visit  For outpatient MRI with and without contrast to evaluate mass in pineal region [address] [facility]      Carissa Osei MD Neurosurgery Schedule an appointment as soon as possible for a visit   [address] [id number]  [phone]            3. Return to ED if worse     Diagnosis     Clinical Impression:   1. Mass of pineal region    2. Fall from ground level        Attestations:    Lana Buaer M.D. Please note that this dictation was completed with [employer], the computer voice recognition software. Quite often unanticipated grammatical, syntax, homophones, and other interpretive errors are inadvertently transcribed by the computer software. Please disregard these errors. Please excuse any errors that have escaped final proofreading. Thank you.

## 2022-07-20 NOTE — DISCHARGE INSTRUCTIONS
Your imaging today demonstrated an incidental 2 x 1 cm mass in the pineal gland. I recommend you follow-up with your primary care physician to discuss outpatient MRI of the brain with and without contrast to further characterize. Please follow-up also with neurosurgery for next steps.

## 2022-07-27 ENCOUNTER — OFFICE VISIT (OUTPATIENT)
Dept: FAMILY MEDICINE CLINIC | Age: 67
End: 2022-07-27
Payer: MEDICARE

## 2022-07-27 ENCOUNTER — TELEPHONE (OUTPATIENT)
Dept: ENDOCRINOLOGY | Age: 67
End: 2022-07-27

## 2022-07-27 VITALS
OXYGEN SATURATION: 95 % | HEIGHT: 65 IN | HEART RATE: 57 BPM | SYSTOLIC BLOOD PRESSURE: 104 MMHG | BODY MASS INDEX: 23.32 KG/M2 | TEMPERATURE: 97.4 F | WEIGHT: 140 LBS | DIASTOLIC BLOOD PRESSURE: 76 MMHG

## 2022-07-27 DIAGNOSIS — D49.7 PINEAL TUMOR: Primary | ICD-10-CM

## 2022-07-27 DIAGNOSIS — M62.81 WEAKNESS OF TRUNK MUSCULATURE: ICD-10-CM

## 2022-07-27 DIAGNOSIS — R26.9 ABNORMAL GAIT: ICD-10-CM

## 2022-07-27 DIAGNOSIS — R00.1 BRADYCARDIA: ICD-10-CM

## 2022-07-27 DIAGNOSIS — G25.0 ESSENTIAL TREMOR: ICD-10-CM

## 2022-07-27 DIAGNOSIS — R29.6 FALLS FREQUENTLY: ICD-10-CM

## 2022-07-27 PROCEDURE — G8399 PT W/DXA RESULTS DOCUMENT: HCPCS | Performed by: FAMILY MEDICINE

## 2022-07-27 PROCEDURE — G8754 DIAS BP LESS 90: HCPCS | Performed by: FAMILY MEDICINE

## 2022-07-27 PROCEDURE — G8420 CALC BMI NORM PARAMETERS: HCPCS | Performed by: FAMILY MEDICINE

## 2022-07-27 PROCEDURE — G8536 NO DOC ELDER MAL SCRN: HCPCS | Performed by: FAMILY MEDICINE

## 2022-07-27 PROCEDURE — 3017F COLORECTAL CA SCREEN DOC REV: CPT | Performed by: FAMILY MEDICINE

## 2022-07-27 PROCEDURE — 99214 OFFICE O/P EST MOD 30 MIN: CPT | Performed by: FAMILY MEDICINE

## 2022-07-27 PROCEDURE — 1123F ACP DISCUSS/DSCN MKR DOCD: CPT | Performed by: FAMILY MEDICINE

## 2022-07-27 PROCEDURE — G9717 DOC PT DX DEP/BP F/U NT REQ: HCPCS | Performed by: FAMILY MEDICINE

## 2022-07-27 PROCEDURE — G8427 DOCREV CUR MEDS BY ELIG CLIN: HCPCS | Performed by: FAMILY MEDICINE

## 2022-07-27 PROCEDURE — G9899 SCRN MAM PERF RSLTS DOC: HCPCS | Performed by: FAMILY MEDICINE

## 2022-07-27 PROCEDURE — G8752 SYS BP LESS 140: HCPCS | Performed by: FAMILY MEDICINE

## 2022-07-27 PROCEDURE — 1090F PRES/ABSN URINE INCON ASSESS: CPT | Performed by: FAMILY MEDICINE

## 2022-07-27 PROCEDURE — 1101F PT FALLS ASSESS-DOCD LE1/YR: CPT | Performed by: FAMILY MEDICINE

## 2022-07-27 RX ORDER — CHOLECALCIFEROL (VITAMIN D3) 125 MCG
CAPSULE ORAL
COMMUNITY
End: 2022-09-14 | Stop reason: DRUGHIGH

## 2022-07-27 RX ORDER — PROPRANOLOL HYDROCHLORIDE 60 MG/1
60 CAPSULE, EXTENDED RELEASE ORAL DAILY
Qty: 90 CAPSULE | Refills: 0 | Status: SHIPPED
Start: 2022-07-27 | End: 2022-09-20

## 2022-07-27 RX ORDER — IBUPROFEN 200 MG
400 TABLET ORAL
COMMUNITY
End: 2022-09-14 | Stop reason: DRUGHIGH

## 2022-07-27 RX ORDER — BISMUTH SUBSALICYLATE 262 MG
1 TABLET,CHEWABLE ORAL DAILY
COMMUNITY

## 2022-07-27 NOTE — TELEPHONE ENCOUNTER
I returned this call and relayed the [employer] message from Dr. Jenny Gould. She understood this information and said she would do as instructed.  I also asked her to go in and read the message  Marina Cooney

## 2022-07-27 NOTE — PROGRESS NOTES
Identified pt with two pt identifiers(name and ). Reviewed record in preparation for visit and have obtained necessary documentation. Chief Complaint   Patient presents with    Follow-up     Pt was seen in the ER for a fall on 22. Pt states she was told she had a brain tumor on a CT scan and needed a MRI. Tremors     Pt states tremors has gotten better. Medication Refill     Pravastatin        Vitals:    22 1103   BP: 104/76   Pulse: (!) 57   Temp: 97.4 °F (36.3 °C)   TempSrc: Temporal   SpO2: 95%   Weight: 140 lb (63.5 kg)   Height: 5' 5\" (1.651 m)   PainSc:   8   PainLoc: Back   LMP: 2009       Health Maintenance Due   Topic    Shingrix Vaccine Age 50> (1 of 2)    Colorectal Cancer Screening Combo     Cervical cancer screen     COVID-19 Vaccine (3 - Booster for [employer] series)    Pneumococcal 65+ years (1 - PCV)    Medicare Yearly Exam        Coordination of Care Questionnaire:  :   1) Have you been to an emergency room, urgent care, or hospitalized since your last visit? If yes, where when, and reason for visit? Yes seen in the ER for a fall on 22. 2. Have seen or consulted any other health care provider since your last visit? If yes, where when, and reason for visit? No      Patient is accompanied by daughter I have received verbal consent from Julia Camarillo to discuss any/all medical information while they are present in the room.

## 2022-07-27 NOTE — PROGRESS NOTES
HISTORY OF PRESENT ILLNESS  Aristides Hamilton is a 73 y.o. female. Aristides Hamilton is here to follow up on her essential tremor. She started Inderal and it is working really well, however, her blood pressure was in the 90's the other day. Her daughter is with her today and mentions that she has been falling. Evidently, she fell last week and hit her head. The CT done in the ER showed a pineal tumor and told her to get an MRI and they have an appointment with neurosurgery at the beginning of next month. The patient states that she is falling because when she bends over, she cannot sit up due to truncal weakness. Review of Systems   Eyes:  Negative for blurred vision. Respiratory:  Negative for shortness of breath. Cardiovascular:  Negative for chest pain. Neurological:  Negative for dizziness, sensory change, speech change, focal weakness and headaches. Visit Vitals  /76 (BP 1 Location: Left upper arm, BP Patient Position: Sitting, BP Cuff Size: Adult)   Pulse (!) 57   Temp 97.4 °F (36.3 °C) (Temporal)   Ht 5' 5\" (1.651 m)   Wt 140 lb (63.5 kg)   LMP 01/01/2009   SpO2 95%   BMI 23.30 kg/m²     Physical Exam  Neurological:      Motor: Abnormal muscle tone present. Deep Tendon Reflexes: Reflexes are normal and symmetric. Babinski sign absent on the right side. Babinski sign absent on the left side. Comments: Facies symmetric. She is unable to do a sit up. Mild tremor. ASSESSMENT and PLAN    ICD-10-CM ICD-9-CM    1. Pineal tumor  D49.7 239.7 REFERRAL TO NEUROLOGY      MRI BRAIN W WO CONT      REFERRAL TO PHYSICAL THERAPY      2. Weakness of trunk musculature  M62.81 728.87 REFERRAL TO NEUROLOGY      REFERRAL TO PHYSICAL THERAPY      3. Falls frequently  R29.6 V15.88 REFERRAL TO NEUROLOGY      REFERRAL TO PHYSICAL THERAPY      4.  Abnormal gait  R26.9 781.2 REFERRAL TO NEUROLOGY      REFERRAL TO PHYSICAL THERAPY      5. Essential tremor  G25.0 333.1 propranolol LA (INDERAL LA) 60 mg SR capsule      REFERRAL TO NEUROLOGY      6. Bradycardia  R00.1 427.89           Newly diagnosed pineal tumor  Undiagnosed cluster of neuromuscular symptoms  Tremor better but now with bradycardia  MRI  Follow up with neurosurgery as she is doing  Decrease Inderal dose  Neurology referral  PT    Follow-up and Dispositions    Return if symptoms worsen or fail to improve. Reviewed plan of care. Patient has provided input and agrees with goals.

## 2022-07-27 NOTE — TELEPHONE ENCOUNTER
Pt called 7/27 @ 2:31 PM.    Pt stated dr Praneeth Patton had her get some imaging done for her thyroid. She would like to know if the results are in and if so if she could get a call to go over the information.     Pt# [phone]

## 2022-07-28 DIAGNOSIS — F33.0 DEPRESSION, MAJOR, RECURRENT, MILD (HCC): ICD-10-CM

## 2022-07-28 DIAGNOSIS — E78.00 HYPERCHOLESTEROLEMIA: ICD-10-CM

## 2022-07-28 DIAGNOSIS — F41.9 ANXIETY: ICD-10-CM

## 2022-08-02 RX ORDER — PRAVASTATIN SODIUM 40 MG/1
TABLET ORAL
Qty: 90 TABLET | Refills: 2 | Status: SHIPPED | OUTPATIENT
Start: 2022-08-02

## 2022-08-02 RX ORDER — SERTRALINE HYDROCHLORIDE 100 MG/1
TABLET, FILM COATED ORAL
Qty: 90 TABLET | Refills: 1 | OUTPATIENT
Start: 2022-08-02

## 2022-08-04 ENCOUNTER — HOSPITAL ENCOUNTER (OUTPATIENT)
Dept: MRI IMAGING | Age: 67
Discharge: HOME | End: 2022-08-04
Attending: FAMILY MEDICINE
Payer: MEDICARE

## 2022-08-04 VITALS — WEIGHT: 140 LBS | BODY MASS INDEX: 23.3 KG/M2

## 2022-08-04 DIAGNOSIS — D49.7 PINEAL TUMOR: ICD-10-CM

## 2022-08-04 PROCEDURE — A9576 INJ PROHANCE MULTIPACK: HCPCS | Performed by: RADIOLOGY

## 2022-08-04 PROCEDURE — 70553 MRI BRAIN STEM W/O & W/DYE: CPT

## 2022-08-04 PROCEDURE — 74011250636 HC RX REV CODE- 250/636: Performed by: RADIOLOGY

## 2022-08-04 RX ADMIN — GADOTERIDOL 12 ML: 279.3 INJECTION, SOLUTION INTRAVENOUS at 18:54

## 2022-08-05 ENCOUNTER — TRANSCRIBE ORDER (OUTPATIENT)
Dept: SCHEDULING | Age: 67
End: 2022-08-05

## 2022-08-05 DIAGNOSIS — D49.7: Primary | ICD-10-CM

## 2022-08-05 DIAGNOSIS — G95.9 MYELOPATHY (HCC): Primary | ICD-10-CM

## 2022-08-08 ENCOUNTER — TELEPHONE (OUTPATIENT)
Dept: FAMILY MEDICINE CLINIC | Age: 67
End: 2022-08-08

## 2022-08-08 NOTE — TELEPHONE ENCOUNTER
Pt called regarding severe back pain she's having, noting pain scale at 9 out of 10, lower back on right side, denies urinary or bowel changes, or radiation down leg, but can hardly get out of bed. Pt seen at [facility] after fall 7/20/22, followed up with Dr. Carol Gary 7/27/22 and has MRI ordered, but requests call back from nurse at [phone]. Please advise.  Tyrell

## 2022-08-09 ENCOUNTER — TELEPHONE (OUTPATIENT)
Dept: FAMILY MEDICINE CLINIC | Age: 67
End: 2022-08-09

## 2022-08-10 ENCOUNTER — HOSPITAL ENCOUNTER (OUTPATIENT)
Dept: PHYSICAL THERAPY | Age: 67
Discharge: HOME | End: 2022-08-10
Payer: MEDICARE

## 2022-08-10 PROCEDURE — 97014 ELECTRIC STIMULATION THERAPY: CPT | Performed by: PHYSICAL THERAPIST

## 2022-08-10 PROCEDURE — 97110 THERAPEUTIC EXERCISES: CPT | Performed by: PHYSICAL THERAPIST

## 2022-08-10 PROCEDURE — 97161 PT EVAL LOW COMPLEX 20 MIN: CPT | Performed by: PHYSICAL THERAPIST

## 2022-08-10 NOTE — TELEPHONE ENCOUNTER
Please call patient and let her know, as expected, her MRI shows a pineal mass. Has she seen neurosurgery yet?

## 2022-08-10 NOTE — PROGRESS NOTES
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 She initially fell and hit her head on 22, and she went to the ED at the time. Ct scan revealed a pineal tumor, and was referred to Neurology. She didn't feel that the pineal tumor was causing her gait and balance deficits. She has had several falls in the last few months, usually falling forward, particularly after leaning forward. Also reports her knees are buckling and has caused some of her falls. Balance issues have been going on \"for a while. \" No known mechanism of onset. She has been doing better since having acupuncture done a few days ago. Patient is recently retired. Was working for [employer] but quit after her most recent fall/injury.      Description of symptoms: R>Lsided low back pain  Aggravating Factors: getting out of bed in the morning, frequent position changes  Alleviating Factors: Tylenol    Radiation: none reported    Patient reports functional limitations with: walking, gardening, cooking meals, ironing    OBJECTIVE/EXAMINATION  Posture: forward trunk lean in standing  Other Observations: grabbing onto R side as she is standing/ambulating  Functional Mobility: slow and painful transfers  Palpation: TTP over R>L lumbar paraspinals and in particularly R QL        Lumbar AROM:          R  L    Flexion    WFLs, but painful    Extension   50% loss, no pain      Side Bending   50% limited bilaterally, pain          LOWER QUARTER   MUSCLE STRENGTH  KEY       R  L  0 - No Contraction  L1, L2 Psoas  4  4  1 - Trace   L3 Quads  4+  4+  2 - Poor   L4 Tib Ant  4+  4+  3 - Fair    L5 EHL  --  --  4 - Good   S1 Peroneals  5  5  5 - Normal   S2 Hams  4  4    Mobility Assessment: lumbar P-A mobility: hypomobile generally with sidelying assessment, no pain     Lumbar rotational mobility: limited assessment due to pain/guarding with R rotation      Neurological: Reflexes / Sensations: deferred  Special Tests:     Forward Bend: (+) for low back pain       H.S. SLR: (-)  Slump: (-)           Modality rationale: decrease inflammation and decrease pain to improve the patients ability to move without pain   Min Type Additional Details   10 [x] Estim: []Att   [x]Unatt        []TENS instruct                  [x]IFC  []Premod   []NMES                     []Other:  []w/US   [x]w/ice   []w/heat  Position:  Location: back    []  Traction: [] Cervical       []Lumbar                       [] Prone          []Supine                       []Intermittent   []Continuous Lbs:  [] before manual  [] after manual  []w/heat    []  Ultrasound: []Continuous   [] Pulsed at:                           []1MHz   []3MHz Location:  W/cm2:    [] Paraffin         Location:   []w/heat    []  Ice     []  Heat  []  Ice massage Position:  Location:    []  Laser  []  Other: Position:  Location:      []  Vasopneumatic Device Pressure:       [] lo [] med [] hi   Temperature:      [x] Skin assessment post-treatment:  [x]intact []redness- no adverse reaction    []redness - adverse reaction:     10 min Therapeutic Exercise:  [x] See flow sheet :   Rationale: increase ROM and increase strength to improve the patients ability to move without pain          With   [] TE   [] TA   [] Neuro   [] SC   [] other: Patient Education: [x] Review HEP    [] Progressed/Changed HEP based on:   [] positioning   [] body mechanics   [] transfers   [] heat/ice application    [] other:      Other Objective/Functional Measures: FOTO Functional Measure: 33/100    Pain Level (0-10 scale) post treatment: 4    ASSESSMENT/Changes in Function:     [x]  See Plan of LEO Mays, DPT 8/10/2022

## 2022-08-10 NOTE — THERAPY EVALUATION
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 Assessment/ shelton information: Ms. Tangela Hua reports to PT with recently worsening balance and frequent falls. Recently wend to the ED secondary to falling and hitting her head. She reports she falls forward and is mainly associated with bending towards the ground. Also reporting LE weakness and 'buckling'. Was not able to thoroughly assess her balance function today as she came in with severe back pain stemming from her recent fall. Symptoms consistent with a lumbar strain and is likely exacerbated by a lack of core/trunk stability and strength. Symptoms concentrated in the R quadratus muscle, and will focus initially on reducing back pain so we can transition to working on her balance deficits.      Evaluation Complexity History HIGH Complexity :3+ comorbidities / personal factors will impact the outcome/ POC ; Examination HIGH Complexity : 4+ Standardized tests and measures addressing body structure, function, activity limitation and / or participation in recreation  ;Presentation LOW Complexity : Stable, uncomplicated  ;Clinical Decision Making MEDIUM Complexity : FOTO score of 26-74  Overall Complexity Rating: LOW     Problem List: pain affecting function, decrease ROM, decrease strength, impaired gait/ balance, decrease ADL/ functional abilitiies, decrease activity tolerance, and decrease flexibility/ joint mobility   Treatment Plan may include any combination of the following: Therapeutic exercise, Therapeutic activities, Neuromuscular re-education, Physical agent/modality, Manual therapy, Patient education, and Self Care training  Patient / Family readiness to learn indicated by: asking questions and interest  Persons(s) to be included in education: patient (P)  Barriers to Learning/Limitations: None  Patient Goal (s): less pain, better balance  Patient Self Reported Health Status: fair  Rehabilitation Potential: fair    Long Term Goals: To be accomplished in 12-16 treatments:   1. Pt will be able to get up out of bed without increase in pain   2. Pt will be able to garden without balance difficulties or pain   3. Pt will be able to stand to cook a meal without difficulty   4. Pt will be able to self-manage care using updated HEP for improved independence   5. Improved FOTO score to 60 or better to demonstrate improved function  Frequency / Duration: Patient to be seen 1-2 times per week for 12-16 treatments. Patient/ Caregiver education and instruction: self care and exercises    [x]  Plan of care has been reviewed with PTA      Certification Period: 8/10/22-11/10/22  Kermit Tillman PT, DPT 8/10/2022     ________________________________________________________________________    I certify that the above Therapy Services are being furnished while the patient is under my care. I agree with the treatment plan and certify that this therapy is necessary.     Physician's Signature:____________________ Date:____________Time: _________         Mary Carvalho MD

## 2022-08-15 ENCOUNTER — HOSPITAL ENCOUNTER (OUTPATIENT)
Dept: PHYSICAL THERAPY | Age: 67
Discharge: HOME | End: 2022-08-15
Payer: MEDICARE

## 2022-08-15 PROCEDURE — 97140 MANUAL THERAPY 1/> REGIONS: CPT | Performed by: PHYSICAL THERAPIST

## 2022-08-15 PROCEDURE — 97110 THERAPEUTIC EXERCISES: CPT | Performed by: PHYSICAL THERAPIST

## 2022-08-15 PROCEDURE — 97014 ELECTRIC STIMULATION THERAPY: CPT | Performed by: PHYSICAL THERAPIST

## 2022-08-15 NOTE — PROGRESS NOTES
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      OBJECTIVE         Modality rationale: decrease inflammation and decrease pain to improve the patients ability to move without pain    Min Type Additional Details   15 [x] Estim: []Att   [x]Unatt        []TENS instruct                  [x]IFC  []Premod   []NMES                     []Other: []w/US   [x]w/ice   []w/heat  Position:  Location: back     []  Traction: [] Cervical       []Lumbar                       [] Prone          []Supine                       []Intermittent   []Continuous Lbs:  [] before manual  [] after manual  []w/heat     []  Ultrasound: []Continuous   [] Pulsed at:                           []1MHz   []3MHz Location:  W/cm2:     [] Paraffin        Location:  []w/heat     []  Ice     []  Heat  []  Ice massage Position:  Location:     []  Laser  []  Other: Position:  Location:        []  Vasopneumatic Device Pressure:       [] lo [] med [] hi  Temperature:      [x] Skin assessment post-treatment:  [x]intact []redness- no adverse reaction    []redness - adverse reaction:       38 min Therapeutic Exercise:  [x] See flow sheet :   Rationale: increase ROM and increase strength to improve the patients ability to move without pain    10 min Manual Therapy: sidelying lumbar gapping stretch and gentle rotational stretching    Rationale: decrease pain, increase ROM, increase tissue extensibility, decrease trigger points, and increase postural awareness to improve the patients ability to return to gardening without pain    With   [] TE   [] TA   [] Neuro   [] SC   [] other: Patient Education: [x] Review HEP    [] Progressed/Changed HEP based on:   [] positioning   [] body mechanics   [] transfers   [] heat/ice application    [] other:      Other Objective/Functional Measures: --     Pain Level (0-10 scale) post treatment: 4    ASSESSMENT/Changes in Function:   Had some reduced pain during session from flare up gardening over the weekend. Will continue to try and build on this in the next few sessions. Patient will continue to benefit from skilled PT services to modify and progress therapeutic interventions, address functional mobility deficits, address ROM deficits, address strength deficits, analyze and address soft tissue restrictions, and analyze and cue movement patterns to attain remaining goals. []  See Plan of Care  []  See progress note/recertification  []  See Discharge Summary         Progress towards goals / Updated goals:  Long Term Goals:  To be accomplished in 12-16 treatments:              1. Pt will be able to get up out of bed without increase in pain              2. Pt will be able to garden without balance difficulties or pain NOT MET              3. Pt will be able to stand to cook a meal without difficulty              4. Pt will be able to self-manage care using updated HEP for improved independence              5. Improved FOTO score to 60 or better to demonstrate improved function    PLAN  [x]  Upgrade activities as tolerated     [x]  Continue plan of care  []  Update interventions per flow sheet       []  Discharge due to:_  []  Other:_      Ryan Dia PT, DPT 8/15/2022

## 2022-08-19 ENCOUNTER — HOSPITAL ENCOUNTER (OUTPATIENT)
Dept: MRI IMAGING | Age: 67
Discharge: HOME | End: 2022-08-19
Attending: NEUROLOGICAL SURGERY
Payer: MEDICARE

## 2022-08-19 DIAGNOSIS — G95.9 MYELOPATHY (HCC): ICD-10-CM

## 2022-08-19 PROCEDURE — 72141 MRI NECK SPINE W/O DYE: CPT

## 2022-08-19 PROCEDURE — 72146 MRI CHEST SPINE W/O DYE: CPT

## 2022-08-22 ENCOUNTER — APPOINTMENT (OUTPATIENT)
Dept: PHYSICAL THERAPY | Age: 67
End: 2022-08-22
Payer: MEDICARE

## 2022-08-24 ENCOUNTER — HOSPITAL ENCOUNTER (OUTPATIENT)
Dept: PHYSICAL THERAPY | Age: 67
Discharge: HOME | End: 2022-08-24
Payer: MEDICARE

## 2022-08-24 PROCEDURE — 97140 MANUAL THERAPY 1/> REGIONS: CPT | Performed by: PHYSICAL THERAPIST

## 2022-08-24 PROCEDURE — 97014 ELECTRIC STIMULATION THERAPY: CPT | Performed by: PHYSICAL THERAPIST

## 2022-08-24 PROCEDURE — 97110 THERAPEUTIC EXERCISES: CPT | Performed by: PHYSICAL THERAPIST

## 2022-08-24 NOTE — PROGRESS NOTES
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 She has been staying on a Motrin/Tyelenol regimen to keep the pain under control.      OBJECTIVE                Modality rationale: decrease inflammation and decrease pain to improve the patients ability to move without pain     Min Type Additional Details    15 [x] Estim: []Att   [x]Unatt        []TENS instruct                  [x]IFC  []Premod   []NMES                     []Other: []w/US   [x]w/ice   []w/heat  Position:  Location: back      []  Traction: [] Cervical       []Lumbar                       [] Prone          []Supine                       []Intermittent   []Continuous Lbs:  [] before manual  [] after manual  []w/heat      []  Ultrasound: []Continuous   [] Pulsed at:                           []1MHz   []3MHz Location:  W/cm2:      [] Paraffin        Location:  []w/heat      []  Ice     []  Heat  []  Ice massage Position:  Location:      []  Laser  []  Other: Position:  Location:         []  Vasopneumatic Device Pressure:       [] lo [] med [] hi  Temperature:       [x] Skin assessment post-treatment:  [x]intact []redness- no adverse reaction    []redness - adverse reaction:      27 min Therapeutic Exercise:  [x] See flow sheet :   Rationale: increase ROM and increase strength to improve the patients ability to move without pain     23 min Manual Therapy: sidelying lumbar gapping stretch and gentle rotational stretching, STM/MFR to R lumbar paraspinals   Rationale: decrease pain, increase ROM, increase tissue extensibility, decrease trigger points, and increase postural awareness to improve the patients ability to return to gardening without pain     With   [] TE   [] TA   [] Neuro   [] SC   [] other: Patient Education: [x] Review HEP    [] Progressed/Changed HEP based on:   [] positioning   [] body mechanics   [] transfers   [] heat/ice application    [] other:       Other Objective/Functional Measures: --        Pain Level (0-10 scale) post treatment: 3     ASSESSMENT/Changes in Function:   Still reporting high pain levels. Discussed having her speak with Neuro next week regarding this ongoing discomfort. Reduced pain with interventions today. Patient will continue to benefit from skilled PT services to modify and progress therapeutic interventions, address functional mobility deficits, address ROM deficits, address strength deficits, analyze and address soft tissue restrictions, and analyze and cue movement patterns to attain remaining goals. []  See Plan of Care  []  See progress note/recertification  []  See Discharge Summary         Progress towards goals / Updated goals:  Long Term Goals:  To be accomplished in 12-16 treatments:              1. Pt will be able to get up out of bed without increase in pain              2. Pt will be able to garden without balance difficulties or pain NOT MET              3. Pt will be able to stand to cook a meal without difficulty              4. Pt will be able to self-manage care using updated HEP for improved independence              5. Improved FOTO score to 60 or better to demonstrate improved function     PLAN  [x]  Upgrade activities as tolerated     [x]  Continue plan of care  []  Update interventions per flow sheet       []  Discharge due to:_  []  Other:_      Francisco Javier Carballo, PT, DPT 8/24/2022

## 2022-08-30 ENCOUNTER — HOSPITAL ENCOUNTER (OUTPATIENT)
Dept: PHYSICAL THERAPY | Age: 67
Discharge: HOME | End: 2022-08-30
Payer: MEDICARE

## 2022-08-30 PROCEDURE — 97140 MANUAL THERAPY 1/> REGIONS: CPT | Performed by: PHYSICAL THERAPIST

## 2022-08-30 PROCEDURE — 97014 ELECTRIC STIMULATION THERAPY: CPT | Performed by: PHYSICAL THERAPIST

## 2022-08-30 PROCEDURE — 97110 THERAPEUTIC EXERCISES: CPT | Performed by: PHYSICAL THERAPIST

## 2022-08-30 NOTE — PROGRESS NOTES
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  She is not noticing any significant changes thus far with PT.     OBJECTIVE               Modality rationale: decrease inflammation and decrease pain to improve the patients ability to move without pain      Min Type Additional Details     15 [x] Estim: []Att   [x]Unatt        []TENS instruct                  [x]IFC  []Premod   []NMES                     []Other: []w/US   [x]w/ice   []w/heat  Position:  Location: back       []  Traction: [] Cervical       []Lumbar                       [] Prone          []Supine                       []Intermittent   []Continuous Lbs:  [] before manual  [] after manual  []w/heat       []  Ultrasound: []Continuous   [] Pulsed at:                           []1MHz   []3MHz Location:  W/cm2:       [] Paraffin        Location:  []w/heat       []  Ice     []  Heat  []  Ice massage Position:  Location:       []  Laser  []  Other: Position:  Location:          []  Vasopneumatic Device Pressure:       [] lo [] med [] hi  Temperature:        [x] Skin assessment post-treatment:  [x]intact []redness- no adverse reaction    []redness - adverse reaction:      30 min Therapeutic Exercise:  [x] See flow sheet :   Rationale: increase ROM and increase strength to improve the patients ability to move without pain     15 min Manual Therapy: sidelying lumbar gapping stretch and gentle rotational stretching, STM/MFR to R lumbar paraspinals   Rationale: decrease pain, increase ROM, increase tissue extensibility, decrease trigger points, and increase postural awareness to improve the patients ability to return to gardening without pain     With   [] TE   [] TA   [] Neuro   [] SC   [] other: Patient Education: [x] Review HEP    [] Progressed/Changed HEP based on:   [] positioning   [] body mechanics   [] transfers   [] heat/ice application    [] other:       Other Objective/Functional Measures: --        Pain Level (0-10 scale) post treatment: 3     ASSESSMENT/Changes in Function:   Worked more on stabilization/hip strengthening today and she seemed to respond well to it. Will continue to try and work on this as well as she can apply it more with an HEP. Patient will continue to benefit from skilled PT services to modify and progress therapeutic interventions, address functional mobility deficits, address ROM deficits, address strength deficits, analyze and address soft tissue restrictions, and analyze and cue movement patterns to attain remaining goals. []  See Plan of Care  []  See progress note/recertification  []  See Discharge Summary         Progress towards goals / Updated goals:  Long Term Goals:  To be accomplished in 12-16 treatments:              1. Pt will be able to get up out of bed without increase in pain              2. Pt will be able to garden without balance difficulties or pain NOT MET              3. Pt will be able to stand to cook a meal without difficulty              4. Pt will be able to self-manage care using updated HEP for improved independence              5. Improved FOTO score to 60 or better to demonstrate improved function PLAN  [x]  Upgrade activities as tolerated     [x]  Continue plan of care  []  Update interventions per flow sheet       []  Discharge due to:_  []  Other:_      Ryan Dia PT, DPT 8/30/2022

## 2022-08-31 ENCOUNTER — TELEPHONE (OUTPATIENT)
Dept: FAMILY MEDICINE CLINIC | Age: 67
End: 2022-08-31

## 2022-08-31 NOTE — TELEPHONE ENCOUNTER
Patient had an MRI spine ordered by Dr. Lanny Mejia and the results came back normal. Dr. Lanny Mejia then suggested whatever issues the patient is having may be related to their kidneys and wanted the patient to schedule a kidney test. I informed the patient we would reach out to Dr. Guera shea and clinical staff for the best way to go about this and call back with any updates.

## 2022-09-01 ENCOUNTER — HOSPITAL ENCOUNTER (OUTPATIENT)
Dept: PHYSICAL THERAPY | Age: 67
Discharge: HOME | End: 2022-09-01
Payer: MEDICARE

## 2022-09-01 PROCEDURE — 97112 NEUROMUSCULAR REEDUCATION: CPT | Performed by: PHYSICAL THERAPIST

## 2022-09-01 PROCEDURE — 97110 THERAPEUTIC EXERCISES: CPT | Performed by: PHYSICAL THERAPIST

## 2022-09-01 PROCEDURE — 97014 ELECTRIC STIMULATION THERAPY: CPT | Performed by: PHYSICAL THERAPIST

## 2022-09-01 PROCEDURE — 97140 MANUAL THERAPY 1/> REGIONS: CPT | Performed by: PHYSICAL THERAPIST

## 2022-09-01 NOTE — PROGRESS NOTES
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 Saw the Neuro yesterday and she said her spine looked fine.  Wants to look at her kidneys due to ongoing pain and limited benefit with PT.     OBJECTIVE  Modality rationale: decrease inflammation and decrease pain to improve the patients ability to move without pain       Min Type Additional Details     15 [x] Estim: []Att   [x]Unatt        []TENS instruct                  [x]IFC  []Premod   []NMES                     []Other: []w/US   [x]w/ice   []w/heat  Position:  Location: back       []  Traction: [] Cervical       []Lumbar                       [] Prone          []Supine                       []Intermittent   []Continuous Lbs:  [] before manual  [] after manual  []w/heat       []  Ultrasound: []Continuous   [] Pulsed at:                           []1MHz   []3MHz Location:  W/cm2:       [] Paraffin        Location:  []w/heat       []  Ice     []  Heat  []  Ice massage Position:  Location:       []  Laser  []  Other: Position:  Location:          []  Vasopneumatic Device Pressure:       [] lo [] med [] hi  Temperature:        [x] Skin assessment post-treatment:  [x]intact []redness- no adverse reaction    []redness - adverse reaction:      30 min Therapeutic Exercise:  [x] See flow sheet :   Rationale: increase ROM and increase strength to improve the patients ability to move without pain     10 min Neuromuscular Re-education:  [x]  See flow sheet :   Rationale: increase strength and improve coordination  to improve the patients ability to improve core stability    15 min Manual Therapy: sidelying lumbar gapping stretch and gentle rotational stretching, STM/MFR to R lumbar paraspinals   Rationale: decrease pain, increase ROM, increase tissue extensibility, decrease trigger points, and increase postural awareness to improve the patients ability to return to gardening without pain     With   [] TE   [] TA   [] Neuro   [] SC   [] other: Patient Education: [x] Review HEP    [] Progressed/Changed HEP based on:   [] positioning   [] body mechanics   [] transfers   [] heat/ice application    [] other:       Other Objective/Functional Measures: --        Pain Level (0-10 scale) post treatment: 4     ASSESSMENT/Changes in Function:   Discussed whether she would like to continue as she is still having high pain levels, but she is noticing some strength improvements and wishes to continue, and will continue to emphasize core/trunk strength. Patient will continue to benefit from skilled PT services to modify and progress therapeutic interventions, address functional mobility deficits, address ROM deficits, address strength deficits, analyze and address soft tissue restrictions, and analyze and cue movement patterns to attain remaining goals. []  See Plan of Care  []  See progress note/recertification  []  See Discharge Summary         Progress towards goals / Updated goals:  Long Term Goals:  To be accomplished in 12-16 treatments:              1. Pt will be able to get up out of bed without increase in pain              2. Pt will be able to garden without balance difficulties or pain NOT MET              3. Pt will be able to stand to cook a meal without difficulty              4. Pt will be able to self-manage care using updated HEP for improved independence              5. Improved FOTO score to 60 or better to demonstrate improved function     PLAN  [x]  Upgrade activities as tolerated     [x]  Continue plan of care  []  Update interventions per flow sheet       []  Discharge due to:_  []  Other:_      Ryan Dia PT, DPT 9/1/2022

## 2022-09-02 ENCOUNTER — VIRTUAL VISIT (OUTPATIENT)
Dept: FAMILY MEDICINE CLINIC | Age: 67
End: 2022-09-02
Payer: MEDICARE

## 2022-09-02 DIAGNOSIS — R10.9 RIGHT FLANK PAIN: Primary | ICD-10-CM

## 2022-09-02 DIAGNOSIS — Z78.0 MENOPAUSE: ICD-10-CM

## 2022-09-02 PROCEDURE — 99443 PR PHYS/QHP TELEPHONE EVALUATION 21-30 MIN: CPT | Performed by: FAMILY MEDICINE

## 2022-09-02 RX ORDER — CEPHALEXIN 500 MG/1
500 CAPSULE ORAL 2 TIMES DAILY
Qty: 6 CAPSULE | Refills: 0 | Status: SHIPPED | OUTPATIENT
Start: 2022-09-02 | End: 2022-09-05

## 2022-09-02 NOTE — PROGRESS NOTES
Harrison Rinaldi is a 73 y.o. female who was seen by synchronous (real-time) audio-video technology on 9/2/2022. Assessment & Plan:   {There are no diagnoses linked to this encounter. (Refresh or delete this SmartLink)}    ***    {Assessment and Plan:37170}      Reviewed plan of care. Patient has provided input and agrees with goals. CPT Codes 84165-15331 for Established Patients may apply to this Telehealth Visit  {Time-based coding, delete if not needed:90247::\"I spent at least 15 minutes with this established patient, and >50% of the time was spent counseling and/or coordinating care regarding ***\"}    Subjective:   Harrison Rinaldi was seen for Follow-up (Mri results from 8/19/22)      HPI      ROS      Objective:       Physical Exam    Due to this being a TeleHealth evaluation, many elements of the physical examination are unable to be assessed. We discussed the expected course, resolution and complications of the diagnosis(es) in detail. Medication risks, benefits, costs, interactions, and alternatives were discussed as indicated. I advised her to contact the office if her condition worsens, changes or fails to improve as anticipated. She expressed understanding with the diagnosis(es) and plan. Pursuant to the emergency declaration under the [facility], [facility] waiver authority and the [employer] General Act, this Virtual  Visit was conducted, with patient's consent, to reduce the patient's risk of exposure to COVID-19 and provide continuity of care for an established patient. Services were provided through a video synchronous discussion virtually to substitute for in-person clinic visit.     Mariana Hdz MD

## 2022-09-02 NOTE — PROGRESS NOTES
Lulu Parada is a 73 y.o. female evaluated via telephone on 9/2/2022. Consent:  She and/or health care decision maker is aware that she may receive a bill for this telephone service, depending on her insurance coverage, and has provided verbal consent to proceed: Yes      Documentation:  I communicated with the patient and/or health care decision maker about her flank pain. 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 Also, she had spinal MRIs that were OK. Review of Systems   Constitutional:  Positive for malaise/fatigue. Negative for chills and fever. Gastrointestinal:  Positive for diarrhea and nausea. Negative for abdominal pain. She has had nausea and diarrhea almost every other day for the past 2 weeks. Genitourinary:  Positive for dysuria and flank pain. Negative for frequency, hematuria and urgency. Objective:     /82    Assessment & Plan:   Diagnoses and all orders for this visit:    1. Right flank pain  -     URINALYSIS W/ RFLX MICROSCOPIC; Future  -     CULTURE, URINE; Future  -     cephALEXin (Keflex) 500 mg capsule; Take 1 Capsule by mouth two (2) times a day for 3 days. -     CT ABD W WO CONT; Future    2. Menopause  -     DEXA BONE DENSITY STUDY AXIAL; Future      Likely UTI, but doubt this is the cause of her flank pain  Labs per orders. Keflex  Abdominal CT  Bone density          Reviewed plan of care. Patient has provided input and agrees with goals.       I affirm this is a Patient Initiated Episode with an Established Patient who has not had a related appointment within my department in the past 7 days or scheduled within the next 24 hours.     Total Time: minutes: 21-30 minutes    Note: not billable if this call serves to triage the patient into an appointment for the relevant concern      Daniella Torres MD

## 2022-09-06 ENCOUNTER — HOSPITAL ENCOUNTER (OUTPATIENT)
Dept: PHYSICAL THERAPY | Age: 67
Discharge: HOME | End: 2022-09-06
Payer: MEDICARE

## 2022-09-06 PROCEDURE — 97110 THERAPEUTIC EXERCISES: CPT | Performed by: PHYSICAL THERAPIST

## 2022-09-06 PROCEDURE — 97140 MANUAL THERAPY 1/> REGIONS: CPT | Performed by: PHYSICAL THERAPIST

## 2022-09-06 PROCEDURE — 97014 ELECTRIC STIMULATION THERAPY: CPT | Performed by: PHYSICAL THERAPIST

## 2022-09-06 PROCEDURE — 97112 NEUROMUSCULAR REEDUCATION: CPT | Performed by: PHYSICAL THERAPIST

## 2022-09-06 NOTE — PROGRESS NOTES
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  Did have a fair bit last night after a more active day than usual.     OBJECTIVE           Modality rationale: decrease inflammation and decrease pain to improve the patients ability to move without pain       Min Type Additional Details     15 [x] Estim: []Att   [x]Unatt        []TENS instruct                  [x]IFC  []Premod   []NMES                     []Other: []w/US   [x]w/ice   []w/heat  Position:  Location: back       []  Traction: [] Cervical       []Lumbar                       [] Prone          []Supine                       []Intermittent   []Continuous Lbs:  [] before manual  [] after manual  []w/heat       []  Ultrasound: []Continuous   [] Pulsed at:                           []1MHz   []3MHz Location:  W/cm2:       [] Paraffin        Location:  []w/heat       []  Ice     []  Heat  []  Ice massage Position:  Location:       []  Laser  []  Other: Position:  Location:          []  Vasopneumatic Device Pressure:       [] lo [] med [] hi  Temperature:        [x] Skin assessment post-treatment:  [x]intact []redness- no adverse reaction    []redness - adverse reaction:      24 min Therapeutic Exercise:  [x] See flow sheet :   Rationale: increase ROM and increase strength to improve the patients ability to move without pain     10 min Neuromuscular Re-education:  [x]  See flow sheet : biofeedback cuff, sit->stand w/ ball squeeze, press outs   Rationale: increase strength and improve coordination  to improve the patients ability to improve core stability     15 min Manual Therapy: sidelying lumbar gapping stretch and gentle rotational stretching, STM/MFR to R lumbar paraspinals   Rationale: decrease pain, increase ROM, increase tissue extensibility, decrease trigger points, and increase postural awareness to improve the patients ability to return to gardening without pain     With   [] TE   [] TA   [] Neuro   [] SC   [] other: Patient Education: [x] Review HEP    [] Progressed/Changed HEP based on:   [] positioning   [] body mechanics   [] transfers   [] heat/ice application    [] other:       Other Objective/Functional Measures: --        Pain Level (0-10 scale) post treatment: 1     ASSESSMENT/Changes in Function:   Doing well with her new strengthening exercises these last few sessions, and no longer is requiring assistance getting off the table. Patient will continue to benefit from skilled PT services to modify and progress therapeutic interventions, address functional mobility deficits, address ROM deficits, address strength deficits, analyze and address soft tissue restrictions, and analyze and cue movement patterns to attain remaining goals. []  See Plan of Care  []  See progress note/recertification  []  See Discharge Summary         Progress towards goals / Updated goals:  Long Term Goals:  To be accomplished in 12-16 treatments:              1. Pt will be able to get up out of bed without increase in pain               2. Pt will be able to garden without balance difficulties or pain NOT MET              3. Pt will be able to stand to cook a meal without difficulty               4. Pt will be able to self-manage care using updated HEP for improved independence              5. Improved FOTO score to 60 or better to demonstrate improved function     PLAN  [x]  Upgrade activities as tolerated     [x]  Continue plan of care  []  Update interventions per flow sheet       []  Discharge due to:_  []  Other:_      Karthik Mills, PT, DPT 9/6/2022

## 2022-09-08 ENCOUNTER — HOSPITAL ENCOUNTER (OUTPATIENT)
Dept: PHYSICAL THERAPY | Age: 67
Discharge: HOME | End: 2022-09-08
Payer: MEDICARE

## 2022-09-08 PROCEDURE — 97110 THERAPEUTIC EXERCISES: CPT | Performed by: PHYSICAL THERAPIST

## 2022-09-08 PROCEDURE — 97112 NEUROMUSCULAR REEDUCATION: CPT | Performed by: PHYSICAL THERAPIST

## 2022-09-08 PROCEDURE — 97014 ELECTRIC STIMULATION THERAPY: CPT | Performed by: PHYSICAL THERAPIST

## 2022-09-08 PROCEDURE — 97140 MANUAL THERAPY 1/> REGIONS: CPT | Performed by: PHYSICAL THERAPIST

## 2022-09-08 NOTE — PROGRESS NOTES
PT DAILY TREATMENT NOTE - 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      OBJECTIVE                 Modality rationale: decrease inflammation and decrease pain to improve the patients ability to move without pain       Min Type Additional Details     10 [x] Estim: []Att   [x]Unatt        []TENS instruct                  [x]IFC  []Premod   []NMES                     []Other: []w/US   [x]w/ice   []w/heat  Position:  Location: back       []  Traction: [] Cervical       []Lumbar                       [] Prone          []Supine                       []Intermittent   []Continuous Lbs:  [] before manual  [] after manual  []w/heat       []  Ultrasound: []Continuous   [] Pulsed at:                           []1MHz   []3MHz Location:  W/cm2:       [] Paraffin        Location:  []w/heat       []  Ice     []  Heat  []  Ice massage Position:  Location:       []  Laser  []  Other: Position:  Location:          []  Vasopneumatic Device Pressure:       [] lo [] med [] hi  Temperature:        [x] Skin assessment post-treatment:  [x]intact []redness- no adverse reaction    []redness - adverse reaction:      30 min Therapeutic Exercise:  [x] See flow sheet :   Rationale: increase ROM and increase strength to improve the patients ability to move without pain     10 min Neuromuscular Re-education:  [x]  See flow sheet : biofeedback cuff, sit->stand w/ ball squeeze, press outs   Rationale: increase strength and improve coordination  to improve the patients ability to improve core stability     15 min Manual Therapy: sidelying lumbar gapping stretch and gentle rotational stretching, STM/MFR to R lumbar paraspinals   Rationale: decrease pain, increase ROM, increase tissue extensibility, decrease trigger points, and increase postural awareness to improve the patients ability to return to gardening without pain     With   [] TE   [] TA   [] Neuro   [] SC   [] other: Patient Education: [x] Review HEP    [] Progressed/Changed HEP based on:   [] positioning   [] body mechanics   [] transfers   [] heat/ice application    [] other:       Other Objective/Functional Measures: --        Pain Level (0-10 scale) post treatment: 1     ASSESSMENT/Changes in Function:   Doing well with progressions of exercises these last few sessions. Patient will continue to benefit from skilled PT services to modify and progress therapeutic interventions, address functional mobility deficits, address ROM deficits, address strength deficits, analyze and address soft tissue restrictions, and analyze and cue movement patterns to attain remaining goals. []  See Plan of Care  []  See progress note/recertification  []  See Discharge Summary         Progress towards goals / Updated goals:  Long Term Goals:  To be accomplished in 12-16 treatments:              1. Pt will be able to get up out of bed without increase in pain               2. Pt will be able to garden without balance difficulties or pain NOT MET              3. Pt will be able to stand to cook a meal without difficulty               4. Pt will be able to self-manage care using updated HEP for improved independence              5. Improved FOTO score to 60 or better to demonstrate improved function     PLAN  [x]  Upgrade activities as tolerated     [x]  Continue plan of care  []  Update interventions per flow sheet       []  Discharge due to:_  []  Other:_      Patti Ruth, PT, DPT 9/8/2022

## 2022-09-09 ENCOUNTER — TELEPHONE (OUTPATIENT)
Dept: FAMILY MEDICINE CLINIC | Age: 67
End: 2022-09-09

## 2022-09-09 DIAGNOSIS — R10.9 RIGHT FLANK PAIN: Primary | ICD-10-CM

## 2022-09-10 ENCOUNTER — HOSPITAL ENCOUNTER (OUTPATIENT)
Dept: CT IMAGING | Age: 67
Discharge: HOME | End: 2022-09-10
Attending: FAMILY MEDICINE
Payer: MEDICARE

## 2022-09-10 DIAGNOSIS — R10.9 RIGHT FLANK PAIN: ICD-10-CM

## 2022-09-10 PROCEDURE — 74176 CT ABD & PELVIS W/O CONTRAST: CPT

## 2022-09-13 ENCOUNTER — HOSPITAL ENCOUNTER (OUTPATIENT)
Dept: PHYSICAL THERAPY | Age: 67
Discharge: HOME | End: 2022-09-13
Payer: MEDICARE

## 2022-09-13 PROCEDURE — 97112 NEUROMUSCULAR REEDUCATION: CPT | Performed by: PHYSICAL THERAPIST

## 2022-09-13 PROCEDURE — 97014 ELECTRIC STIMULATION THERAPY: CPT | Performed by: PHYSICAL THERAPIST

## 2022-09-13 PROCEDURE — 97110 THERAPEUTIC EXERCISES: CPT | Performed by: PHYSICAL THERAPIST

## 2022-09-13 PROCEDURE — 97140 MANUAL THERAPY 1/> REGIONS: CPT | Performed by: PHYSICAL THERAPIST

## 2022-09-13 NOTE — PROGRESS NOTES
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      OBJECTIVE                  Modality rationale: decrease inflammation and decrease pain to improve the patients ability to move without pain       Min Type Additional Details     15 [x] Estim: []Att   [x]Unatt        []TENS instruct                  [x]IFC  []Premod   []NMES                     []Other: []w/US   [x]w/ice   []w/heat  Position:  Location: back       []  Traction: [] Cervical       []Lumbar                       [] Prone          []Supine                       []Intermittent   []Continuous Lbs:  [] before manual  [] after manual  []w/heat       []  Ultrasound: []Continuous   [] Pulsed at:                           []1MHz   []3MHz Location:  W/cm2:       [] Paraffin        Location:  []w/heat       []  Ice     []  Heat  []  Ice massage Position:  Location:       []  Laser  []  Other: Position:  Location:          []  Vasopneumatic Device Pressure:       [] lo [] med [] hi  Temperature:        [x] Skin assessment post-treatment:  [x]intact []redness- no adverse reaction    []redness - adverse reaction:      32 min Therapeutic Exercise:  [x] See flow sheet :   Rationale: increase ROM and increase strength to improve the patients ability to move without pain     10 min Neuromuscular Re-education:  [x]  See flow sheet : biofeedback cuff, UTR   Rationale: increase strength and improve coordination  to improve the patients ability to improve core stability     15 min Manual Therapy: sidelying lumbar gapping stretch and gentle rotational stretching, STM/MFR to R lumbar paraspinals   Rationale: decrease pain, increase ROM, increase tissue extensibility, decrease trigger points, and increase postural awareness to improve the patients ability to return to gardening without pain     With   [] TE   [] TA   [] Neuro   [] SC   [] other: Patient Education: [x] Review HEP    [] Progressed/Changed HEP based on:   [] positioning   [] body mechanics   [] transfers   [] heat/ice application    [] other:       Other Objective/Functional Measures: FOTO: 40       Pain Level (0-10 scale) post treatment: 1     ASSESSMENT/Changes in Function:   Patient will continue to benefit from skilled PT services to modify and progress therapeutic interventions, address functional mobility deficits, address ROM deficits, address strength deficits, analyze and address soft tissue restrictions, and analyze and cue movement patterns to attain remaining goals.      []  See Plan of Care  [x]  See progress note/recertification  []  See Discharge Summary         PLAN  [x]  Upgrade activities as tolerated     [x]  Continue plan of care  []  Update interventions per flow sheet       []  Discharge due to:_  []  Other:_      Guadalupe Fleischer, PT, DPT 9/13/2022

## 2022-09-13 NOTE — PROGRESS NOTES
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 Navya Antonio has done well with 8 PT sessions addressing her trunk and muscle weakness. She is now able to get up out of a bed without difficulty, and has tolerated trunk stabilization and hip strengthening exercises without difficulty. However, she has not reported any significant long term benefit in her low back pain. Etiology of low back pain is somewhat unclear, but given presentation, would likely indicate musculo-skeletal origin of symptoms, likely with joint involvement. Could potentially benefit from further lumbar imaging. PT plan would be to continue to progress lumbar/hip strengthening per tolerance and continue to attempt to improve low back pain. Did request patient reach back out to PCP regarding limited improvements in low back pain. Long Term Goals: To be accomplished in 12-16 treatments:              1. Pt will be able to get up out of bed without increase in pain 75% MET              2. Pt will be able to garden without balance difficulties or pain NOT MET              3. Pt will be able to stand to cook a meal without difficulty NOT MET              4. Pt will be able to self-manage care using updated HEP for improved independence IMPROVING              5. Improved FOTO score to 60 or better to demonstrate improved function PARTIAL PROGRESS    Assessment / Recommendations:      Other: Continue PT to improve hip/lumbar strength while having patient reach back out to MD regarding ongoing pain levels.          Vj Bean, PT, DPT 9/13/2022

## 2022-09-14 ENCOUNTER — TELEPHONE (OUTPATIENT)
Dept: FAMILY MEDICINE CLINIC | Age: 67
End: 2022-09-14

## 2022-09-14 DIAGNOSIS — Z78.0 MENOPAUSE: ICD-10-CM

## 2022-09-14 DIAGNOSIS — N83.00 OVARIAN FOLLICULAR CYST: Primary | ICD-10-CM

## 2022-09-14 RX ORDER — IBUPROFEN 600 MG/1
600 TABLET ORAL
Qty: 30 TABLET | Refills: 0 | Status: SHIPPED | OUTPATIENT
Start: 2022-09-14 | End: 2022-09-24

## 2022-09-15 ENCOUNTER — HOSPITAL ENCOUNTER (OUTPATIENT)
Dept: PHYSICAL THERAPY | Age: 67
Discharge: HOME | End: 2022-09-15
Payer: MEDICARE

## 2022-09-15 PROCEDURE — 97014 ELECTRIC STIMULATION THERAPY: CPT | Performed by: PHYSICAL THERAPIST

## 2022-09-15 PROCEDURE — 97110 THERAPEUTIC EXERCISES: CPT | Performed by: PHYSICAL THERAPIST

## 2022-09-15 PROCEDURE — 97140 MANUAL THERAPY 1/> REGIONS: CPT | Performed by: PHYSICAL THERAPIST

## 2022-09-15 NOTE — PROGRESS NOTES
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 She states it began yesterday without mechanism and has continued into today. Pain L>R sided.      OBJECTIVE                  Modality rationale: decrease inflammation and decrease pain to improve the patients ability to move without pain       Min Type Additional Details     15 [x] Estim: []Att   [x]Unatt        []TENS instruct                  [x]IFC  []Premod   []NMES                     []Other: []w/US   [x]w/ice   []w/heat  Position:  Location: back       []  Traction: [] Cervical       []Lumbar                       [] Prone          []Supine                       []Intermittent   []Continuous Lbs:  [] before manual  [] after manual  []w/heat       []  Ultrasound: []Continuous   [] Pulsed at:                           []1MHz   []3MHz Location:  W/cm2:       [] Paraffin        Location:  []w/heat       []  Ice     []  Heat  []  Ice massage Position:  Location:       []  Laser  []  Other: Position:  Location:          []  Vasopneumatic Device Pressure:       [] lo [] med [] hi  Temperature:        [x] Skin assessment post-treatment:  [x]intact []redness- no adverse reaction    []redness - adverse reaction:      20 min Therapeutic Exercise:  [x] See flow sheet :   Rationale: increase ROM and increase strength to improve the patients ability to move without pain     25 min Manual Therapy: STM/MFR to bilateral lumbar paraspinals, prone sacral float, GI-II lumbar P-A L1-4   Rationale: decrease pain, increase ROM, increase tissue extensibility, decrease trigger points, and increase postural awareness to improve the patients ability to return to gardening without pain     With   [] TE   [] TA   [] Neuro   [] SC   [] other: Patient Education: [x] Review HEP    [] Progressed/Changed HEP based on:   [] positioning   [] body mechanics   [] transfers   [] heat/ice application    [] other:       Other Objective/Functional Measures: --     Pain Level (0-10 scale) post treatment: 4    ASSESSMENT/Changes in Function:   Some mild reduction in pain with interventions today, but PT has thus far been unsuccessful with pain reduction. Advised on other potential pain control methods, including back brace and TENS unit. She is going to follow up with Neuro early next week, and may adjust care if needed to allow patient to better work on pain management. Patient will continue to benefit from skilled PT services to modify and progress therapeutic interventions, address functional mobility deficits, address ROM deficits, address strength deficits, analyze and address soft tissue restrictions, and analyze and cue movement patterns to attain remaining goals. []  See Plan of Care  []  See progress note/recertification  []  See Discharge Summary         Progress towards goals / Updated goals:  Long Term Goals:  To be accomplished in 12-16 treatments:              1. Pt will be able to get up out of bed without increase in pain 75% MET              2. Pt will be able to garden without balance difficulties or pain NOT MET              3. Pt will be able to stand to cook a meal without difficulty NOT MET              4. Pt will be able to self-manage care using updated HEP for improved independence IMPROVING              5. Improved FOTO score to 60 or better to demonstrate improved function PARTIAL PROGRESS    PLAN  [x]  Upgrade activities as tolerated     [x]  Continue plan of care  []  Update interventions per flow sheet       []  Discharge due to:_  []  Other:_      Brad Cedeno, PT, DPT 9/15/2022

## 2022-09-20 ENCOUNTER — OFFICE VISIT (OUTPATIENT)
Dept: NEUROLOGY | Age: 67
End: 2022-09-20
Payer: MEDICARE

## 2022-09-20 VITALS
OXYGEN SATURATION: 96 % | BODY MASS INDEX: 23.66 KG/M2 | HEIGHT: 65 IN | DIASTOLIC BLOOD PRESSURE: 60 MMHG | SYSTOLIC BLOOD PRESSURE: 110 MMHG | WEIGHT: 142 LBS | HEART RATE: 83 BPM

## 2022-09-20 DIAGNOSIS — G89.29 CHRONIC LEFT-SIDED LOW BACK PAIN, UNSPECIFIED WHETHER SCIATICA PRESENT: Primary | ICD-10-CM

## 2022-09-20 DIAGNOSIS — M54.50 CHRONIC LEFT-SIDED LOW BACK PAIN, UNSPECIFIED WHETHER SCIATICA PRESENT: Primary | ICD-10-CM

## 2022-09-20 DIAGNOSIS — R20.2 PARESTHESIA: ICD-10-CM

## 2022-09-20 DIAGNOSIS — R41.89 COGNITIVE IMPAIRMENT: ICD-10-CM

## 2022-09-20 PROCEDURE — 99205 OFFICE O/P NEW HI 60 MIN: CPT | Performed by: PSYCHIATRY & NEUROLOGY

## 2022-09-20 PROCEDURE — 1090F PRES/ABSN URINE INCON ASSESS: CPT | Performed by: PSYCHIATRY & NEUROLOGY

## 2022-09-20 NOTE — LETTER
9/20/2022    Patient: Carmen Harding   YOB: 1955   Date of Visit: 9/20/2022     Emanuel Pratt, [address] [place]    Dear Emanuel Pratt MD,      Thank you for referring Ms. Carmen Harding to [facility] for evaluation. My notes for this consultation are attached. If you have questions, please do not hesitate to call me. I look forward to following your patient along with you.       Sincerely,    Troy Sims MD

## 2022-09-20 NOTE — PROGRESS NOTES
NEUROLOGY NEW PATIENT OFFICE CONSULTATION      9/20/2022    RE: Saloni Luther         1955      REFERRED BY:  Yobany Starks MD        CHIEF COMPLAINT:  This is Saloni Luther is a 73 y.o. female who had concerns including Neurologic Problem. HPI:     For the past 1 yr, patient noted tremors of both hands L worse than R, when resting and writing. On Propanolol. Patient also started having balance issues 6 months ago, with tendency to fall to the L side when bending over and problem initiating gait. (-) dizziness (-) lightheadedness. 1 month ago, patient noted on and off bilateral hand and feet numbness, involving all fingers and big toes. (+) chronic lower back pain since falling; L sided, non-radiating    Patient saw neurosurgeon Dr Mukesh Dodson who plans to monitor it with repeat MRI in Nov.    Getting physical therapy with minimal benefit. (+) occasional headache    Recent tests done:  MRI brain (8/4/22): 1. A 1.8 x 1.5 x 2.0 cm avidly enhancing mass in the region of the pineal gland. Mild mass effect on the superior tectum, without parenchymal edema. 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 However,  differential considerations are broad, including pineal gland neoplasms (either  benign or malignant), lymphoma, and metastasis among other considerations. 2. Mild chronic microvascular ischemic disease. No evidence of acute infarct. MRI cervical spine (8/29/22): C6-C7: More significant broad bulge of disc osteophyte, effacing the thecal sac but without cord compression or mee central stenosis. Moderately severe bilateral foraminal stenosis with uncal hypertrophy. MRI thoracic spine (8/19/22):  Acute or subacute mild compression deformities at T11 and L1, as described above, with mild retropulsion of bone at the superior endplates of each level,  but no associated central stenosis, cord injury or compression. ROS  (-) fever  (-) rash  All other systems reviewed and are negative    Past Medical Hx  Past Medical History:   Diagnosis Date    Allergic conjunctivitis of both eyes 10/19/2015    Brain tumor ([address]     Depression 09/11/2014    Essential hypertension 12/15/2017    Hot flashes 06/23/2014    Hot flashes     Hypercholesterolemia     Osteoarthritis of right knee 03/11/2014    Osteoarthritis of right knee 03/11/2014       Social Hx  Social History     Socioeconomic History    Marital status:    Tobacco Use    Smoking status: Former     Types: Cigarettes    Smokeless tobacco: Never    Tobacco comments:     for only a couple of years in her late teens   Vaping Use    Vaping Use: Never used   Substance and Sexual Activity    Alcohol use: Yes     Comment: 2 glasses of wine, 2-3 x a week    Drug use: No    Sexual activity: Yes     Partners: Female       Family Hx  Family History   Problem Relation Age of Onset    Elevated Lipids Mother     Elevated Lipids Father     Hypertension Father     Elevated Lipids Sister    Mother - tremors - 76s  Uncle - brain cancer    ALLERGIES  Allergies   Allergen Reactions    Effexor [Venlafaxine] Other (comments)     jittery       CURRENT MEDS  Current Outpatient Medications   Medication Sig Dispense Refill    ibuprofen (MOTRIN) 600 mg tablet Take 1 Tablet by mouth every six (6) hours as needed for Pain. 30 Tablet 0    pravastatin (PRAVACHOL) 40 mg tablet TAKE ONE TABLET BY MOUTH ONCE NIGHTLY 90 Tablet 2    multivitamin (ONE A DAY) tablet Take 1 Tablet by mouth in the morning. busPIRone (BUSPAR) 5 mg tablet TAKE ONE TABLET BY MOUTH TWICE A  Tablet 3    amLODIPine (NORVASC) 5 mg tablet Take 1 Tablet by mouth daily. 90 Tablet 3    propranolol LA (INDERAL LA) 60 mg SR capsule Take 1 Capsule by mouth in the morning.  (Patient not taking: Reported on 9/20/2022) 90 Capsule 0    sertraline (Zoloft) 50 mg tablet Take 1 Tablet by mouth daily. (Patient not taking: Reported on 9/20/2022) 30 Tablet 0           PREVIOUS WORKUP: (reviewed)  IMAGING:    CT Results (recent):  Results from Hospital Encounter encounter on 09/10/22    CT ABD PELV WO CONT    Narrative  INDICATION: Unspecified abdominal pain. Exam: Noncontrast CT of the abdomen and pelvis is performed with 5 mm  collimation. Sagittal and coronal reformatted images were also performed. CT dose reduction was achieved through the use of a standardized protocol  tailored for this examination and automatic exposure control for dose  modulation. Comparison is made to prior MRI of the thoracic spine dated August 19, 2022. Direct comparison is made to prior CT of the chest, including upper abdomen,  dated April 2022. FINDINGS:    There is minimal bibasilar linear scarring. Abdomen:    Liver: The liver is normal on noncontrast images. Spleen: The spleen is normal on noncontrast images. Adrenals: The adrenals are normal on noncontrast images. Pancreas: The pancreas is normal on noncontrast images. Gallbladder: The gallbladder is normal on noncontrast images. Kidneys: There is no perinephric stranding, hydronephrosis or hydroureter. No  renal, ureteral bladder calculus is visualized. Bowel: No thickened or dilated loop of large or small bowel seen. Appendix: The appendix is normal.    Bones: T11 and T12 compression deformities are unchanged as compared to prior  thoracic spine dated August 2022. Pelvis: Urinary bladder is partially filled and grossly normal.    Miscellaneous: There is no free intraperitoneal gas or fluid. There is no focal  fluid collection to suggest abscess. There is a 2.7 cm right ovarian follicle. Impression  No renal calculi or evidence of obstructive uropathy.       MRI Results (recent):  Results from [place] encounter on 08/19/22    MRI [facility] SPINE WO CONT    Narrative  EXAM: MRI [facility] SPINE WO CONT    INDICATION: Previously described history of fall in July 2022. [place] Disease of  spinal cord, unspecified    COMPARISON: None    TECHNIQUE: MR imaging of the thoracic spine was performed using the following  sequences: sagittal T1, T2, stir; axial T1, T2.    CONTRAST: None. FINDINGS:    Alignment of the thoracic spine is remarkable for mild thoracic  dextrocurvature. . Vertebral body height is diminished mildly anteriorly at T1,  as described on earlier studies, nonacute within normal marrow signal  intensity. . However, at the level T 11, there is mild superior endplate  compression with loss of 10-15% vertebral body height centrally with mild  retropulsion of bone at the superior endplate. There is also superior endplate  compression at L1, with less than 10% loss of height centrally. STIR  hyperintensity and T1 hypointensity are noted in the superior half of the  vertebral body of T11 and in a broad band adjacent to the superior endplate of  L1. [place] Disc osteophyte protrudes posteriorly at multiple levels, most  significantly at T2-3, T8-9 and T12-L1. There is a large Schmorl's node in the  inferior endplate of L2. The course, caliber, and signal intensity of the spinal cord are normal. The  conus medullaris terminates at L2. The paraspinal soft tissues are within normal limits. T2-3: Posterior protrusion of disc osteophyte slightly effacing the thecal sac. T10-11: Mild protrusion of T11 superior endplate related to compression  deformity, only slightly effacing the thecal sac with no cord compression. Impression  1. Acute or subacute mild compression deformities at T11 and L1, as described  above, with mild retropulsion of bone at the superior endplates of each level,  but no associated central stenosis, cord injury or compression. 2. Multilevel mild degenerative change otherwise. 3. Nonacute anterior T1 compression deformity. 4. Mild thoracic dextrocurvature.       IR Results (recent):  No results found for this or any previous visit. VAS/US Results (recent):  No results found for this or any previous visit. LABS (reviewed)  Results for orders placed or performed in visit on 09/02/22   CULTURE, URINE    Specimen: Urine   Result Value Ref Range    Special Requests: NO SPECIAL REQUESTS      Culture result: No growth (<1,000 CFU/ML)     URINALYSIS W/ RFLX MICROSCOPIC   Result Value Ref Range    Color YELLOW/STRAW      Appearance CLEAR CLEAR      Specific gravity 1.007 1.003 - 1.030      pH (UA) 5.5 5.0 - 8.0      Protein Negative Negative mg/dL    Glucose Negative Negative mg/dL    Ketone Negative Negative mg/dL    Bilirubin Negative Negative      Blood Negative Negative      Urobilinogen 0.2 0.2 - 1.0 EU/dL    Nitrites Negative Negative      Leukocyte Esterase TRACE (A) Negative      WBC 0-4 0 - 4 /hpf    RBC 0-5 0 - 5 /hpf    Epithelial cells FEW FEW /lpf    Bacteria Negative Negative /hpf       Physical Exam:   Visit Vitals  /60   Pulse 83   Ht 5' 5\" (1.651 m)   Wt 64.4 kg (142 lb)   LMP 01/01/2009   SpO2 96%   BMI 23.63 kg/m²     General:  Alert, cooperative, no distress. Head:  Normocephalic, without obvious abnormality, atraumatic. Eyes:  Conjunctivae/corneas clear. Lungs:  Heart:   Non labored breathing  Regular rate and rhythm, no carotid bruits   Abdomen:   Soft, non-distended   Extremities: Extremities normal, atraumatic, no cyanosis or edema. Pulses: 2+ and symmetric all extremities. Skin: Skin color, texture, turgor normal. No rashes or lesions.   Neurologic Exam     Gen: Attention normal             Language: naming, repetition, fluency normal             Memory: intact recent and remote memory  Cranial Nerves:  I: smell Not tested   II: visual fields Full to confrontation   II: pupils Equal, round, reactive to light   II: optic disc No papilledema   III,VII: ptosis none   III,IV,VI: extraocular muscles  Full ROM   V: mastication normal   V: facial light touch sensation  normal   VII: facial muscle function   symmetric   VIII: hearing symmetric   IX: soft palate elevation  normal   XI: trapezius strength  5/5   XI: sternocleidomastoid strength 5/5   XI: neck flexion strength  5/5   XII: tongue  midline     Motor: normal bulk and tone, no tremor              Strength: 5/5 all four extremities  No hand tremors noted  (-) rigidity  (-) bradykinesia  (-) masked facies  Sensory: intact to LT, PP, vibration, and JPS  Reflexes: 3+ UE and knees 2+ ankles throughout; Down going toes  Coordination: Good FTN and HTS  Gait: normal gait including tandem            Impression:     Kristi Darby is a 73 y.o. female who  has a past medical history of Allergic conjunctivitis of both eyes (10/19/2015), Brain tumor [address] [address] Depression (09/11/2014), Essential hypertension (12/15/2017), Hot flashes (06/23/2014), Hot flashes, Hypercholesterolemia, Osteoarthritis of right knee (03/11/2014), and Osteoarthritis of right knee (03/11/2014) who for the past 1 yr, noted tremors of both hands L worse than R, when resting and writing. Was on Propanolol. Consideration includes essential tremor (although no tremor observed during her visit today). The absence of other extrapyramidal features on exam makes Parkinson's disease less likely. Patient also started having balance issues 6 months ago, with tendency to fall to the L side when bending over and problem initiating gait. (-) dizziness (-) lightheadedness. Consideration whether this is related to 1.8 x 1.5 x 2.0 cm avidly enhancing mass in the region of the pineal gland. Mild mass effect on the superior tectum, without parenchymal edema. Also has small 6 mm meningioma at the left superior frontal convexity. Third, patient noted on and off bilateral hand and feet numbness, involving all fingers and big toes. . Considerations include carpal tunnel syndrome and lumbar radiculopathy (chronic lower back pain since falling; L sided, non-radiating)    Lastly, patient is also complaining of cognitive issues. Recent tests done:  MRI brain (8/4/22): 1. A 1.8 x 1.5 x 2.0 cm avidly enhancing mass in the region of the pineal gland. Mild mass effect on the superior tectum, without parenchymal edema. 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 However,  differential considerations are broad, including pineal gland neoplasms (either  benign or malignant), lymphoma, and metastasis among other considerations. 2. Mild chronic microvascular ischemic disease. No evidence of acute infarct. MRI cervical spine (8/29/22): C6-C7: More significant broad bulge of disc osteophyte, effacing the thecal sac but without cord compression or mee central stenosis. Moderately severe bilateral foraminal stenosis with uncal hypertrophy. MRI thoracic spine (8/19/22): Acute or subacute mild compression deformities at T11 and L1, as described above, with mild retropulsion of bone at the superior endplates of each level,  but no associated central stenosis, cord injury or compression. RECOMMENDATIONS  1. I had a long discussion with patient and daughter. Discussed diagnosis, prognosis, pathophysiology and available treatment. Reviewed test results. All questions were answered. 2. Reviewed medical records in EP[facility]  3. I personally reviewed the MRI brain images   4. Patient saw neurosurgeon Dr Caryl Perdomo who plans to monitor mass with repeat MRI brain in Nov.  5. EMG/NCS of the L UE and L LE with CTS and radiculopathy protocol  6. MRI lumbar spine to look for bulging disc and spinal stenosis  7. Patient is getting physical therapy with minimal benefit. 8. Patient wants her memory to be tested. Will refer to Dr Milka Nelson for neuropsychological testing      Follow-up and Dispositions    Return for above testing.           I spent total of 80 mins with the patient, greater than 50% of the visit was spent on providing counseling and coordination of care.       Thank you for the consultation      Tariq Adam MD  Diplomate, American Board of Psychiatry and Neurology  Diplomate, Neuromuscular Medicine  Diplomate, American Board of Electrodiagnostic Medicine        CC: Avni Brody MD  Fax: [phone]

## 2022-09-21 ENCOUNTER — HOSPITAL ENCOUNTER (OUTPATIENT)
Dept: PHYSICAL THERAPY | Age: 67
Discharge: HOME | End: 2022-09-21
Payer: MEDICARE

## 2022-09-21 PROCEDURE — 97110 THERAPEUTIC EXERCISES: CPT | Performed by: PHYSICAL THERAPIST

## 2022-09-21 PROCEDURE — 97140 MANUAL THERAPY 1/> REGIONS: CPT | Performed by: PHYSICAL THERAPIST

## 2022-09-21 PROCEDURE — 97014 ELECTRIC STIMULATION THERAPY: CPT | Performed by: PHYSICAL THERAPIST

## 2022-09-21 PROCEDURE — 97112 NEUROMUSCULAR REEDUCATION: CPT | Performed by: PHYSICAL THERAPIST

## 2022-09-21 NOTE — PROGRESS NOTES
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 She states that she is going to get an MRI on her lumbar spine next week. She says that her pain levels are not improving, but strength is still getting better.      OBJECTIVE                Modality rationale: decrease inflammation and decrease pain to improve the patients ability to move without pain       Min Type Additional Details     15 [x] Estim: []Att   [x]Unatt        []TENS instruct                  [x]IFC  []Premod   []NMES                     []Other: []w/US   [x]w/ice   []w/heat  Position:  Location: back       []  Traction: [] Cervical       []Lumbar                       [] Prone          []Supine                       []Intermittent   []Continuous Lbs:  [] before manual  [] after manual  []w/heat       []  Ultrasound: []Continuous   [] Pulsed at:                           []1MHz   []3MHz Location:  W/cm2:       [] Paraffin        Location:  []w/heat       []  Ice     []  Heat  []  Ice massage Position:  Location:       []  Laser  []  Other: Position:  Location:          []  Vasopneumatic Device Pressure:       [] lo [] med [] hi  Temperature: [x] Skin assessment post-treatment:  [x]intact []redness- no adverse reaction    []redness - adverse reaction:      19 min Therapeutic Exercise:  [x] See flow sheet :   Rationale: increase ROM and increase strength to improve the patients ability to move without pain     15 min Neuromuscular Re-education:  [x]  See flow sheet : core press, ab stab with shoulder extension, PPT w/ cuff   Rationale: increase strength and improve coordination  to improve the patients ability to get up without pain     15 min Manual Therapy: STM/MFR to L QL and paraspinals, sidelying QL gapping stretch with legs off table   Rationale: decrease pain, increase ROM, increase tissue extensibility, decrease trigger points, and increase postural awareness to improve the patients ability to return to gardening without pain     With   [] TE   [] TA   [] Neuro   [] SC   [] other: Patient Education: [x] Review HEP    [] Progressed/Changed HEP based on:   [] positioning   [] body mechanics   [] transfers   [] heat/ice application    [] other:       Other Objective/Functional Measures: --     Pain Level (0-10 scale) post treatment: 2     ASSESSMENT/Changes in Function:   Responding well to treatment, but carry over with pain relief has not been happening. Patient will continue to benefit from skilled PT services to modify and progress therapeutic interventions, address functional mobility deficits, address ROM deficits, address strength deficits, analyze and address soft tissue restrictions, and analyze and cue movement patterns to attain remaining goals. []  See Plan of Care  []  See progress note/recertification  []  See Discharge Summary         Progress towards goals / Updated goals:  Long Term Goals:  To be accomplished in 12-16 treatments:              1. Pt will be able to get up out of bed without increase in pain 75% MET              2. Pt will be able to garden without balance difficulties or pain NOT MET              3. Pt will be able to stand to cook a meal without difficulty NOT MET              4. Pt will be able to self-manage care using updated HEP for improved independence IMPROVING              5. Improved FOTO score to 60 or better to demonstrate improved function PARTIAL PROGRESS     PLAN  [x]  Upgrade activities as tolerated     [x]  Continue plan of care  []  Update interventions per flow sheet       []  Discharge due to:_  []  Other:_      Ashanti Singh, PT, DPT 9/21/2022

## 2022-09-23 ENCOUNTER — HOSPITAL ENCOUNTER (OUTPATIENT)
Dept: MAMMOGRAPHY | Age: 67
Discharge: HOME | End: 2022-09-23
Attending: FAMILY MEDICINE
Payer: MEDICARE

## 2022-09-23 DIAGNOSIS — Z78.0 MENOPAUSE: ICD-10-CM

## 2022-09-23 PROCEDURE — 77080 DXA BONE DENSITY AXIAL: CPT

## 2022-09-24 RX ORDER — IBUPROFEN 600 MG/1
TABLET ORAL
Qty: 30 TABLET | Refills: 0 | Status: SHIPPED | OUTPATIENT
Start: 2022-09-24 | End: 2022-10-19 | Stop reason: SDUPTHER

## 2022-09-27 ENCOUNTER — HOSPITAL ENCOUNTER (OUTPATIENT)
Dept: PHYSICAL THERAPY | Age: 67
Discharge: HOME | End: 2022-09-27
Payer: MEDICARE

## 2022-09-27 PROCEDURE — 97140 MANUAL THERAPY 1/> REGIONS: CPT | Performed by: PHYSICAL THERAPIST

## 2022-09-27 PROCEDURE — 97112 NEUROMUSCULAR REEDUCATION: CPT | Performed by: PHYSICAL THERAPIST

## 2022-09-27 PROCEDURE — 97110 THERAPEUTIC EXERCISES: CPT | Performed by: PHYSICAL THERAPIST

## 2022-09-27 PROCEDURE — 97014 ELECTRIC STIMULATION THERAPY: CPT | Performed by: PHYSICAL THERAPIST

## 2022-09-27 NOTE — PROGRESS NOTES
PT DAILY TREATMENT NOTE - 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 Overall, however, not a big change.      OBJECTIVE    Modality rationale: decrease inflammation and decrease pain to improve the patients ability to move without pain       Min Type Additional Details     15 [x] Estim: []Att   [x]Unatt        []TENS instruct                  [x]IFC  []Premod   []NMES                     []Other: []w/US   [x]w/ice   []w/heat  Position:  Location: back       []  Traction: [] Cervical       []Lumbar                       [] Prone          []Supine                       []Intermittent   []Continuous Lbs:  [] before manual  [] after manual  []w/heat       []  Ultrasound: []Continuous   [] Pulsed at:                           []1MHz   []3MHz Location:  W/cm2:       [] Paraffin        Location:  []w/heat       []  Ice     []  Heat  []  Ice massage Position:  Location:       []  Laser  []  Other: Position:  Location:          []  Vasopneumatic Device Pressure:       [] lo [] med [] hi  Temperature:        [x] Skin assessment post-treatment:  [x]intact []redness- no adverse reaction    []redness - adverse reaction:      15 min Therapeutic Exercise:  [x] See flow sheet :   Rationale: increase ROM and increase strength to improve the patients ability to move without pain     10 min Neuromuscular Re-education:  [x]  See flow sheet : core press, ab stab with shoulder extension, PPT w/ cuff   Rationale: increase strength and improve coordination  to improve the patients ability to get up without pain      15 min Manual Therapy: STM/MFR to L QL and paraspinals, sidelying QL gapping stretch with legs off table   Rationale: decrease pain, increase ROM, increase tissue extensibility, decrease trigger points, and increase postural awareness to improve the patients ability to return to gardening without pain     With   [] TE   [] TA   [] Neuro   [] SC   [] other: Patient Education: [x] Review HEP    [] Progressed/Changed HEP based on:   [] positioning   [] body mechanics   [] transfers   [] heat/ice application    [] other:       Other Objective/Functional Measures: --     Pain Level (0-10 scale) post treatment: 1     ASSESSMENT/Changes in Function:   Pain relief was better after more focused MFR to L QL last visit, so did the same today. Will see how she responds. Patient will continue to benefit from skilled PT services to modify and progress therapeutic interventions, address functional mobility deficits, address ROM deficits, address strength deficits, analyze and address soft tissue restrictions, and analyze and cue movement patterns to attain remaining goals. []  See Plan of Care  []  See progress note/recertification  []  See Discharge Summary         Progress towards goals / Updated goals:  Long Term Goals:  To be accomplished in 12-16 treatments:              1. Pt will be able to get up out of bed without increase in pain 75% MET              2. Pt will be able to garden without balance difficulties or pain NOT MET              3. Pt will be able to stand to cook a meal without difficulty NOT MET              4. Pt will be able to self-manage care using updated HEP for improved independence IMPROVING              5. Improved FOTO score to 60 or better to demonstrate improved function PARTIAL PROGRESS     PLAN  [x]  Upgrade activities as tolerated     [x]  Continue plan of care  []  Update interventions per flow sheet       []  Discharge due to:_  []  Other:_      Artur Sousa, PT, DPT 9/27/2022

## 2022-09-28 ENCOUNTER — HOSPITAL ENCOUNTER (OUTPATIENT)
Dept: MRI IMAGING | Age: 67
Discharge: HOME | End: 2022-09-28
Attending: PSYCHIATRY & NEUROLOGY
Payer: MEDICARE

## 2022-09-28 DIAGNOSIS — G89.29 CHRONIC LEFT-SIDED LOW BACK PAIN, UNSPECIFIED WHETHER SCIATICA PRESENT: ICD-10-CM

## 2022-09-28 DIAGNOSIS — R20.2 PARESTHESIA: ICD-10-CM

## 2022-09-28 DIAGNOSIS — M54.50 CHRONIC LEFT-SIDED LOW BACK PAIN, UNSPECIFIED WHETHER SCIATICA PRESENT: ICD-10-CM

## 2022-09-28 PROCEDURE — 72148 MRI LUMBAR SPINE W/O DYE: CPT

## 2022-09-29 ENCOUNTER — HOSPITAL ENCOUNTER (OUTPATIENT)
Dept: PHYSICAL THERAPY | Age: 67
Discharge: HOME | End: 2022-09-29
Payer: MEDICARE

## 2022-09-29 PROCEDURE — 97014 ELECTRIC STIMULATION THERAPY: CPT | Performed by: PHYSICAL THERAPIST

## 2022-09-29 PROCEDURE — 97140 MANUAL THERAPY 1/> REGIONS: CPT | Performed by: PHYSICAL THERAPIST

## 2022-09-29 NOTE — PROGRESS NOTES
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 Says that it really felt good after last visit for a few hours, then came back worse.      OBJECTIVE           Modality rationale: decrease inflammation and decrease pain to improve the patients ability to move without pain       Min Type Additional Details     15 [x] Estim: []Att   [x]Unatt        []TENS instruct                  [x]IFC  []Premod   []NMES                     []Other: []w/US   [x]w/ice   []w/heat  Position:  Location: back       []  Traction: [] Cervical       []Lumbar                       [] Prone          []Supine                       []Intermittent   []Continuous Lbs:  [] before manual  [] after manual  []w/heat       []  Ultrasound: []Continuous   [] Pulsed at:                           []1MHz   []3MHz Location:  W/cm2:       [] Paraffin        Location:  []w/heat       []  Ice     []  Heat  []  Ice massage Position:  Location:       []  Laser  []  Other: Position:  Location:          []  Vasopneumatic Device Pressure:       [] lo [] med [] hi  Temperature:        [x] Skin assessment post-treatment:  [x]intact []redness- no adverse reaction []redness - adverse reaction:      9 min Therapeutic Exercise:  [x] See flow sheet : assessment of status with MRI review/discussion for ongoing HEP   Rationale: increase ROM and increase strength to improve the patients ability to move without pain     23 min Manual Therapy: STM/MFR to L QL and paraspinals, sidelying QL gapping stretch with legs off table   Rationale: decrease pain, increase ROM, increase tissue extensibility, decrease trigger points, and increase postural awareness to improve the patients ability to return to gardening without pain     With   [] TE   [] TA   [] Neuro   [] SC   [] other: Patient Education: [x] Review HEP    [] Progressed/Changed HEP based on:   [] positioning   [] body mechanics   [] transfers   [] heat/ice application    [] other:       Other Objective/Functional Measures: from MRI:  IMPRESSION  Mild superior endplate compression deformities at T11 and L1. Less than 50% loss  vertebral body height. No cortical retropulsion or epidural hematomas are  identified. These fractures are amenable to percutaneous kyphoplasty for pain palliation. Multilevel minimal disc and facet degenerative change. There is no significant  canal or foraminal compromise identified. Pain Level (0-10 scale) post treatment: 1     ASSESSMENT/Changes in Function:   Discussed MRI results and how it affects PT and ongoing HEP. Will hold on further PT at this time. This does give some explanation about why she only gets very short term relief with PT. Patient will continue to benefit from skilled PT services to modify and progress therapeutic interventions, address functional mobility deficits, address ROM deficits, address strength deficits, analyze and address soft tissue restrictions, and analyze and cue movement patterns to attain remaining goals.      []  See Plan of Care  []  See progress note/recertification  []  See Discharge Summary         Progress towards goals / Updated goals:  Long Term Goals:  To be accomplished in 12-16 treatments:              1. Pt will be able to get up out of bed without increase in pain 75% MET              2. Pt will be able to garden without balance difficulties or pain NOT MET              3. Pt will be able to stand to cook a meal without difficulty NOT MET              4. Pt will be able to self-manage care using updated HEP for improved independence IMPROVING              5. Improved FOTO score to 60 or better to demonstrate improved function PARTIAL PROGRESS     PLAN  [x]  Upgrade activities as tolerated     [x]  Continue plan of care  []  Update interventions per flow sheet       []  Discharge due to:_  []  Other:_         Thuy Del Cid, PT, DPT 9/29/2022

## 2022-10-03 ENCOUNTER — TELEPHONE (OUTPATIENT)
Dept: NEUROLOGY | Age: 67
End: 2022-10-03

## 2022-10-04 NOTE — TELEPHONE ENCOUNTER
Called pt. Verified. Offered pt an earlier appointment to discuss MRI lumbars spine results, and stated can review all tests when Dr. Dwayne Castillo see her for her EMG/NCS which is scheduled for 10/28/22.  Pt verbalizes would like a sooner appt and is scheduled with Dr. Dwayne Castillo on Thursday 10/6/22 at 10:20AM.

## 2022-10-05 NOTE — PROGRESS NOTES
Neurology Progress Note    Patient ID:  Wes Garcia  [id number]  73 y.o.  1955      Subjective:   History:  Wes Garcia is a 73 y.o. female who  has a past medical history of Allergic conjunctivitis of both eyes (10/19/2015), Brain tumor ([facility] [address] Depression (09/11/2014), Essential hypertension (12/15/2017), Hot flashes (06/23/2014), Hot flashes, Hypercholesterolemia, Osteoarthritis of right knee (03/11/2014), and Osteoarthritis of right knee (03/11/2014) who for the past 1 yr, noted tremors of both hands L worse than R, when resting and writing. Was on Propanolol. Consideration includes essential tremor (although no tremor observed during her visit today). The absence of other extrapyramidal features on exam makes Parkinson's disease less likely. Patient also started having balance issues 6 months ago, with tendency to fall to the L side when bending over and problem initiating gait. (-) dizziness (-) lightheadedness. Consideration whether this is related to 1.8 x 1.5 x 2.0 cm avidly enhancing mass in the region of the pineal gland. Mild mass effect on the superior tectum, without parenchymal edema. Also has small 6 mm meningioma at the left superior frontal convexity. Third, patient noted on and off bilateral hand and feet numbness, involving all fingers and big toes. . Considerations include carpal tunnel syndrome and lumbar radiculopathy (chronic lower back pain since falling; L sided, non-radiating)     Lastly, patient is also complaining of cognitive issues.     Currently, patient remains the same          ROS:  Per HPI-  Otherwise the remainder of ROS was negative    Social Hx  Social History     Socioeconomic History    Marital status:    Tobacco Use    Smoking status: Former     Types: Cigarettes    Smokeless tobacco: Never    Tobacco comments:     for only a couple of years in her late teens   Vaping Use    Vaping Use: Never used   Substance and Sexual Activity    Alcohol use: Yes     Comment: 2 glasses of wine, 2-3 x a week    Drug use: No    Sexual activity: Yes     Partners: Female       Meds:  Current Outpatient Medications on File Prior to Visit   Medication Sig Dispense Refill    ibuprofen (MOTRIN) 600 mg tablet TAKE ONE TABLET BY MOUTH EVERY 6 HOURS AS NEEDED FOR PAIN 30 Tablet 0    pravastatin (PRAVACHOL) 40 mg tablet TAKE ONE TABLET BY MOUTH ONCE NIGHTLY 90 Tablet 2    multivitamin (ONE A DAY) tablet Take 1 Tablet by mouth in the morning. busPIRone (BUSPAR) 5 mg tablet TAKE ONE TABLET BY MOUTH TWICE A  Tablet 3    amLODIPine (NORVASC) 5 mg tablet Take 1 Tablet by mouth daily. 90 Tablet 3     No current facility-administered medications on file prior to visit. Imaging:    CT Results (recent):  Results from Hospital Encounter encounter on 09/10/22    CT ABD PELV WO CONT    Narrative  INDICATION: Unspecified abdominal pain. Exam: Noncontrast CT of the abdomen and pelvis is performed with 5 mm  collimation. Sagittal and coronal reformatted images were also performed. CT dose reduction was achieved through the use of a standardized protocol  tailored for this examination and automatic exposure control for dose  modulation. Comparison is made to prior MRI of the thoracic spine dated August 19, 2022. Direct comparison is made to prior CT of the chest, including upper abdomen,  dated April 2022. FINDINGS:    There is minimal bibasilar linear scarring. Abdomen:    Liver: The liver is normal on noncontrast images. Spleen: The spleen is normal on noncontrast images. Adrenals: The adrenals are normal on noncontrast images. Pancreas: The pancreas is normal on noncontrast images. Gallbladder: The gallbladder is normal on noncontrast images. Kidneys: There is no perinephric stranding, hydronephrosis or hydroureter. No  renal, ureteral bladder calculus is visualized.     Bowel: No thickened or dilated loop of large or small bowel seen.    Appendix: The appendix is normal.    Bones: T11 and T12 compression deformities are unchanged as compared to prior  thoracic spine dated August 2022. Pelvis: Urinary bladder is partially filled and grossly normal.    Miscellaneous: There is no free intraperitoneal gas or fluid. There is no focal  fluid collection to suggest abscess. There is a 2.7 cm right ovarian follicle. Impression  No renal calculi or evidence of obstructive uropathy. MRI Results (recent):  Results from [place] encounter on 09/28/22    MRI LUMB SPINE WO CONT    Narrative  EXAM: MRI LUMB SPINE WO CONT  Clinical history: Lumbar radiculopathy  INDICATION: Lumbar radiculopathy. Low back pain, unspecified    COMPARISON: None    TECHNIQUE: MR imaging of the lumbar spine was performed using the following  sequences: sagittal T1, T2, STIR;  axial T1, T2.    CONTRAST:  None. FINDINGS:  There is superior endplate compression L1. Marrow signal change at T11 as well. Mild superior compression fracture at T11. Likely Schmorl's node at L2 and  likely Schmorl's node at L4. There is no focal marrow lesion. The conus  medullaris terminates at L2. . Minimal retrolisthesis of L4 on L5. Abdominal  aorta is normal in caliber. Proximal common iliac vessels are normal in caliber. The paraspinal soft tissues are within normal limits. Lower thoracic spine: No herniation or stenosis. L1-L2: No herniation or stenosis. L2-L3: Disc desiccation. Disc bulge. Loss of disc height. Mild facet  arthropathy. Canal and foramina are patent. L3-L4: Disc desiccation. Minimal disc bulge. Minimal facet arthropathy. Canal is  patent. Foramina are patent. L4-L5: Disc desiccation. Minimal disc bulge/osteophyte extends the foramina. Canal is patent. Foramina are patent    L5-S1: Minimal facet arthropathy. Minimal disc bulge. Canal and foramina are  patent. Impression  Mild superior endplate compression deformities at T11 and L1.  Less than 50% loss  vertebral body height. No cortical retropulsion or epidural hematomas are  identified. These fractures are amenable to percutaneous kyphoplasty for pain palliation. Multilevel minimal disc and facet degenerative change. There is no significant  canal or foraminal compromise identified. IR Results (recent):  No results found for this or any previous visit. VAS/US Results (recent):  No results found for this or any previous visit. Reviewed records in [facility] and [facility] tab today    Lab Review     Orders Only on 09/02/2022   Component Date Value Ref Range Status    Color 09/02/2022 YELLOW/STRAW    Final    Color Reference Range: Straw, Yellow or Dark Yellow    Appearance 09/02/2022 CLEAR  CLEAR   Final    Specific gravity 09/02/2022 1.007  1.003 - 1.030   Final    pH (UA) 09/02/2022 5.5  5.0 - 8.0   Final    Protein 09/02/2022 Negative  Negative mg/dL Final    Glucose 09/02/2022 Negative  Negative mg/dL Final    Ketone 09/02/2022 Negative  Negative mg/dL Final    Bilirubin 09/02/2022 Negative  Negative   Final    Blood 09/02/2022 Negative  Negative   Final    Urobilinogen 09/02/2022 0.2  0.2 - 1.0 EU/dL Final    Nitrites 09/02/2022 Negative  Negative   Final    Leukocyte Esterase 09/02/2022 TRACE (A)  Negative   Final    WBC 09/02/2022 0-4  0 - 4 /hpf Final    RBC 09/02/2022 0-5  0 - 5 /hpf Final    Epithelial cells 09/02/2022 FEW  FEW /lpf Final    Bacteria 09/02/2022 Negative  Negative /hpf Final    Special Requests: 09/02/2022 NO SPECIAL REQUESTS    Final    Culture result: 09/02/2022 No growth (<1,000 CFU/ML)    Final         Objective:       Exam:  Visit Vitals  /60   Pulse (!) 53   Ht 5' 5\" (1.651 m)   SpO2 96%   BMI 23.63 kg/m²     Gen: Awake, alert, follows commands  Appropriate appearance, normal speech. Oriented to all spheres. No visual field defect on confrontation exam.  Full eyes movement, with no nystagmus, no diplopia, no ptosis. Normal gag and swallow.   All remaining cranial nerves were normal  Motor function: 5/5 in all extremities  Sensory: intact to LT, PP and JPS  DTRs ++ in all extremities, (-) Babinski  Good FTN and HTS   Gait: Normal    Assessment:       ICD-10-CM ICD-9-CM    1. Chronic left-sided low back pain, unspecified whether sciatica present  M54.50 724.2     G89.29 338.29       2. Cognitive impairment  R41.89 294.9       3. Paresthesia  R20.2 782.0             MRI brain (8/4/22): 1. A 1.8 x 1.5 x 2.0 cm avidly enhancing mass in the region of the pineal gland. Mild mass effect on the superior tectum, without parenchymal edema. Given the presence of a small 6 mm meningioma at the left superior frontal convexity, this is suspicious for a meningioma in the region of the pineal gland. However,  differential considerations are broad, including pineal gland neoplasms (either benign or malignant), lymphoma, and metastasis among other considerations. MRI lumbar spine: Mild superior endplate compression deformities at T11 and L1. Less than 50% loss vertebral body height. No cortical retropulsion or epidural hematomas are  identified. These fractures are amenable to percutaneous kyphoplasty for pain palliation. Plan:   Discussed MRI lumbar spine results which can explain her lower back pain. Advise to talk to neurosurgeon Dr Kenton Iniguez regarding surgical option  2. Still waiting to get EMG/NCS, will just do L UE for CTS since we already know what is going on with her back  3. Follow up with neurosurgeon Dr Kenton Iniguez who plans to monitor mass with repeat MRI brain in Nov.  4. Patient is getting physical therapy with minimal benefit. 5. Patient wants her memory to be tested. Awiating to be seen by Dr Gelacio Wilkerson for neuropsychological testing         Follow-up and Dispositions    Return for above testing.                Vanda Sauceda MD  Diplomate, American Board of Psychiatry and Neurology  Diplomate, Neuromuscular Medicine  Diplomate, American Board of Electrodiagnostic Medicine

## 2022-10-06 ENCOUNTER — OFFICE VISIT (OUTPATIENT)
Dept: NEUROLOGY | Age: 67
End: 2022-10-06
Payer: MEDICARE

## 2022-10-06 VITALS
DIASTOLIC BLOOD PRESSURE: 60 MMHG | HEIGHT: 65 IN | HEART RATE: 53 BPM | SYSTOLIC BLOOD PRESSURE: 110 MMHG | BODY MASS INDEX: 23.63 KG/M2 | OXYGEN SATURATION: 96 %

## 2022-10-06 DIAGNOSIS — M54.50 CHRONIC LEFT-SIDED LOW BACK PAIN, UNSPECIFIED WHETHER SCIATICA PRESENT: Primary | ICD-10-CM

## 2022-10-06 DIAGNOSIS — R20.2 PARESTHESIA: ICD-10-CM

## 2022-10-06 DIAGNOSIS — R41.89 COGNITIVE IMPAIRMENT: ICD-10-CM

## 2022-10-06 DIAGNOSIS — G89.29 CHRONIC LEFT-SIDED LOW BACK PAIN, UNSPECIFIED WHETHER SCIATICA PRESENT: Primary | ICD-10-CM

## 2022-10-06 PROCEDURE — G8427 DOCREV CUR MEDS BY ELIG CLIN: HCPCS | Performed by: PSYCHIATRY & NEUROLOGY

## 2022-10-06 PROCEDURE — G8752 SYS BP LESS 140: HCPCS | Performed by: PSYCHIATRY & NEUROLOGY

## 2022-10-06 PROCEDURE — 1101F PT FALLS ASSESS-DOCD LE1/YR: CPT | Performed by: PSYCHIATRY & NEUROLOGY

## 2022-10-06 PROCEDURE — 3017F COLORECTAL CA SCREEN DOC REV: CPT | Performed by: PSYCHIATRY & NEUROLOGY

## 2022-10-06 PROCEDURE — G8754 DIAS BP LESS 90: HCPCS | Performed by: PSYCHIATRY & NEUROLOGY

## 2022-10-06 PROCEDURE — 1123F ACP DISCUSS/DSCN MKR DOCD: CPT | Performed by: PSYCHIATRY & NEUROLOGY

## 2022-10-06 PROCEDURE — G8536 NO DOC ELDER MAL SCRN: HCPCS | Performed by: PSYCHIATRY & NEUROLOGY

## 2022-10-06 PROCEDURE — 1090F PRES/ABSN URINE INCON ASSESS: CPT | Performed by: PSYCHIATRY & NEUROLOGY

## 2022-10-06 PROCEDURE — G8420 CALC BMI NORM PARAMETERS: HCPCS | Performed by: PSYCHIATRY & NEUROLOGY

## 2022-10-06 PROCEDURE — G9899 SCRN MAM PERF RSLTS DOC: HCPCS | Performed by: PSYCHIATRY & NEUROLOGY

## 2022-10-06 PROCEDURE — G8399 PT W/DXA RESULTS DOCUMENT: HCPCS | Performed by: PSYCHIATRY & NEUROLOGY

## 2022-10-06 PROCEDURE — G9717 DOC PT DX DEP/BP F/U NT REQ: HCPCS | Performed by: PSYCHIATRY & NEUROLOGY

## 2022-10-06 PROCEDURE — 99215 OFFICE O/P EST HI 40 MIN: CPT | Performed by: PSYCHIATRY & NEUROLOGY

## 2022-10-06 NOTE — LETTER
10/6/2022    Patient: Maite Garcia   YOB: 1955   Date of Visit: 10/6/2022     Clarisa Steen, [address] [place] [address] [place]    Dear Clarisa Steen MD,      Thank you for referring Ms. Maite Garcia to [facility] for evaluation. My notes for this consultation are attached. If you have questions, please do not hesitate to call me. I look forward to following your patient along with you.       Sincerely,    Ted Mejias MD

## 2022-10-18 ENCOUNTER — TRANSCRIBE ORDER (OUTPATIENT)
Dept: SCHEDULING | Age: 67
End: 2022-10-18

## 2022-10-18 DIAGNOSIS — S22.000A COMPRESSION FX, THORACIC SPINE (HCC): Primary | ICD-10-CM

## 2022-10-19 ENCOUNTER — OFFICE VISIT (OUTPATIENT)
Dept: FAMILY MEDICINE CLINIC | Age: 67
End: 2022-10-19
Payer: MEDICARE

## 2022-10-19 VITALS
WEIGHT: 146 LBS | OXYGEN SATURATION: 95 % | SYSTOLIC BLOOD PRESSURE: 104 MMHG | HEIGHT: 65 IN | TEMPERATURE: 97.5 F | HEART RATE: 59 BPM | DIASTOLIC BLOOD PRESSURE: 70 MMHG | BODY MASS INDEX: 24.32 KG/M2

## 2022-10-19 DIAGNOSIS — M85.9 DISORDER OF BONE DENSITY AND STRUCTURE, UNSPECIFIED: ICD-10-CM

## 2022-10-19 DIAGNOSIS — S32.010D COMPRESSION FRACTURE OF FIRST LUMBAR VERTEBRA WITH ROUTINE HEALING: ICD-10-CM
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
## 2022-10-19 DIAGNOSIS — S22.000A COMPRESSION FRACTURE OF THORACIC VERTEBRA, INITIAL ENCOUNTER (HCC): ICD-10-CM

## 2022-10-19 DIAGNOSIS — M85.89 OSTEOPENIA OF MULTIPLE SITES: Primary | ICD-10-CM

## 2022-10-19 PROCEDURE — G8754 DIAS BP LESS 90: HCPCS | Performed by: FAMILY MEDICINE

## 2022-10-19 PROCEDURE — G8427 DOCREV CUR MEDS BY ELIG CLIN: HCPCS | Performed by: FAMILY MEDICINE

## 2022-10-19 PROCEDURE — 1090F PRES/ABSN URINE INCON ASSESS: CPT | Performed by: FAMILY MEDICINE

## 2022-10-19 PROCEDURE — G9899 SCRN MAM PERF RSLTS DOC: HCPCS | Performed by: FAMILY MEDICINE

## 2022-10-19 PROCEDURE — G9717 DOC PT DX DEP/BP F/U NT REQ: HCPCS | Performed by: FAMILY MEDICINE

## 2022-10-19 PROCEDURE — G8399 PT W/DXA RESULTS DOCUMENT: HCPCS | Performed by: FAMILY MEDICINE

## 2022-10-19 PROCEDURE — G8752 SYS BP LESS 140: HCPCS | Performed by: FAMILY MEDICINE

## 2022-10-19 PROCEDURE — 3017F COLORECTAL CA SCREEN DOC REV: CPT | Performed by: FAMILY MEDICINE

## 2022-10-19 PROCEDURE — G8420 CALC BMI NORM PARAMETERS: HCPCS | Performed by: FAMILY MEDICINE

## 2022-10-19 PROCEDURE — 1123F ACP DISCUSS/DSCN MKR DOCD: CPT | Performed by: FAMILY MEDICINE

## 2022-10-19 PROCEDURE — 99213 OFFICE O/P EST LOW 20 MIN: CPT | Performed by: FAMILY MEDICINE

## 2022-10-19 PROCEDURE — 1101F PT FALLS ASSESS-DOCD LE1/YR: CPT | Performed by: FAMILY MEDICINE

## 2022-10-19 PROCEDURE — G8536 NO DOC ELDER MAL SCRN: HCPCS | Performed by: FAMILY MEDICINE

## 2022-10-19 RX ORDER — ALENDRONATE SODIUM 70 MG/1
70 TABLET ORAL
Qty: 12 TABLET | Refills: 4 | Status: SHIPPED | OUTPATIENT
Start: 2022-10-19

## 2022-10-19 RX ORDER — MULTIVITAMIN
1 TABLET ORAL 2 TIMES DAILY
COMMUNITY

## 2022-10-19 RX ORDER — IBUPROFEN 600 MG/1
TABLET ORAL
Qty: 30 TABLET | Refills: 2 | Status: SHIPPED
Start: 2022-10-19 | End: 2022-10-31 | Stop reason: ALTCHOICE

## 2022-10-19 NOTE — PROGRESS NOTES
Identified pt with two pt identifiers. Reviewed record in preparation for visit and have obtained necessary documentation. All patient medications has been reviewed. Chief Complaint   Patient presents with    Follow-up    Results     dexa     Additional information about chief complaint:    Visit Vitals  /70 (BP 1 Location: Left upper arm, BP Patient Position: Sitting, BP Cuff Size: Adult)   Pulse (!) 59   Temp 97.5 °F (36.4 °C) (Temporal)   Ht 5' 5\" (1.651 m)   Wt 146 lb (66.2 kg)   SpO2 95%   BMI 24.30 kg/m²       Health Maintenance Due   Topic    Shingrix Vaccine Age 50> (1 of 2)    Colorectal Cancer Screening Combo     Cervical cancer screen     COVID-19 Vaccine (3 - Booster for Pfizer series)    Pneumococcal 65+ years (1 - PCV)    Medicare Yearly Exam     Flu Vaccine (1)       1. Have you been to the ER, urgent care clinic since your last visit? Hospitalized since your last visit? No    2. Have you seen or consulted any other health care providers outside of the [address] [place] [address] since your last visit? Include any pap smears or colon screening.  No

## 2022-10-19 NOTE — PROGRESS NOTES
HISTORY OF PRESENT ILLNESS  Colletta Rings is a 73 y.o. female. Patient presents with: Follow-up  Results: dexa    It read: This patient is osteopenic using the World Health Organization criteria  As compared to the prior study, there has been a decrease in the bone mineral  density of the lumbar spine of 2.3%, of the left total hip of 2.5%, and of the  right distal third radius of 4%. 10 year probability of major osteoporotic fracture: 8.9%. 10 year probability of hip fracture: 1.1%. She was recently diagnosed with compression fractures at T11 and L1 and will be having kyphoplasty. They are painful. She was on Fosamax for years and tolerated it. Also, she gets little calcium in her diet and little physical activity. She takes a multivitamin daily. She has an appointment with a provider about her balance coming up. Her daughter is with her. Review of Systems   Musculoskeletal:  Positive for back pain. Visit Vitals  /70 (BP 1 Location: Left upper arm, BP Patient Position: Sitting, BP Cuff Size: Adult)   Pulse (!) 59   Temp 97.5 °F (36.4 °C) (Temporal)   Ht 5' 5\" (1.651 m)   Wt 146 lb (66.2 kg)   LMP 01/01/2009   SpO2 95%   BMI 24.30 kg/m²     Physical Exam    ASSESSMENT and PLAN    ICD-10-CM ICD-9-CM    1. Osteopenia of multiple sites  M85.89 733.90 alendronate (FOSAMAX) 70 mg tablet      calcium-cholecalciferol, D3, (CALTRATE 600+D) tablet      VITAMIN D, 25 HYDROXY      VITAMIN D, 25 HYDROXY      2. Compression fracture of thoracic vertebra, initial encounter ([facility])  S22.000A 805.2 alendronate (FOSAMAX) 70 mg tablet      VITAMIN D, 25 HYDROXY      VITAMIN D, 25 HYDROXY      ibuprofen (MOTRIN) 600 mg tablet      3. Compression fracture of first lumbar vertebra with routine healing  S32.010D V54.17 alendronate (FOSAMAX) 70 mg tablet      VITAMIN D, 25 HYDROXY      VITAMIN D, 25 HYDROXY      ibuprofen (MOTRIN) 600 mg tablet      4. Fall, initial encounter  [address] XXXA E888.9       5. Disorder of bone density and structure, unspecified   M85.9 733.90 VITAMIN D, 25 HYDROXY      VITAMIN D, 25 HYDROXY          Option of restarting Fosamax discussed and she is agreeable to this  Heat, Motrin prn  Start Fosamax  Calcium with D  Regular weight bearing exercise once healed  Agree with appointment for balance  Check vitamin D  Bone density in 2 years    Follow-up and Dispositions    Return if symptoms worsen or fail to improve. Reviewed plan of care. Patient has provided input and agrees with goals.

## 2022-10-22 DIAGNOSIS — G25.0 ESSENTIAL TREMOR: ICD-10-CM

## 2022-10-23 RX ORDER — PROPRANOLOL HYDROCHLORIDE 60 MG/1
CAPSULE, EXTENDED RELEASE ORAL
Qty: 90 CAPSULE | Refills: 3 | Status: SHIPPED | OUTPATIENT
Start: 2022-10-23

## 2022-10-24 ENCOUNTER — TELEPHONE (OUTPATIENT)
Dept: NEUROLOGY | Age: 67
End: 2022-10-24

## 2022-10-25 ENCOUNTER — HOSPITAL ENCOUNTER (OUTPATIENT)
Dept: INTERVENTIONAL RADIOLOGY/VASCULAR | Age: 67
Discharge: HOME | End: 2022-10-25
Attending: NEUROLOGICAL SURGERY | Admitting: STUDENT IN AN ORGANIZED HEALTH CARE EDUCATION/TRAINING PROGRAM
Payer: MEDICARE

## 2022-10-25 VITALS
OXYGEN SATURATION: 91 % | BODY MASS INDEX: 21.63 KG/M2 | DIASTOLIC BLOOD PRESSURE: 64 MMHG | SYSTOLIC BLOOD PRESSURE: 96 MMHG | WEIGHT: 129.8 LBS | RESPIRATION RATE: 22 BRPM | HEART RATE: 66 BPM | HEIGHT: 65 IN | TEMPERATURE: 98.5 F

## 2022-10-25 DIAGNOSIS — S22.000A COMPRESSION FX, THORACIC SPINE (HCC): ICD-10-CM

## 2022-10-25 LAB — 25(OH)D3+25(OH)D2 SERPL-MCNC: 28.4 NG/ML (ref 30–100)

## 2022-10-25 PROCEDURE — 99153 MOD SED SAME PHYS/QHP EA: CPT

## 2022-10-25 PROCEDURE — 74011000250 HC RX REV CODE- 250: Performed by: STUDENT IN AN ORGANIZED HEALTH CARE EDUCATION/TRAINING PROGRAM

## 2022-10-25 PROCEDURE — C1713 ANCHOR/SCREW BN/BN,TIS/BN: HCPCS

## 2022-10-25 PROCEDURE — 77002 NEEDLE LOCALIZATION BY XRAY: CPT

## 2022-10-25 PROCEDURE — 99152 MOD SED SAME PHYS/QHP 5/>YRS: CPT

## 2022-10-25 PROCEDURE — 2709999900 HC NON-CHARGEABLE SUPPLY

## 2022-10-25 PROCEDURE — 74011000636 HC RX REV CODE- 636: Performed by: STUDENT IN AN ORGANIZED HEALTH CARE EDUCATION/TRAINING PROGRAM

## 2022-10-25 PROCEDURE — 77030034843 HC TAMP SPN BN INFL EXP II KYPH -H

## 2022-10-25 PROCEDURE — 74011250636 HC RX REV CODE- 250/636: Performed by: STUDENT IN AN ORGANIZED HEALTH CARE EDUCATION/TRAINING PROGRAM

## 2022-10-25 PROCEDURE — 77030040175 HC TAMP SPN BN INFLT KYPH MEDT -I1

## 2022-10-25 PROCEDURE — 77030003666 HC NDL SPINAL BD -A

## 2022-10-25 RX ORDER — MIDAZOLAM HYDROCHLORIDE 1 MG/ML
.5-1 INJECTION, SOLUTION INTRAMUSCULAR; INTRAVENOUS
Status: DISCONTINUED | OUTPATIENT
Start: 2022-10-25 | End: 2022-10-25 | Stop reason: HOSPADM

## 2022-10-25 RX ORDER — BUPIVACAINE HYDROCHLORIDE 5 MG/ML
5-10 INJECTION, SOLUTION EPIDURAL; INTRACAUDAL ONCE
Status: DISCONTINUED | OUTPATIENT
Start: 2022-10-25 | End: 2022-10-25

## 2022-10-25 RX ORDER — DIPHENHYDRAMINE HYDROCHLORIDE 50 MG/ML
50 INJECTION, SOLUTION INTRAMUSCULAR; INTRAVENOUS ONCE
Status: COMPLETED | OUTPATIENT
Start: 2022-10-25 | End: 2022-10-25

## 2022-10-25 RX ORDER — FENTANYL CITRATE 50 UG/ML
25-200 INJECTION, SOLUTION INTRAMUSCULAR; INTRAVENOUS
Status: DISCONTINUED | OUTPATIENT
Start: 2022-10-25 | End: 2022-10-25 | Stop reason: HOSPADM

## 2022-10-25 RX ORDER — LIDOCAINE HYDROCHLORIDE 10 MG/ML
8 INJECTION, SOLUTION EPIDURAL; INFILTRATION; INTRACAUDAL; PERINEURAL
Status: DISCONTINUED | OUTPATIENT
Start: 2022-10-25 | End: 2022-10-25 | Stop reason: HOSPADM

## 2022-10-25 RX ORDER — BUPIVACAINE HYDROCHLORIDE 7.5 MG/ML
10-15 INJECTION, SOLUTION EPIDURAL; RETROBULBAR ONCE
Status: COMPLETED | OUTPATIENT
Start: 2022-10-25 | End: 2022-10-25

## 2022-10-25 RX ADMIN — MIDAZOLAM 2 MG: 1 INJECTION INTRAMUSCULAR; INTRAVENOUS at 11:35

## 2022-10-25 RX ADMIN — FENTANYL CITRATE 50 MCG: 50 INJECTION, SOLUTION INTRAMUSCULAR; INTRAVENOUS at 11:33

## 2022-10-25 RX ADMIN — FENTANYL CITRATE 50 MCG: 50 INJECTION, SOLUTION INTRAMUSCULAR; INTRAVENOUS at 11:10

## 2022-10-25 RX ADMIN — DIPHENHYDRAMINE HYDROCHLORIDE 50 MG: 50 INJECTION, SOLUTION INTRAMUSCULAR; INTRAVENOUS at 11:53

## 2022-10-25 RX ADMIN — CEFAZOLIN 2 G: 1 INJECTION, POWDER, FOR SOLUTION INTRAMUSCULAR; INTRAVENOUS at 10:31

## 2022-10-25 RX ADMIN — BUPIVACAINE HYDROCHLORIDE 75 MG: 7.5 INJECTION, SOLUTION EPIDURAL; RETROBULBAR at 11:45

## 2022-10-25 RX ADMIN — FENTANYL CITRATE 25 MCG: 50 INJECTION, SOLUTION INTRAMUSCULAR; INTRAVENOUS at 11:47

## 2022-10-25 RX ADMIN — MIDAZOLAM 2 MG: 1 INJECTION INTRAMUSCULAR; INTRAVENOUS at 11:33

## 2022-10-25 RX ADMIN — LIDOCAINE HYDROCHLORIDE 15 ML: 10 INJECTION, SOLUTION EPIDURAL; INFILTRATION; INTRACAUDAL; PERINEURAL at 11:33

## 2022-10-25 RX ADMIN — MIDAZOLAM 1 MG: 1 INJECTION INTRAMUSCULAR; INTRAVENOUS at 11:41

## 2022-10-25 RX ADMIN — FENTANYL CITRATE 50 MCG: 50 INJECTION, SOLUTION INTRAMUSCULAR; INTRAVENOUS at 11:38

## 2022-10-25 RX ADMIN — MIDAZOLAM 2 MG: 1 INJECTION INTRAMUSCULAR; INTRAVENOUS at 11:47

## 2022-10-25 RX ADMIN — FENTANYL CITRATE 25 MCG: 50 INJECTION, SOLUTION INTRAMUSCULAR; INTRAVENOUS at 11:45

## 2022-10-25 RX ADMIN — IOPAMIDOL 30 ML: 408 INJECTION, SOLUTION INTRATHECAL at 11:12

## 2022-10-25 RX ADMIN — MIDAZOLAM 2 MG: 1 INJECTION INTRAMUSCULAR; INTRAVENOUS at 11:38

## 2022-10-25 RX ADMIN — MIDAZOLAM 1 MG: 1 INJECTION INTRAMUSCULAR; INTRAVENOUS at 11:45

## 2022-10-25 NOTE — H&P
INTERVENTIONAL RADIOLOGY  Preoperative History and Physical      Patient:  Sary Coronel  :  1955  Age:  73 y.o. MRN:  [id number]  Today's Date:  10/25/2022      CC / HPI   Sary Coronel is a 73 y.o. female with a history of acute T11 and L1 vertebral compression fractures who presents for Kyphoplasty repair of T11 and L1 vertebral compression fractures.      PAST MEDICAL HISTORY  Past Medical History:   Diagnosis Date    Allergic conjunctivitis of both eyes 10/19/2015    Brain tumor [facility])     Depression 2014    Essential hypertension 12/15/2017    Hot flashes 2014    Hot flashes     Hypercholesterolemia     Osteoarthritis of right knee 2014    Osteoarthritis of right knee 2014       PAST SURGICAL HISTORY  Past Surgical History:   Procedure Laterality Date    ENDOSCOPY, COLON, DIAGNOSTIC      2 /10 normal       SOCIAL HISTORY  Social History     Socioeconomic History    Marital status:      Spouse name: Not on file    Number of children: Not on file    Years of education: Not on file    Highest education level: Not on file   Occupational History    Not on file   Tobacco Use    Smoking status: Former     Types: Cigarettes    Smokeless tobacco: Never    Tobacco comments:     for only a couple of years in her late teens   Vaping Use    Vaping Use: Never used   Substance and Sexual Activity    Alcohol use: Yes     Comment: 2 glasses of wine, 2-3 x a week    Drug use: No    Sexual activity: Yes     Partners: Female   Other Topics Concern    Not on file   Social History Narrative    Not on file     Social Determinants of Health     Financial Resource Strain: Not on file   Food Insecurity: Not on file   Transportation Needs: Not on file   Physical Activity: Not on file   Stress: Not on file   Social Connections: Not on file   Intimate Partner Violence: Not on file   Housing Stability: Not on file       FAMILY HISTORY  Family History   Problem Relation Age of Onset Elevated Lipids Mother     Elevated Lipids Father     Hypertension Father     Elevated Lipids Sister        CURRENT MEDICATIONS  Current Facility-Administered Medications   Medication Dose Route Frequency Provider Last Rate Last Admin    fentaNYL citrate (PF) injection  mcg   mcg IntraVENous Multiple Red Cantor MD        midazolam (VERSED) injection 0.5-10 mg  0.5-10 mg IntraVENous Multiple Red Cantor MD        lidocaine (PF) (XYLOCAINE) 10 mg/mL (1 %) injection 8 mL  8 mL SubCUTAneous Multiple Bert Ferro MD        iopamidoL (ISOVUE-M 200) 41 % contrast solution 30 mL  30 mL IntraSPINal RAD ONCE Bert Ferro MD        bupivacaine (PF) (MARCAINE) 0.75 % (7.5 mg/mL) injection .5 mg  10-15 mL Epidural ONCE Bert Ferro MD           ALLERGIES  Allergies   Allergen Reactions    Effexor [Venlafaxine] Other (comments)     jittery       DIAGNOSTIC STUDIES   IMAGING STUDIES  Relevant Imaging studies reviewed  MRI Results (most recent):  Results from Hospital Encounter encounter on 09/28/22    MRI LUMB SPINE WO CONT    Narrative  EXAM: MRI LUMB SPINE WO CONT  Clinical history: Lumbar radiculopathy  INDICATION: Lumbar radiculopathy. Low back pain, unspecified    COMPARISON: None    TECHNIQUE: MR imaging of the lumbar spine was performed using the following  sequences: sagittal T1, T2, STIR;  axial T1, T2.    CONTRAST:  None. FINDINGS:  There is superior endplate compression L1. Marrow signal change at T11 as well. Mild superior compression fracture at T11. Likely Schmorl's node at L2 and  likely Schmorl's node at L4. There is no focal marrow lesion. The conus  medullaris terminates at L2. . Minimal retrolisthesis of L4 on L5. Abdominal  aorta is normal in caliber. Proximal common iliac vessels are normal in caliber. The paraspinal soft tissues are within normal limits. Lower thoracic spine: No herniation or stenosis. L1-L2: No herniation or stenosis.     L2-L3: Disc desiccation. Disc bulge. Loss of disc height. Mild facet  arthropathy. Canal and foramina are patent. L3-L4: Disc desiccation. Minimal disc bulge. Minimal facet arthropathy. Canal is  patent. Foramina are patent. L4-L5: Disc desiccation. Minimal disc bulge/osteophyte extends the foramina. Canal is patent. Foramina are patent    L5-S1: Minimal facet arthropathy. Minimal disc bulge. Canal and foramina are  patent. Impression  Mild superior endplate compression deformities at T11 and L1. Less than 50% loss  vertebral body height. No cortical retropulsion or epidural hematomas are  identified. These fractures are amenable to percutaneous kyphoplasty for pain palliation. Multilevel minimal disc and facet degenerative change. There is no significant  canal or foraminal compromise identified.         LABS  Lab Results   Component Value Date/Time    WBC 5.3 02/19/2019 11:46 AM    HGB 14.0 02/19/2019 11:46 AM    HCT 42.2 02/19/2019 11:46 AM    PLATELET 955 90/65/8285 11:46 AM    MCV 98 (H) 02/19/2019 11:46 AM     Lab Results   Component Value Date/Time    Sodium 139 04/27/2022 11:40 AM    Potassium 3.8 04/27/2022 11:40 AM    Chloride 104 04/27/2022 11:40 AM    CO2 28 04/27/2022 11:40 AM    Anion gap 7 04/27/2022 11:40 AM    Glucose 102 (H) 04/27/2022 11:40 AM    BUN 7 04/27/2022 11:40 AM    Creatinine 0.57 04/27/2022 11:40 AM    BUN/Creatinine ratio 12 04/27/2022 11:40 AM    GFR est AA >60 04/27/2022 11:40 AM    GFR est non-AA >60 04/27/2022 11:40 AM    Calcium 9.0 04/27/2022 11:40 AM     No results found for: INR, PTMR, PTP, PT1, PT2, INREXT    PHYSICAL EXAM   /76 (BP 1 Location: Left upper arm, BP Patient Position: At rest)   Pulse 60   Temp 98.5 °F (36.9 °C)   Resp 22   Ht 5' 5\" (1.651 m)   Wt 58.9 kg (129 lb 12.8 oz)   LMP 01/01/2009   SpO2 96%   Breastfeeding No   BMI 21.60 kg/m²   General:  NAD  Heart:  RRR  Lungs:  NWOB  Neurological:  AAOX3    PLAN   Procedure to be performed: Kyphoplasty repair of T11 and L1 compression fractures  Plan for sedation:  moderate  Post procedure plan:  observation per protocol  Informed consent:  risks, benefits, and alternatives reviewed with the patient / family who agree to proceed  Code status:  No Order      Mariah Eubanks, [address] [facility]

## 2022-10-25 NOTE — PROGRESS NOTES
TRANSFER - OUT REPORT:    Verbal report given to Irlanda(name) on Stacy Mendoza being transferred to [facility](unit) for routine progression of care       Report consisted of patient's Situation, Background, Assessment and   Recommendations(SBAR). Information from the following report(s) Procedure Summary was reviewed with the receiving nurse. Opportunity for questions and clarification was provided.       Patient transported with:   Registered Nurse

## 2022-10-25 NOTE — PROGRESS NOTES
TRANSFER - IN REPORT:    Verbal report received from Irlanda(name) on Georgina Perfect  being received from [facility](unit) for ordered procedure      Report consisted of patients Situation, Background, Assessment and   Recommendations(SBAR). Information from the following report(s) SBAR was reviewed with the receiving nurse. Opportunity for questions and clarification was provided. Assessment completed upon patients arrival to unit and care assumed.

## 2022-10-25 NOTE — DISCHARGE INSTRUCTIONS
Kyphoplasty: What to Expect at [address] [place] [address]  After kyphoplasty to relieve pain from compression fractures, your back may feel sore where the hollow needle (trocar) went into your back. This should go away in a few days. Most people are able to return to their daily activities within a day. This care sheet gives you a general idea about how long it will take for you to recover. But each person recovers at a different pace. Follow the steps below to get better as quickly as possible. How can you care for yourself at home? Activity    Take it easy for the first 24 hours. Rest when you feel tired. Getting enough sleep will help you recover. For the first day after the procedure, avoid lifting anything that would make you strain. This may include heavy grocery bags and milk containers, a heavy briefcase or backpack, cat litter or dog food bags, a vacuum , or a child. Diet    You can eat your normal diet. If your stomach is upset, try bland, low-fat foods like plain rice, broiled chicken, toast, and yogurt. Medicines    Your doctor will tell you if and when you can restart your medicines. You will also get instructions about taking any new medicines. If you stopped taking aspirin or some other blood thinner, your doctor will tell you when to start taking it again. Be safe with medicines. Take pain medicines exactly as directed. If the doctor gave you a prescription medicine for pain, take it as prescribed. If you are not taking a prescription pain medicine, ask your doctor if you can take an over-the-counter medicine. Do not take two or more pain medicines at the same time unless your doctor told you to. Many pain medicines have acetaminophen, which is Tylenol. Too much acetaminophen (Tylenol) can be harmful. Incision care    You will have a dressing over the cut (incision). A dressing helps the incision heal and protects it. Your doctor will tell you how to take care of this. Leave dressing on for 24 hours. Ice    If you are sore where the needle was inserted, put ice or a cold pack on your back for 10 to 20 minutes at a time. Try to do this every 1 to 2 hours for the next 3 days (when you are awake) or until the swelling goes down. Put a thin cloth between the ice and your skin. Follow-up care is a key part of your treatment and safety. Be sure to make and go to all appointments, and call your doctor if you are having problems. It's also a good idea to know your test results and keep a list of the medicines you take. When should you call for help? Call 911 anytime you think you may need emergency care. For example, call if:    You passed out (lost consciousness). You have severe trouble breathing. You have sudden chest pain and shortness of breath, or you cough up blood. You are unable to move a leg at all. Call your doctor now or seek immediate medical care if:    You have new or worse symptoms in your legs, belly, or buttocks. Symptoms may include:  Numbness or tingling. Weakness. Pain. You lose bladder or bowel control. You have signs of infection, such as: Increased pain, swelling, warmth, or redness. Red streaks leading from the incision. Pus draining from the incision. A fever. Watch closely for any changes in your health, and be sure to contact your doctor if:    You do not get better as expected. Where can you learn more? Go to http://www.gray.com/  Enter O461 in the search box to learn more about \"Kyphoplasty: What to Expect at Home. \"  Current as of: March 9, 2022               Content Version: 13.4  © 6072-5701 [employer]. Care instructions adapted under license by [employer] (which disclaims liability or warranty for this information).  If you have questions about a medical condition or this instruction, always ask your healthcare professional. Courtney Spann any warranty or liability for your use of this information.

## 2022-10-25 NOTE — ROUTINE PROCESS
9:53 AM  Patient arrived. ID and allergies verified verbally with patient. Pt voices understanding of procedure to be performed. Consent obtained. Pt prepped for procedure. 11:02 AM  TRANSFER - OUT REPORT:    Verbal report given to Chel Sams (name) on Teresa Ugalde  being transferred to [facility](unit) for ordered procedure       Report consisted of patients Situation, Background, Assessment and   Recommendations(SBAR). Information from the following report(s) SBAR was reviewed with the receiving nurse. Lines:   Peripheral IV 10/25/22 Distal;Left;Posterior Forearm (Active)   Site Assessment Clean, dry, & intact 10/25/22 1018   Phlebitis Assessment 0 10/25/22 1018   Infiltration Assessment 0 10/25/22 1018   Dressing Status Clean, dry, & intact 10/25/22 1018   Dressing Type Transparent 10/25/22 1018   Hub Color/Line Status Pink 10/25/22 1018        Opportunity for questions and clarification was provided. Patient transported with:   Tech    12:20 PM  TRANSFER - IN REPORT:    Verbal report received from [facility] RN(name) on Teresa Ugalde  being received from IR(unit) for routine post - op      Report consisted of patients Situation, Background, Assessment and   Recommendations(SBAR). Information from the following report(s) SBAR and Procedure Summary was reviewed with the receiving nurse. Opportunity for questions and clarification was provided. Assessment completed upon patients arrival to unit and care assumed. 1:45 PM  Discharge instructions reviewed with patient and family. Voiced understanding. Patient given copy of discharge instructions to take home. 2:15 PM  Patient ambulates in hallway or on unit. 2:20 PM  Pt discharged via wheelchair with son. Personal belongings with patient upon discharge.

## 2022-10-31 ENCOUNTER — TELEPHONE (OUTPATIENT)
Dept: FAMILY MEDICINE CLINIC | Age: 67
End: 2022-10-31

## 2022-10-31 ENCOUNTER — VIRTUAL VISIT (OUTPATIENT)
Dept: FAMILY MEDICINE CLINIC | Age: 67
End: 2022-10-31
Payer: MEDICARE

## 2022-10-31 DIAGNOSIS — M54.6 ACUTE LEFT-SIDED THORACIC BACK PAIN: Primary | ICD-10-CM

## 2022-10-31 PROCEDURE — 99213 OFFICE O/P EST LOW 20 MIN: CPT | Performed by: FAMILY MEDICINE

## 2022-10-31 PROCEDURE — 1123F ACP DISCUSS/DSCN MKR DOCD: CPT | Performed by: FAMILY MEDICINE

## 2022-10-31 RX ORDER — NAPROXEN 500 MG/1
500 TABLET ORAL 2 TIMES DAILY WITH MEALS
Qty: 28 TABLET | Refills: 0 | Status: SHIPPED | OUTPATIENT
Start: 2022-10-31 | End: 2022-11-14

## 2022-10-31 NOTE — TELEPHONE ENCOUNTER
Pt called to say that she had a procedure on her back and that she is a whole lot of pain still. Would like something for the pain. Please call to advise.        Mitul Ballesteros

## 2022-10-31 NOTE — PROGRESS NOTES
Chief Complaint   Patient presents with    Back Pain     acute T11 and L1 vertebral compression fractures who presents for Kyphoplasty repair of T11 and L1 vertebral compression fractures. Pt fell 06/2022, has been having back pain since this time. Had  procedure on spine, 10/25/2022, still having back pain. Unsuccessful with return call from surgeon's office. Pt scheduled her AWV with Dr. Alan Galeano for 11/15/2022.

## 2022-10-31 NOTE — PROGRESS NOTES
Cory Noble (: 1955) is a 73 y.o. female, established patient, here for evaluation of the following chief complaint(s):   Back Pain (acute T11 and L1 vertebral compression fractures who presents for Kyphoplasty repair of T11 and L1 vertebral compression fractures. ///Pt fell 2022, has been having back pain since this time. Had  procedure on spine, 10/25/2022, still having back pain. Unsuccessful with return call from surgeon's office. )       ASSESSMENT/PLAN:  Below is the assessment and plan developed based on review of pertinent history, labs, studies, and medications. 1. Acute left-sided thoracic back pain  Assessment & Plan:  Acute pain seems to be left-sided and associated with patient's self assessment of tightness and spasm of the paraspinal muscles. It is positional when stretching the muscles and not associated with neurologic of infectious signs/symptoms. We will start with increasing dosage of anti-inflammatory therapy, and utilizing more frequent topical treatments. If not better 2 weeks out from onset, will reach out for appointment in-person for clinical exam and consideration for imaging. Analgesic: naproxen 500mg BID, heat/ice, walking. Osteoporosis management: bisphosphonate and Vit D, but patient is curious about Reclast IV annual therapy as alternative to oral bisphosphonate. No follow-ups on file. SUBJECTIVE/OBJECTIVE:  Back Pain   Pertinent negatives include no chest pain, no fever, no numbness, no headaches, no dysuria, no pelvic pain and no weakness. Initially did well post-procedure, but then on day 3 post-op, but then bent over to  a 6-pack of mineral water, then experienced acute onset of sharp pain in same area as before. She can walk but definitely painful. Taking Motrin 600mg BID and it is not helping at all. Using both ice and heat.  Bending forward hurts the most, laying flat in bed seems to feel the best but she doesn't want to be in bed anymore. Supposed to be starting Fosamax 70mg q7 days. Review of Systems   Constitutional:  Negative for chills, fatigue and fever. HENT: Negative. Respiratory:  Negative for cough and shortness of breath. Cardiovascular:  Negative for chest pain and leg swelling. Gastrointestinal: Negative. Genitourinary:  Negative for dysuria, pelvic pain and urgency. Musculoskeletal:  Positive for back pain. Negative for gait problem. Skin:  Negative for rash and wound. Neurological:  Negative for dizziness, tremors, weakness, light-headedness, numbness and headaches. Hematological:  Does not bruise/bleed easily. Psychiatric/Behavioral: Negative. Patient-Reported Systolic (Top): 169  Patient-Reported Diastolic (Bottom): 70  Patient-Reported Weight: 140lbs       Physical Exam    [INSTRUCTIONS:  \"[x]\" Indicates a positive item  \"[]\" Indicates a negative item  -- DELETE ALL ITEMS NOT EXAMINED]    Constitutional: [x] Appears well-developed and well-nourished [x] No apparent distress      [] Abnormal -     Mental status: [x] Alert and awake  [x] Oriented to person/place/time [x] Able to follow commands    [] Abnormal -     Eyes:   EOM    [x]  Normal    [] Abnormal -   Sclera  [x]  Normal    [] Abnormal -          Discharge [x]  None visible   [] Abnormal -     HENT: [x] Normocephalic, atraumatic  [] Abnormal -       External Ears [x] Normal  [] Abnormal -    Neck: [x] No visualized mass [] Abnormal -     Pulmonary/Chest: [x] Respiratory effort normal   [x] No visualized signs of difficulty breathing or respiratory distress        [] Abnormal -      Musculoskeletal:   [x] Normal gait with no signs of ataxia. Walking around during part of visit.         [x] Normal range of motion of neck        [] Abnormal -     Neurological:        [x] No Facial Asymmetry (Cranial nerve 7 motor function) (limited exam due to video visit)          [x] No gaze palsy        [] Abnormal -          Skin:        [x] No significant exanthematous lesions or discoloration noted on facial skin         [] Abnormal -            Psychiatric:       [x] Normal Affect [] Abnormal -        [x] No Hallucinations    Other pertinent observable physical exam findings:- able to talk in full sentences while pacing during exam.    On this date 10/31/2022 I have spent 25 minutes reviewing previous notes, test results and face to face (virtual) with the patient discussing the diagnosis and importance of compliance with the treatment plan as well as documenting on the day of the visit. Narciso Habermann, was evaluated through a synchronous (real-time) audio-video encounter. The patient (or guardian if applicable) is aware that this is a billable service, which includes applicable co-pays. This Virtual Visit was conducted with patient's (and/or legal guardian's) consent. The visit was conducted pursuant to the emergency declaration under the [address] [place] [address], [address] [place] [address] [facility] authority and the [employer] General Act. Patient identification was verified, and a caregiver was present when appropriate. The patient was located at: Home: [address] [place] [id number]  The provider was located at: Facility (Appt Department): [address] [place] [address]. [address] [place] [address]      An electronic signature was used to authenticate this note.   -- Geraldo Moe MD

## 2022-10-31 NOTE — ASSESSMENT & PLAN NOTE
Acute pain seems to be left-sided and associated with patient's self assessment of tightness and spasm of the paraspinal muscles. It is positional when stretching the muscles and not associated with neurologic of infectious signs/symptoms. We will start with increasing dosage of anti-inflammatory therapy, and utilizing more frequent topical treatments. If not better 2 weeks out from onset, will reach out for appointment in-person for clinical exam and consideration for imaging. Analgesic: naproxen 500mg BID, heat/ice, walking. Osteoporosis management: bisphosphonate and Vit D, but patient is curious about Reclast IV annual therapy as alternative to oral bisphosphonate.

## 2022-11-06 DIAGNOSIS — E55.9 VITAMIN D DEFICIENCY: Primary | ICD-10-CM

## 2022-11-06 RX ORDER — ERGOCALCIFEROL 1.25 MG/1
50000 CAPSULE ORAL
Qty: 24 CAPSULE | Refills: 0 | Status: SHIPPED | OUTPATIENT
Start: 2022-11-07 | End: 2023-01-27

## 2022-11-15 ENCOUNTER — OFFICE VISIT (OUTPATIENT)
Dept: FAMILY MEDICINE CLINIC | Age: 67
End: 2022-11-15
Payer: MEDICARE

## 2022-11-15 VITALS
DIASTOLIC BLOOD PRESSURE: 72 MMHG | RESPIRATION RATE: 18 BRPM | HEIGHT: 65 IN | TEMPERATURE: 97.9 F | BODY MASS INDEX: 24.49 KG/M2 | WEIGHT: 147 LBS | HEART RATE: 66 BPM | OXYGEN SATURATION: 95 % | SYSTOLIC BLOOD PRESSURE: 102 MMHG

## 2022-11-15 DIAGNOSIS — M54.50 ACUTE LEFT-SIDED LOW BACK PAIN WITHOUT SCIATICA: ICD-10-CM

## 2022-11-15 DIAGNOSIS — Z00.00 MEDICARE ANNUAL WELLNESS VISIT, SUBSEQUENT: Primary | ICD-10-CM

## 2022-11-15 DIAGNOSIS — I10 ESSENTIAL HYPERTENSION: ICD-10-CM

## 2022-11-15 DIAGNOSIS — M80.00XD AGE-RELATED OSTEOPOROSIS WITH CURRENT PATHOLOGICAL FRACTURE WITH ROUTINE HEALING, SUBSEQUENT ENCOUNTER: ICD-10-CM

## 2022-11-15 PROBLEM — M54.6 ACUTE LEFT-SIDED THORACIC BACK PAIN: Status: RESOLVED | Noted: 2022-10-31 | Resolved: 2022-11-15

## 2022-11-15 PROCEDURE — G0439 PPPS, SUBSEQ VISIT: HCPCS | Performed by: FAMILY MEDICINE

## 2022-11-15 PROCEDURE — 1123F ACP DISCUSS/DSCN MKR DOCD: CPT | Performed by: FAMILY MEDICINE

## 2022-11-15 PROCEDURE — 3074F SYST BP LT 130 MM HG: CPT | Performed by: FAMILY MEDICINE

## 2022-11-15 PROCEDURE — 20552 NJX 1/MLT TRIGGER POINT 1/2: CPT | Performed by: FAMILY MEDICINE

## 2022-11-15 PROCEDURE — 99214 OFFICE O/P EST MOD 30 MIN: CPT | Performed by: FAMILY MEDICINE

## 2022-11-15 PROCEDURE — 3078F DIAST BP <80 MM HG: CPT | Performed by: FAMILY MEDICINE

## 2022-11-15 NOTE — PROGRESS NOTES
This is the Subsequent Medicare Annual Wellness Exam, performed 12 months or more after the Initial AWV or the last Subsequent AWV    I have reviewed the patient's medical history in detail and updated the computerized patient record. Assessment/Plan   Education and counseling provided:  Are appropriate based on today's review and evaluation  Pneumococcal Vaccine  Influenza Vaccine  Screening Mammography  Screening Pap and pelvic (covered once every 2 years)  Colorectal cancer screening tests  Bone mass measurement (DEXA)    1. Essential hypertension  Assessment & Plan:   at goal, changes made today: discussed trial off of one of her BP meds, amlodipine seems the best fit for this. She will trial off of it, keep track of home BPs, and have in office visit in 2 weeks to check. GOal /80  2. Age-related osteoporosis with current pathological fracture with routine healing, subsequent encounter  Assessment & Plan:  NO recent falls, patient started on vitamin D and calcium supplementation. Needing to start on additional anti-osteoporotic therapy, for which patient would prefer Prolia injections q6months instead of Fosamax, if possible. Will keep on med list for now till can secure Prolia injections, after which time can remove. 3. Acute left-sided low back pain without sciatica  Assessment & Plan:  History and exam consistent with muscle strain/spasming of L lumbar and abductor musculature. Pain  seems to be knotted muscle in particular. Amenable to trigger point injection today, with physical therapy for adjusting compensatory problems that have resulted from the compression fracture. - Verbal consent given from patient. A trigger point injection was performed at the site of maximal tenderness using 1% plain Lidocaine 2cc with 27G 1in needle in the gluteus medius muscle body along iliac crest. This was well tolerated, and followed by near complete relief of pain.   Orders:  -     REFERRAL TO PHYSICAL THERAPY     A trigger point injection was performed at the site of maximal tenderness using 1% plain Lidocaine. This was well tolerated, and followed by near complete relief of pain. Depression Risk Factor Screening     3 most recent PHQ Screens 11/15/2022   Little interest or pleasure in doing things Not at all   Feeling down, depressed, irritable, or hopeless Not at all   Total Score PHQ 2 0       Alcohol & Drug Abuse Risk Screen    Do you average more than 1 drink per night or more than 7 drinks a week:  No    On any one occasion in the past three months have you have had more than 3 drinks containing alcohol:  No          Functional Ability and Level of Safety    Hearing: Hearing is good. Activities of Daily Living: The home contains: no safety equipment. Patient does total self care      Ambulation: with no difficulty     Fall Risk:  Fall Risk Assessment, last 12 mths 11/15/2022   Able to walk? Yes   Fall in past 12 months? 1   Do you feel unsteady? -   Are you worried about falling -   Is TUG test greater than 12 seconds? -   Is the gait abnormal? -   Number of falls in past 12 months 2   Fall with injury? 1      Abuse Screen:  Patient is not abused       Cognitive Screening    Has your family/caregiver stated any concerns about your memory: yes - currently lined up for a neuropsych eval in June 2023. Cognitive Screening: decline today due to upcoming appointment.     Health Maintenance Due     Health Maintenance Due   Topic Date Due    Shingrix Vaccine Age 49> (1 of 2) Never done    Colorectal Cancer Screening Combo  02/26/2020    Cervical cancer screen  10/19/2020    COVID-19 Vaccine (3 - Booster for Pfizer series) 06/10/2021    Pneumococcal 65+ years (1 - PCV) Never done    Medicare Yearly Exam  Never done    Flu Vaccine (1) Never done       Patient Care Team   Patient Care Team:  Yonatan Huffman MD as PCP - General (Family Medicine)  Yonatan Huffman MD as PCP - [facility]  Gerson Guerra MD (Psychiatry)  Ilan Meyers MD as Physician (Endocrinology Physician)    History     Patient Active Problem List   Diagnosis Code    Osteoporosis M81.0    Anxiety F41.9    Osteoarthritis of right knee M17.11    Hot flashes R23.2    Depression, major, recurrent, mild (HCC) F33.0    Hypercholesterolemia E78.00    Allergic conjunctivitis of both eyes H10.13    Essential hypertension I10    Acute left-sided low back pain without sciatica M54.50     Past Medical History:   Diagnosis Date    Allergic conjunctivitis of both eyes 10/19/2015    Brain tumor ([facility] [address]     Depression 09/11/2014    Essential hypertension 12/15/2017    Hot flashes 06/23/2014    Hot flashes     Hypercholesterolemia     Osteoarthritis of right knee 03/11/2014    Osteoarthritis of right knee 03/11/2014      Past Surgical History:   Procedure Laterality Date    ENDOSCOPY, COLON, DIAGNOSTIC      2 /10 normal    IR KYPHOPLASTY LUMBAR  10/25/2022     Current Outpatient Medications   Medication Sig Dispense Refill    ergocalciferol (ERGOCALCIFEROL) 1,250 mcg (50,000 unit) capsule Take 1 Capsule by mouth every Monday and Thursday for 24 doses. Take for 12 weeks 24 Capsule 0    propranolol LA (INDERAL LA) 60 mg SR capsule TAKE ONE CAPSULE BY MOUTH EVERY MORNING 90 Capsule 3    calcium-cholecalciferol, D3, (CALTRATE 600+D) tablet Take 1 Tablet by mouth two (2) times a day. pravastatin (PRAVACHOL) 40 mg tablet TAKE ONE TABLET BY MOUTH ONCE NIGHTLY 90 Tablet 2    multivitamin (ONE A DAY) tablet Take 1 Tablet by mouth in the morning. busPIRone (BUSPAR) 5 mg tablet TAKE ONE TABLET BY MOUTH TWICE A  Tablet 3    alendronate (FOSAMAX) 70 mg tablet Take 1 Tablet by mouth every seven (7) days.  As directed (Patient not taking: No sig reported) 12 Tablet 4     Allergies   Allergen Reactions    Effexor [Venlafaxine] Other (comments)     jittery       Family History   Problem Relation Age of Onset    Elevated Lipids Mother Elevated Lipids Father     Hypertension Father     Elevated Lipids Sister      Social History     Tobacco Use    Smoking status: Former     Types: Cigarettes    Smokeless tobacco: Never    Tobacco comments:     for only a couple of years in her late teens   Substance Use Topics    Alcohol use: Yes     Comment: 2 glasses of wine, 2-3 x a week         Demi Rubio MD

## 2022-11-15 NOTE — ASSESSMENT & PLAN NOTE
NO recent falls, patient started on vitamin D and calcium supplementation. Needing to start on additional anti-osteoporotic therapy, for which patient would prefer Prolia injections q6months instead of Fosamax, if possible. Will keep on med list for now till can secure Prolia injections, after which time can remove.

## 2022-11-15 NOTE — ASSESSMENT & PLAN NOTE
History and exam consistent with muscle strain/spasming of L lumbar and abductor musculature. Pain  seems to be knotted muscle in particular. Amenable to trigger point injection today, with physical therapy for adjusting compensatory problems that have resulted from the compression fracture. - Verbal consent given from patient. A trigger point injection was performed at the site of maximal tenderness using 1% plain Lidocaine 2cc with 27G 1in needle in the gluteus medius muscle body along iliac crest. This was well tolerated, and followed by near complete relief of pain.

## 2022-11-15 NOTE — PROGRESS NOTES
Narciso Habermann is a 73 y.o. female    Chief Complaint   Patient presents with    Annual Wellness Visit       Visit Vitals  /72   Pulse 66   Temp 97.9 °F (36.6 °C) (Temporal)   Resp 18   Ht 5' 5\" (1.651 m)   Wt 147 lb (66.7 kg)   LMP 01/01/2009   SpO2 95%   BMI 24.46 kg/m²       3 most recent PHQ Screens 11/15/2022   Little interest or pleasure in doing things Not at all   Feeling down, depressed, irritable, or hopeless Not at all   Total Score PHQ 2 0       Fall Risk Assessment, last 12 mths 11/15/2022   Able to walk? Yes   Fall in past 12 months? 1   Do you feel unsteady? -   Are you worried about falling -   Is TUG test greater than 12 seconds? -   Is the gait abnormal? -   Number of falls in past 12 months 2   Fall with injury? 1       Abuse Screening Questionnaire 11/15/2022   Do you ever feel afraid of your partner? N   Are you in a relationship with someone who physically or mentally threatens you? N   Is it safe for you to go home? Y       1. Have you been to the ER, urgent care clinic since your last visit? Hospitalized since your last visit? No     2. Have you seen or consulted any other health care providers outside of the [address] [place] [address] since your last visit? Include any pap smears or colon screening.  No

## 2022-11-15 NOTE — ASSESSMENT & PLAN NOTE
at goal, changes made today: discussed trial off of one of her BP meds, amlodipine seems the best fit for this. She will trial off of it, keep track of home BPs, and have in office visit in 2 weeks to check.  GOal /80

## 2022-11-15 NOTE — PATIENT INSTRUCTIONS
Medicare Wellness Visit, Female     The best way to live healthy is to have a lifestyle where you eat a well-balanced diet, exercise regularly, limit alcohol use, and quit all forms of tobacco/nicotine, if applicable. Regular preventive services are another way to keep healthy. Preventive services (vaccines, screening tests, monitoring & exams) can help personalize your care plan, which helps you manage your own care. Screening tests can find health problems at the earliest stages, when they are easiest to treat. [facility] follows the current, evidence-based guidelines published by the [facility] ([facility]) when recommending preventive services for our patients. Because we follow these guidelines, sometimes recommendations change over time as research supports it. (For example, mammograms used to be recommended annually. Even though Medicare will still pay for an annual mammogram, the newer guidelines recommend a mammogram every two years for women of average risk). Of course, you and your doctor may decide to screen more often for some diseases, based on your risk and your co-morbidities (chronic disease you are already diagnosed with). Preventive services for you include:  - Medicare offers their members a free annual wellness visit, which is time for you and your primary care provider to discuss and plan for your preventive service needs. Take advantage of this benefit every year!  -All adults over the age of 72 should receive the recommended pneumonia vaccines. Current USPSTF guidelines recommend a series of two vaccines for the best pneumonia protection.   -All adults should have a flu vaccine yearly and a tetanus vaccine every 10 years.   -All adults age 48 and older should receive the shingles vaccines (series of two vaccines).       -All adults age 38-68 who are overweight should have a diabetes screening test once every three years.   -All adults born between [birth year] and 1965 should be screened once for Hepatitis C.  -Other screening tests and preventive services for persons with diabetes include: an eye exam to screen for diabetic retinopathy, a kidney function test, a foot exam, and stricter control over your cholesterol.   -Cardiovascular screening for adults with routine risk involves an electrocardiogram (ECG) at intervals determined by your doctor.   -Colorectal cancer screenings should be done for adults age 54-65 with no increased risk factors for colorectal cancer. There are a number of acceptable methods of screening for this type of cancer. Each test has its own benefits and drawbacks. Discuss with your doctor what is most appropriate for you during your annual wellness visit. The different tests include: colonoscopy (considered the best screening method), a fecal occult blood test, a fecal DNA test, and sigmoidoscopy.    -A bone mass density test is recommended when a woman turns 65 to screen for osteoporosis. This test is only recommended one time, as a screening. Some providers will use this same test as a disease monitoring tool if you already have osteoporosis. -Breast cancer screenings are recommended every other year for women of normal risk, age 54-69.  -Cervical cancer screenings for women over age 72 are only recommended with certain risk factors.      Here is a list of your current Health Maintenance items (your personalized list of preventive services) with a due date:  Health Maintenance Due   Topic Date Due    Shingles Vaccine (1 of 2) Never done    Colorectal Screening  02/26/2020    Cervical cancer screen  10/19/2020    COVID-19 Vaccine (3 - Booster for Pfizer series) 06/10/2021    Pneumococcal Vaccine (1 - PCV) Never done    Annual Well Visit  Never done    Yearly Flu Vaccine (1) Never done

## 2022-11-15 NOTE — Clinical Note
Please set patient up for Prolia injections q6 months at infusion clinic.  Thank you.  -Dr. Abida Guy

## 2022-11-27 ENCOUNTER — PATIENT MESSAGE (OUTPATIENT)
Dept: OBGYN CLINIC | Age: 67
End: 2022-11-27

## 2022-12-01 ENCOUNTER — HOSPITAL ENCOUNTER (OUTPATIENT)
Dept: MRI IMAGING | Age: 67
Discharge: HOME | End: 2022-12-01
Attending: NEUROLOGICAL SURGERY
Payer: MEDICARE

## 2022-12-01 VITALS — WEIGHT: 147 LBS | HEIGHT: 65 IN | BODY MASS INDEX: 24.49 KG/M2

## 2022-12-01 DIAGNOSIS — M80.00XD AGE-RELATED OSTEOPOROSIS WITH CURRENT PATHOLOGICAL FRACTURE WITH ROUTINE HEALING, SUBSEQUENT ENCOUNTER: Primary | ICD-10-CM

## 2022-12-01 DIAGNOSIS — D49.7: ICD-10-CM

## 2022-12-01 PROCEDURE — A9576 INJ PROHANCE MULTIPACK: HCPCS | Performed by: RADIOLOGY

## 2022-12-01 PROCEDURE — 74011250636 HC RX REV CODE- 250/636: Performed by: RADIOLOGY

## 2022-12-01 PROCEDURE — 70553 MRI BRAIN STEM W/O & W/DYE: CPT

## 2022-12-01 RX ORDER — DIPHENHYDRAMINE HYDROCHLORIDE 50 MG/ML
25 INJECTION, SOLUTION INTRAMUSCULAR; INTRAVENOUS AS NEEDED
Start: 2022-12-07

## 2022-12-01 RX ORDER — ALBUTEROL SULFATE 0.83 MG/ML
2.5 SOLUTION RESPIRATORY (INHALATION) AS NEEDED
Start: 2022-12-07

## 2022-12-01 RX ORDER — ACETAMINOPHEN 325 MG/1
650 TABLET ORAL AS NEEDED
Start: 2022-12-07

## 2022-12-01 RX ORDER — EPINEPHRINE 1 MG/ML
0.3 INJECTION, SOLUTION, CONCENTRATE INTRAVENOUS AS NEEDED
OUTPATIENT
Start: 2022-12-07

## 2022-12-01 RX ORDER — DIPHENHYDRAMINE HYDROCHLORIDE 50 MG/ML
50 INJECTION, SOLUTION INTRAMUSCULAR; INTRAVENOUS AS NEEDED
Start: 2022-12-07

## 2022-12-01 RX ORDER — HYDROCORTISONE SODIUM SUCCINATE 100 MG/2ML
100 INJECTION, POWDER, FOR SOLUTION INTRAMUSCULAR; INTRAVENOUS AS NEEDED
OUTPATIENT
Start: 2022-12-07

## 2022-12-01 RX ORDER — ONDANSETRON 2 MG/ML
8 INJECTION INTRAMUSCULAR; INTRAVENOUS AS NEEDED
OUTPATIENT
Start: 2022-12-07

## 2022-12-01 RX ADMIN — GADOTERIDOL 13 ML: 279.3 INJECTION, SOLUTION INTRAVENOUS at 12:03

## 2022-12-02 ENCOUNTER — OFFICE VISIT (OUTPATIENT)
Dept: OBGYN CLINIC | Age: 67
End: 2022-12-02
Payer: MEDICARE

## 2022-12-02 VITALS
SYSTOLIC BLOOD PRESSURE: 98 MMHG | HEART RATE: 69 BPM | HEIGHT: 65 IN | WEIGHT: 147.4 LBS | BODY MASS INDEX: 24.56 KG/M2 | DIASTOLIC BLOOD PRESSURE: 65 MMHG

## 2022-12-02 DIAGNOSIS — R10.2 PELVIC PAIN IN FEMALE: Primary | ICD-10-CM

## 2022-12-02 DIAGNOSIS — N83.201 OVARIAN CYST, RIGHT: ICD-10-CM

## 2022-12-02 DIAGNOSIS — R10.32 LLQ PAIN: ICD-10-CM

## 2022-12-02 NOTE — PROGRESS NOTES
Rooming note, gyn problem visit:    Chief Complaint   Patient presents with    New Patient    Pelvic Pain     Jhonny Lr is a 78 y.o. female presents for a problem visit. Patient's last menstrual period was 01/01/2009. Birth Control: post menopausal status. Last Pap: 2015 WNL  Last or next WWE is: due    The patient is reporting having:  pelvic pain for the past few weeks. Pt reports her PCP told her she has fluid around her ovaries a few weeks ago. Pt denies recent ultrasound & no showed the one scheduled for today. f  This is a new problem. She has not experienced this problem before. She reports the symptoms are is unchanged. Aggravating factors include none. And alleviating factors include none. She does not have other concerns. Ultrasound:        1. Have you been to the ER, urgent care clinic, or hospitalized since your last visit? New pt    2. Have you seen or consulted any other health care providers outside of the [address] [place] [address] since your last visit?   New pt

## 2022-12-02 NOTE — Clinical Note
FYI, pt missed US today but we will RS her, imaging shows stable ov cysts x 2008, so probably just need observation. Pt waiting to hear from your office (?) re scheduling of colonoscopy.   Thanks TRP

## 2022-12-02 NOTE — PROGRESS NOTES
[facility]  [email or web address]  [phone]    Jerry Eugene MD, [address] [facility]       OB/GYN Problem visit      HPI  Nate Vickers is a , 78 y.o. female who presents for a problem visit. Chief Complaint   Patient presents with    New Patient    Pelvic Pain     History of Present Illness: The patient is reporting having LLQ for several  months. She reports the symptoms are is unchanged. Aggravating factors include none. And alleviating factors include none. +diarrhea    Colonoscopy planned    Per Rooming Note:  Last Pap:  WNL  Last or next WWE is: due     The patient is reporting having:  pelvic pain for the past few weeks. Pt reports her PCP told her she has fluid around her ovaries a few weeks ago. Pt denies recent ultrasound & no showed the one scheduled for today. f  This is a new problem. She has not experienced this problem before. She reports the symptoms are is unchanged. Aggravating factors include none. And alleviating factors include none. She does not have other concerns. Sexual history and Contraception:  Social History     Substance and Sexual Activity   Sexual Activity Yes    Partners: Female       Past Medical History:   Diagnosis Date    Allergic conjunctivitis of both eyes 10/19/2015    Brain tumor ([facility] [address]     Depression 2014    Essential hypertension 12/15/2017    Hot flashes 2014    Hot flashes     Hypercholesterolemia     Osteoarthritis of right knee 2014    Osteoarthritis of right knee 2014     Current Outpatient Medications   Medication Sig    denosumab (PROLIA) 60 mg/mL injection 60 mg by SubCUTAneous route every 6 months.    ergocalciferol (ERGOCALCIFEROL) 1,250 mcg (50,000 unit) capsule Take 1 Capsule by mouth every Monday and Thursday for 24 doses.  Take for 12 weeks    propranolol LA (INDERAL LA) 60 mg SR capsule TAKE ONE CAPSULE BY MOUTH EVERY MORNING    calcium-cholecalciferol, D3, (CALTRATE 600+D) tablet Take 1 Tablet by mouth two (2) times a day. pravastatin (PRAVACHOL) 40 mg tablet TAKE ONE TABLET BY MOUTH ONCE NIGHTLY    multivitamin (ONE A DAY) tablet Take 1 Tablet by mouth in the morning. busPIRone (BUSPAR) 5 mg tablet TAKE ONE TABLET BY MOUTH TWICE A DAY     No current facility-administered medications for this visit. OB History    Para Term  AB Living   2 2 2         SAB IAB Ectopic Molar Multiple Live Births             2      # Outcome Date GA Lbr Patrick/2nd Weight Sex Delivery Anes PTL Lv   2 Term            1 Term              Past Surgical History:   Procedure Laterality Date    ENDOSCOPY, COLON, DIAGNOSTIC      2 /10 normal    IR KYPHOPLASTY LUMBAR  10/25/2022     Family History   Problem Relation Age of Onset    Elevated Lipids Mother     Elevated Lipids Father     Hypertension Father     Elevated Lipids Sister      Social History     Socioeconomic History    Marital status:      Spouse name: Not on file    Number of children: Not on file    Years of education: Not on file    Highest education level: Not on file   Occupational History    Not on file   Tobacco Use    Smoking status: Former     Types: Cigarettes    Smokeless tobacco: Never    Tobacco comments:     for only a couple of years in her late teens   Vaping Use    Vaping Use: Never used   Substance and Sexual Activity    Alcohol use: Yes     Comment: 2 glasses of wine, 2-3 x a week    Drug use: No    Sexual activity: Yes     Partners: Female   Other Topics Concern    Not on file   Social History Narrative    Not on file     Social Determinants of Health     Financial Resource Strain: Not on file   Food Insecurity: Not on file   Transportation Needs: Not on file   Physical Activity: Not on file   Stress: Not on file   Social Connections: Not on file   Intimate Partner Violence: Not on file   Housing Stability: Not on file     Tobacco History:  reports that she has quit smoking. Her smoking use included cigarettes. She has never used smokeless tobacco.  Alcohol Abuse:  reports current alcohol use. Drug Abuse:  reports no history of drug use. Allergies   Allergen Reactions    Effexor [Venlafaxine] Other (comments)     jittery       Patient Active Problem List   Diagnosis Code    Osteoporosis M81.0    Anxiety F41.9    Osteoarthritis of right knee M17.11    Hot flashes R23.2    Depression, major, recurrent, mild (HCC) F33.0    Hypercholesterolemia E78.00    Allergic conjunctivitis of both eyes H10.13    Essential hypertension I10    Acute left-sided low back pain without sciatica M54.50             Review of Systems: History obtained from the patient  Constitutional: negative for weight loss, fever, night sweats  Breast: negative for breast lumps, nipple discharge, galactorrhea  GI: + diarrhea due for colonoscopy  : see HPI   MSK: negative for back pain, joint pain, muscle pain  Skin: negative for itching, rash, hives  Psych: negative for anxiety, depression, change in mood      Objective:  Visit Vitals  BP 98/65   Pulse 69   Ht 5' 5\" (1.651 m)   Wt 147 lb 6.4 oz (66.9 kg)   LMP 01/01/2009   BMI 24.53 kg/m²       PHYSICAL EXAMINATION:  GENERAL: alert, well appearing, and in no distress  HEAD: normocephalic, atraumatic. ABDOMEN: soft, nontender, nondistended, no masses or organomegaly   EGBUS: no lesions, no inflammation, no masses  VULVA: normal appearing vulva with no masses, tenderness or lesions  VAGINA: normal appearing vagina with normal color, no lesions,  scant  discharge  CERVIX: normal appearing cervix without discharge or lesions, non tender  UTERUS: uterus is normal size, shape, consistency and nontender   ADNEXA: normal adnexa in size, nontender and no masses  NEURO: alert, grossly oriented to place and time, normal speech,       ASSESSMENT:    ICD-10-CM ICD-9-CM    1. Pelvic pain in female  R10.2 625.9       2. LLQ pain  R10.32 789.04       3.  Ovarian cyst, right  N83.201 620.2           PLAN:  No orders of the defined types were placed in this encounter. The patient was instructed to follow up as needed if symptoms persist or worsen. Instructions were given to patient and the patient was given the opportunity to ask any questions concerning the visit today. The patient should keep/make her routine well woman exam.  The patient should contact our office with any questions or concerns. Rec colonoscopy  We discussed potential causes of lower abdominal/pelvic pain: GYN, GI, , musculoskeletal, infectious process, adhesions, or other etiology, doubt gyn etiology,  We discussed evaluation of lower abdominal/pelvic pain: including but not limited to observation, surgical evaluation/laparoscopy, imaging   We discussed treatment of lower abdominal/pelvic pain: including but not limited to: pain medication, hormonal management, surgical intervention, bowel regimen. Since pain can be a symptoms of an underlying abnormal process she is encouraged to contact my office with persistent symptoms for additional evaluation and treatment if needed. Rec colonoscopy. FU and US to document stability (pt missed US today) can do with WWE if desired/due, or fu w PCP for WWE    Discussed typical course of benign/physiologic ovarian cyst vs s/sx ovarian carcinoma. Notify MD for bowel changes/pain/bloating/early satiety. Discussed hormonal options vs surgical options vs observation. Plan: fu US for stability, likely observation. Disc s/sx of ovarian cancer and other non-gyn causes of pain. Reviewed US and CT back to 2008:  Stable ov cyst  Transabdominal followed by endovaginal ultrasound of the pelvis demonstrates   that the uterus measures 7.9 x 3.9 x 5.4 cm. The endometrial stripe measures   4.3 mm. The uterus is retroflexed. The left ovary measures 1.9 x 0.9 x 1.1   cm.  The right ovary measures 3.6 x 3.3 x 3.3 cm and contains a 2.3 x 2.6 x   2.5 cm cyst.     Today, 12/02/22, I personally spent more than 20 minutes in review of records, documentation,  and face to face counseling with the patient discussing the diagnosis, plan of care and importance of compliance with the treatment plan and performing an exam as well as ordering any appropropriate labs, procedures, or medications. No results found for this visit on 12/02/22.

## 2022-12-06 ENCOUNTER — TELEPHONE (OUTPATIENT)
Dept: FAMILY MEDICINE CLINIC | Age: 67
End: 2022-12-06

## 2022-12-06 DIAGNOSIS — Z12.11 SCREEN FOR COLON CANCER: Primary | ICD-10-CM

## 2022-12-06 RX ORDER — IBUPROFEN 600 MG/1
TABLET ORAL
Qty: 30 TABLET | Refills: 2 | Status: SHIPPED | OUTPATIENT
Start: 2022-12-06

## 2022-12-07 ENCOUNTER — HOSPITAL ENCOUNTER (OUTPATIENT)
Dept: INFUSION THERAPY | Age: 67
Discharge: HOME | End: 2022-12-07
Payer: MEDICARE

## 2022-12-07 VITALS
SYSTOLIC BLOOD PRESSURE: 84 MMHG | OXYGEN SATURATION: 95 % | WEIGHT: 145.3 LBS | RESPIRATION RATE: 18 BRPM | HEART RATE: 70 BPM | HEIGHT: 65 IN | TEMPERATURE: 98 F | BODY MASS INDEX: 24.21 KG/M2 | DIASTOLIC BLOOD PRESSURE: 59 MMHG

## 2022-12-07 DIAGNOSIS — M80.00XD AGE-RELATED OSTEOPOROSIS WITH CURRENT PATHOLOGICAL FRACTURE WITH ROUTINE HEALING, SUBSEQUENT ENCOUNTER: Primary | ICD-10-CM

## 2022-12-07 LAB
ANION GAP BLD CALC-SCNC: 14 MMOL/L (ref 10–20)
CA-I BLD-MCNC: 1.15 MMOL/L (ref 1.12–1.32)
CHLORIDE BLD-SCNC: 110 MMOL/L (ref 98–107)
CO2 BLD-SCNC: 17 MMOL/L (ref 21–32)
CREAT BLD-MCNC: 0.59 MG/DL (ref 0.6–1.3)
GLUCOSE BLD-MCNC: 83 MG/DL (ref 65–100)
POTASSIUM BLD-SCNC: 3.3 MMOL/L (ref 3.5–5.1)
SERVICE CMNT-IMP: ABNORMAL
SODIUM BLD-SCNC: 140 MMOL/L (ref 136–145)

## 2022-12-07 PROCEDURE — 96372 THER/PROPH/DIAG INJ SC/IM: CPT

## 2022-12-07 PROCEDURE — 74011250636 HC RX REV CODE- 250/636: Performed by: FAMILY MEDICINE

## 2022-12-07 PROCEDURE — 80047 BASIC METABLC PNL IONIZED CA: CPT

## 2022-12-07 RX ORDER — DIPHENHYDRAMINE HYDROCHLORIDE 50 MG/ML
50 INJECTION, SOLUTION INTRAMUSCULAR; INTRAVENOUS AS NEEDED
Start: 2023-06-04

## 2022-12-07 RX ORDER — ACETAMINOPHEN 325 MG/1
650 TABLET ORAL AS NEEDED
Start: 2023-06-04

## 2022-12-07 RX ORDER — ONDANSETRON 2 MG/ML
8 INJECTION INTRAMUSCULAR; INTRAVENOUS AS NEEDED
OUTPATIENT
Start: 2023-06-04

## 2022-12-07 RX ORDER — HYDROCORTISONE SODIUM SUCCINATE 100 MG/2ML
100 INJECTION, POWDER, FOR SOLUTION INTRAMUSCULAR; INTRAVENOUS AS NEEDED
OUTPATIENT
Start: 2023-06-04

## 2022-12-07 RX ORDER — DIPHENHYDRAMINE HYDROCHLORIDE 50 MG/ML
25 INJECTION, SOLUTION INTRAMUSCULAR; INTRAVENOUS AS NEEDED
Start: 2023-06-04

## 2022-12-07 RX ORDER — EPINEPHRINE 1 MG/ML
0.3 INJECTION, SOLUTION, CONCENTRATE INTRAVENOUS AS NEEDED
OUTPATIENT
Start: 2023-06-04

## 2022-12-07 RX ORDER — ALBUTEROL SULFATE 0.83 MG/ML
2.5 SOLUTION RESPIRATORY (INHALATION) AS NEEDED
Start: 2023-06-04

## 2022-12-07 RX ADMIN — DENOSUMAB 60 MG: 60 INJECTION SUBCUTANEOUS at 11:25

## 2022-12-07 NOTE — PROGRESS NOTES
[facility] Progress Note    Date: 2022    Name: Georgina Bazan    MRN: [id number]         : 1955    Ms. Aurora Walker Arrived ambulatory and in no distress for Prolia Injection. Assessment was completed, no acute issues at this time, no new complaints voiced. Labs were drawn peripherally. Parameters for treatment were met. Ms. Monica Vázquez vitals were reviewed. Visit Vitals  BP (!) 84/59 (BP 1 Location: Right upper arm, BP Patient Position: Sitting)   Pulse 70   Temp 98 °F (36.7 °C)   Resp 18   Ht 5' 5\" (1.651 m)   Wt 65.9 kg (145 lb 4.8 oz)   SpO2 95%   Breastfeeding No   BMI 24.18 kg/m²         Medications:  Medications Administered       denosumab (PROLIA) injection 60 mg       Admin Date  2022 Action  Given Dose  60 mg Route  SubCUTAneous Administered By  Jodi Pickens RN                      Ms. Aurora Walker tolerated treatment well and was discharged from [place] [address] in stable condition. She is to return on  at 1100 for her next appointment.     Ange Banerjee RN  2022

## 2022-12-08 ENCOUNTER — OFFICE VISIT (OUTPATIENT)
Dept: NEUROLOGY | Age: 67
End: 2022-12-08
Payer: MEDICARE

## 2022-12-08 DIAGNOSIS — R20.2 PARESTHESIA: Primary | ICD-10-CM

## 2022-12-08 NOTE — PROCEDURES
EMG/ NCS Report  DR[facility] RE[facility]ILITATION  - [facility]  [facility]  [facility], [address]   Ph: [phone]   FAX: [phone]/ [phone]  Test Date:  2019      Test Date:  2022    Patient: Roxie Stoll : 1955 Physician: Bruna Pelletier MD   Sex: Female Height: ' \" Ref Phys: Sharmin Vergara MD   ID#: [id number] Weight:  lbs. Technician: Mary Anne Osullivan     Patient History / Exam:  Patient is coming for neuropathy evaluation. Patient noted on and off bilateral hand and feet numbness, involving all fingers and big toes    Exam: Patient awake, alert, follows commands, clear speech; hearing grossly intact; EOMI, (-) facial asymmetry, tongue midline; Motor: 5/5 in all extremities; Sensory: intact to LT, PP, JPS; DTRs ++; Good FTN and HTS; Gait: stable           EMG & NCV Findings:  Evaluation of the left median motor nerve showed normal distal onset latency (3.8 ms), normal amplitude (10.3 mV), and normal conduction velocity (Elbow-Wrist, 51 m/s). The left ulnar motor nerve showed normal distal onset latency (2.7 ms), normal amplitude (7.6 mV), normal conduction velocity (B Elbow-Wrist, 53 m/s), and normal conduction velocity (A Elbow-B Elbow, 59 m/s). The left median sensory, the left radial sensory, and the left ulnar sensory nerves showed normal distal peak latency (L3.6, L2.3, L3.3 ms) and normal amplitude (L59.5, L72.5, L68.6 µV). The left median/ulnar (palm) comparison nerve showed normal distal onset latency (Median Palm, 1.5 ms), normal distal peak latency (Median Palm, 2.0 ms), normal amplitude (Median Palm, 69.5 µV), normal distal onset latency (Ulnar Palm, 1.2 ms), normal distal peak latency (Ulnar Palm, 1.5 ms), and normal amplitude (Ulnar Palm, 35.2 µV). All F Wave latencies were within normal limits. All examined muscles (as indicated in the following table) showed no evidence of electrical instability. Impression:  ABNORMAL    Extensive electrodiagnostic examination of the left upper extremity, including palmar study, shows evidence of a left median mononeuropathy at or distal to the wrist, consistent with a clinical diagnosis of carpal tunnel syndrome, minimal in degree electrically. There is no evidence of a left cervical motor radiculopathy.         Marcelina Rogers MD  Diplomate, American Board of Psychiatry and Neurology  Diplomate, Neuromuscular Medicine  Diplomate, American Board of Electrodiagnostic Medicine  Director, [address] [place] [address] Accredited Laboratory with Exemplary Status           Nerve Conduction Studies  Anti Sensory Summary Table     Stim Site NR Peak (ms) Norm Peak (ms) P-T Amp (µV) Norm P-T Amp Site1 Site2 Dist (cm)   Left Median Anti Sensory (2nd Digit)  29 °C   Wrist    3.6 <4 59.5 >13 Wrist 2nd Digit 14.0   Left Radial Anti Sensory (Base 1st Digit)  30.1 °C   Wrist    2.3 <2.8 72.5 >11 Wrist Base 1st Digit 10.0   Left Ulnar Anti Sensory (5th Digit)  30.1 °C   Wrist    3.3 <4.0 68.6 >9 Wrist 5th Digit 14.0     Motor Summary Table     Stim Site NR Onset (ms) Norm Onset (ms) O-P Amp (mV) Norm O-P Amp Amp (Prev) (%) Site1 Site2 Dist (cm) Sadiq (m/s) Norm Sadiq (m/s)   Left Median Motor (Abd Poll Brev)  30.2 °C   Wrist    3.8 <4.5 10.3 >4.1 100.0 Wrist Abd Poll Brev 8.0     Elbow    7.7  10.0  97.1 Elbow Wrist 20.0 51 >49   Left Ulnar Motor (Abd Dig Minimi)  22.4 °C   Wrist    2.7 <3.1 7.6 >7.0 100.0 Wrist Abd Dig Minimi 8.0     B Elbow    6.2  7.0  92.1 B Elbow Wrist 18.5 53 >50   A Elbow    7.9  6.9  98.6 A Elbow B Elbow 10.0 59 >50     Comparison Summary Table     Stim Site NR Peak (ms) P-T Amp (µV) Site1 Site2 Dist (cm) Delta-0 (ms)   Left Median/Ulnar Palm Comparison (Wrist)  23 °C   Median Palm    2.0 74.5 Median Palm Ulnar Palm 8.0 0.3   Ulnar Palm    1.5 38.3         F Wave Studies     NR F-Lat (ms) Lat Norm (ms) L-R F-Lat (ms) L-R Lat Norm   Left Ulnar (Mrkrs) (Abd Dig Min)  30.5 °C      27.25 <32  <2.5     EMG     Side Muscle Nerve Root Ins Act Fibs Psw Recrt Duration Amp Poly Comment   Left 1stDorInt Ulnar C8-T1 Nml Nml Nml Nml Nml Nml Nml    Left ExtIndicis Radial (Post Int) C7-8 Nml Nml Nml Nml Nml Nml Nml    Left Abd Poll Brev Median C8-T1 Nml Nml Nml Nml Nml Nml Nml    Left Biceps Musculocut C5-6 Nml Nml Nml Nml Nml Nml Nml    Left Triceps Radial C6-7-8 Nml Nml Nml Nml Nml Nml Nml    Left Deltoid Axillary C5-6 Nml Nml Nml Nml Nml Nml Nml    Left Lower Cerv Parasp Rami C7,T1 Nml Nml Nml Nml Nml Nml Nml                Nerve Conduction Studies  Anti Sensory Left/Right Comparison     Stim Site L Lat (ms) R Lat (ms) L-R Lat (ms) L Amp (µV) R Amp (µV) L-R Amp (%) Site1 Site2 L Sadiq (m/s) R Sadiq (m/s) L-R Sadiq (m/s)   Median Anti Sensory (2nd Digit)  29 °C   Wrist 2.5   59.5   Wrist 2nd Digit 56     Radial Anti Sensory (Base 1st Digit)  30.1 °C   Wrist 1.7   72.5   Wrist Base 1st Digit 59     Ulnar Anti Sensory (5th Digit)  30.1 °C   Wrist 2.5   68.6   Wrist 5th Digit 56       Motor Left/Right Comparison     Stim Site L Lat (ms) R Lat (ms) L-R Lat (ms) L Amp (mV) R Amp (mV) L-R Amp (%) Site1 Site2 L Sadiq (m/s) R Sadiq (m/s) L-R Sadiq (m/s)   Median Motor (Abd Poll Brev)  30.2 °C   Wrist 3.8   10.3   Wrist Abd Poll Brev      Elbow 7.7   10.0   Elbow Wrist 51     Ulnar Motor (Abd Dig Minimi)  22.4 °C   Wrist 2.7   7.6   Wrist Abd Dig Minimi      B Elbow 6.2   7.0   B Elbow Wrist 53     A Elbow 7.9   6.9   A Elbow B Elbow 59       Comparison Left/Right Comparison     Stim Site L Lat (ms) R Lat (ms) L-R Lat (ms) L Amp (µV) R Amp (µV) L-R Amp (%)   Median/Ulnar Palm Comparison (Wrist)  23 °C   Median Palm 1.5   69.5     Ulnar Palm 1.2   35.2           Waveforms:

## 2022-12-08 NOTE — PROGRESS NOTES
EMG/NCS done. See Procedure Note for results. Discussed EMG/NCS of the L UE showing minimal L CTS    A> L CTS, Minimal    P> Advise wrist brace every night  Scheduled to see Dr Blaine Chin for possible gamma knife of brain mass    Follow up after neuropsych testing in June 2023.     Bijal Dodson MD

## 2022-12-12 ENCOUNTER — TRANSCRIBE ORDER (OUTPATIENT)
Dept: SCHEDULING | Age: 67
End: 2022-12-12

## 2022-12-12 DIAGNOSIS — D49.7: Primary | ICD-10-CM

## 2022-12-22 NOTE — PROGRESS NOTES
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 Linda Keene was seen for 12 sessions regarding her upper lumbar pain. We could get improvements during session, however there was limited benefit in between sessions. MRI indicated compression deformities at T11 and L1. Due to this and limited long term benefit, we held further PT after this session. Long Term Goals:  To be accomplished in 12-16 treatments:              1. Pt will be able to get up out of bed without increase in pain 75% MET              2. Pt will be able to garden without balance difficulties or pain NOT MET              3. Pt will be able to stand to cook a meal without difficulty NOT MET              4. Pt will be able to self-manage care using updated HEP for improved independence IMPROVING              5. Improved FOTO score to 60 or better to demonstrate improved function PARTIAL PROGRESS    RECOMMENDATIONS:  [x]Discontinue therapy: []Patient has reached or is progressing toward set goals     []Patient is non-compliant or has abdicated     [x]Due to lack of appreciable progress towards set [address] Constantino, PT, DPT 12/22/2022

## 2023-01-09 ENCOUNTER — OFFICE VISIT (OUTPATIENT)
Dept: NEUROLOGY | Age: 68
End: 2023-01-09
Payer: MEDICARE

## 2023-01-09 VITALS
SYSTOLIC BLOOD PRESSURE: 110 MMHG | DIASTOLIC BLOOD PRESSURE: 60 MMHG | HEART RATE: 66 BPM | BODY MASS INDEX: 24.18 KG/M2 | OXYGEN SATURATION: 95 % | HEIGHT: 65 IN

## 2023-01-09 DIAGNOSIS — G89.29 CHRONIC LEFT-SIDED LOW BACK PAIN, UNSPECIFIED WHETHER SCIATICA PRESENT: Primary | ICD-10-CM

## 2023-01-09 DIAGNOSIS — R20.2 PARESTHESIA: ICD-10-CM

## 2023-01-09 DIAGNOSIS — R41.89 COGNITIVE IMPAIRMENT: ICD-10-CM

## 2023-01-09 DIAGNOSIS — M54.50 CHRONIC LEFT-SIDED LOW BACK PAIN, UNSPECIFIED WHETHER SCIATICA PRESENT: Primary | ICD-10-CM

## 2023-01-09 PROCEDURE — 1101F PT FALLS ASSESS-DOCD LE1/YR: CPT | Performed by: PSYCHIATRY & NEUROLOGY

## 2023-01-09 PROCEDURE — G8427 DOCREV CUR MEDS BY ELIG CLIN: HCPCS | Performed by: PSYCHIATRY & NEUROLOGY

## 2023-01-09 PROCEDURE — 1123F ACP DISCUSS/DSCN MKR DOCD: CPT | Performed by: PSYCHIATRY & NEUROLOGY

## 2023-01-09 PROCEDURE — G9899 SCRN MAM PERF RSLTS DOC: HCPCS | Performed by: PSYCHIATRY & NEUROLOGY

## 2023-01-09 PROCEDURE — G8536 NO DOC ELDER MAL SCRN: HCPCS | Performed by: PSYCHIATRY & NEUROLOGY

## 2023-01-09 PROCEDURE — G8420 CALC BMI NORM PARAMETERS: HCPCS | Performed by: PSYCHIATRY & NEUROLOGY

## 2023-01-09 PROCEDURE — 3017F COLORECTAL CA SCREEN DOC REV: CPT | Performed by: PSYCHIATRY & NEUROLOGY

## 2023-01-09 PROCEDURE — 3078F DIAST BP <80 MM HG: CPT | Performed by: PSYCHIATRY & NEUROLOGY

## 2023-01-09 PROCEDURE — 3074F SYST BP LT 130 MM HG: CPT | Performed by: PSYCHIATRY & NEUROLOGY

## 2023-01-09 PROCEDURE — 1090F PRES/ABSN URINE INCON ASSESS: CPT | Performed by: PSYCHIATRY & NEUROLOGY

## 2023-01-09 PROCEDURE — 99215 OFFICE O/P EST HI 40 MIN: CPT | Performed by: PSYCHIATRY & NEUROLOGY

## 2023-01-09 PROCEDURE — G9717 DOC PT DX DEP/BP F/U NT REQ: HCPCS | Performed by: PSYCHIATRY & NEUROLOGY

## 2023-01-09 RX ORDER — GABAPENTIN 300 MG/1
300 CAPSULE ORAL
Qty: 30 CAPSULE | Refills: 3 | Status: SHIPPED | OUTPATIENT
Start: 2023-01-09

## 2023-01-09 NOTE — LETTER
1/9/2023    Patient: Kennedy Kovacs   YOB: 1955   Date of Visit: 1/9/2023     Tomasz Cordero MD  [address] [place]    Dear Tomasz Cordero MD,      Thank you for referring Ms. Kennedy Kovacs to [facility] for evaluation. My notes for this consultation are attached. If you have questions, please do not hesitate to call me. I look forward to following your patient along with you.       Sincerely,    Robert Werner MD

## 2023-01-09 NOTE — PROGRESS NOTES
Neurology Progress Note    Patient ID:  Jonatan Mendez  [id number]  78 y.o.  1955      Subjective:   History:  Jonatan Mendez is a female who  has a past medical history of Allergic conjunctivitis of both eyes (10/19/2015), Brain tumor ([facility] [address] Depression (09/11/2014), Essential hypertension (12/15/2017), Hot flashes (06/23/2014), Hot flashes, Hypercholesterolemia, and Osteoarthritis of right knee (03/11/2014) who since 2021, noted tremors of both hands L worse than R, when resting and writing. Was on Propanolol. Consideration includes essential tremor (although no tremor observed during her visit today). The absence of other extrapyramidal features on exam makes Parkinson's disease less likely. Patient also started having balance issues 6 months ago, with tendency to fall to the L side when bending over and problem initiating gait. (-) dizziness (-) lightheadedness. Consideration whether this is related to 1.8 x 1.5 x 2.0 cm avidly enhancing mass in the region of the pineal gland. Mild mass effect on the superior tectum, without parenchymal edema. Also has small 6 mm meningioma at the left superior frontal convexity. Third, patient noted on and off bilateral hand and feet numbness, involving all fingers and big toes. . Considerations include carpal tunnel syndrome and lumbar radiculopathy (chronic lower back pain since falling; L sided, non-radiating)     Lastly, patient is also complaining of cognitive issues. Currently, patient remains the same. Still having 5/10 L lower back pain, non-radiating, tight, okay in the morning, but gets worse by the afternoon after standing for a while. Has not seen neurosurgeon Dr Lanny Mejia. Started using L wrist brace with benefit.       EMG/NCS of the L UE (12/8/22)  showing minimal L CTS    ROS:  Per HPI-  Otherwise the remainder of ROS was negative    Social Hx  Social History     Socioeconomic History    Marital status:    Tobacco Use    Smoking status: Former     Types: Cigarettes    Smokeless tobacco: Never    Tobacco comments:     for only a couple of years in her late teens   Vaping Use    Vaping Use: Never used   Substance and Sexual Activity    Alcohol use: Yes     Comment: 2 glasses of wine, 2-3 x a week    Drug use: No    Sexual activity: Yes     Partners: Female       Meds:  Current Outpatient Medications on File Prior to Visit   Medication Sig Dispense Refill    ibuprofen (MOTRIN) 600 mg tablet TAKE ONE TABLET BY MOUTH EVERY 6 HOURS AS NEEDED FOR PAIN 30 Tablet 2    denosumab (PROLIA) 60 mg/mL injection 60 mg by SubCUTAneous route every 6 months.      ergocalciferol (ERGOCALCIFEROL) 1,250 mcg (50,000 unit) capsule Take 1 Capsule by mouth every Monday and Thursday for 24 doses. Take for 12 weeks 24 Capsule 0    propranolol LA (INDERAL LA) 60 mg SR capsule TAKE ONE CAPSULE BY MOUTH EVERY MORNING 90 Capsule 3    calcium-cholecalciferol, D3, (CALTRATE 600+D) tablet Take 1 Tablet by mouth two (2) times a day. pravastatin (PRAVACHOL) 40 mg tablet TAKE ONE TABLET BY MOUTH ONCE NIGHTLY 90 Tablet 2    multivitamin (ONE A DAY) tablet Take 1 Tablet by mouth in the morning. busPIRone (BUSPAR) 5 mg tablet TAKE ONE TABLET BY MOUTH TWICE A  Tablet 3     No current facility-administered medications on file prior to visit. Imaging:    CT Results (recent):  Results from Hospital Encounter encounter on 09/10/22    CT ABD PELV WO CONT    Narrative  INDICATION: Unspecified abdominal pain. Exam: Noncontrast CT of the abdomen and pelvis is performed with 5 mm  collimation. Sagittal and coronal reformatted images were also performed. CT dose reduction was achieved through the use of a standardized protocol  tailored for this examination and automatic exposure control for dose  modulation. Comparison is made to prior MRI of the thoracic spine dated August 19, 2022.   Direct comparison is made to prior CT of the chest, including upper abdomen,  dated April 2022. FINDINGS:    There is minimal bibasilar linear scarring. Abdomen:    Liver: The liver is normal on noncontrast images. Spleen: The spleen is normal on noncontrast images. Adrenals: The adrenals are normal on noncontrast images. Pancreas: The pancreas is normal on noncontrast images. Gallbladder: The gallbladder is normal on noncontrast images. Kidneys: There is no perinephric stranding, hydronephrosis or hydroureter. No  renal, ureteral bladder calculus is visualized. Bowel: No thickened or dilated loop of large or small bowel seen. Appendix: The appendix is normal.    Bones: T11 and T12 compression deformities are unchanged as compared to prior  thoracic spine dated August 2022. Pelvis: Urinary bladder is partially filled and grossly normal.    Miscellaneous: There is no free intraperitoneal gas or fluid. There is no focal  fluid collection to suggest abscess. There is a 2.7 cm right ovarian follicle. Impression  No renal calculi or evidence of obstructive uropathy. MRI Results (recent):  Results from [place] encounter on 12/01/22    MRI BRAIN W WO CONT    Narrative  INDICATION: Neoplasm of unspecified behavior of endocrine glands and parts of  nervous system    COMPARISON:  August 4, 2022    TECHNIQUE:  Multiplanar multisequence acquisition without and with IV contrast  of the brain. FINDINGS:    Ventricles:  Midline, no hydrocephalus. Brain Parenchyma/Brainstem:  Normal for age. No acute infarction. Intracranial Hemorrhage:  None. Basal Cisterns:  Normal.  Flow Voids:  Normal.  Post Contrast:  The lobulated, enhancing mass in the region of the pineal gland  has not changed significantly, and measures 1.9 x 1.4 x 2.0 cm (previously 1.8 x  1.5 x 2.0 cm). No abnormal parenchymal enhancement. Left frontal convexity  meningioma, 7 mm, unchanged (series 11 image 116). Additional Comments:  N/A. Impression  1.  No significant change in lobulated, avidly enhancing 1.9 x 1.4 x 2.0 cm  pineal region mass. 2. Unchanged small left frontal convexity meningioma. IR Results (recent):  Results from Hospital Encounter encounter on 10/25/22    IR KYPHOPLASTY LUMBAR    Narrative  Clinical Data: 32-year-old female with T11 and L1 vertebral body fractures  presents for kyphoplasty. Patient has a history of osteoporosis. : Kota Owusu MD    Estimated Blood Loss: Minimal  Specimens: None  Complications: None    Procedure:  1. Fluoroscopy guided placement of cannulas into the T11 and L1 pedicles. 2. Balloon kyphoplasty of T11 and L1 with cement augmentation. 3. IV conscious sedation. Moderate intravenous conscious sedation was supervised by Dr. Cammy Ugalde. The  patient was independently monitored by a registered nurse assigned to the  Department of Radiology using automated blood pressure, EKG, and pulse oximetry. The detail conscious sedation record is stored in the hospital information  system. Medication:  Versed: 10 mg  Fentanyl: 200 mcg  Intraprocedure time: 36 minutes    FLUOROSCOPY TIME: 12.0 min  FLUOROSCOPY DOSE (air kerma): 358 mGy    Body:  Following discussion of risks, benefits and alternatives, informed written  consent was obtained. The patient was placed prone on the fluoroscopic exam table and prepped and  draped in sterile fashion. The T11 and L1 identified fluoroscopically. We initially focused on the T11 vertebral body. 1% lidocaine was administered to  the subcutaneous tissues for local anesthesia, and 0.25% bupivacaine was  administered into the periosteum of the bilateral pedicles under fluoroscopic  guidance using a 22-gauge 10 cm spinal needle for local anesthesia. Under  biplane fluoroscopic guidance, the the right pedicle was traversed with a Kyphon  cannula. The tip of the cannula was placed just within the vertebral body.  A  drill was then advanced to within the anterior portion of the vertebral body.  This procedure was repeated on the left pedicle. Next we moved to the L1 vertebral body. 1% lidocaine was administered to the  subcutaneous tissues for local anesthesia, and 0.25% bupivacaine was  administered into the periosteum of the bilateral pedicles under fluoroscopic  guidance using a 22-gauge 10 cm spinal needle for local anesthesia. Under  biplane fluoroscopic guidance, the the right pedicle was traversed with a Kyphon  cannula. The tip of the cannula was placed just within the vertebral body. A  drill was then advanced to within the anterior portion of the vertebral body. This procedure was repeated on the left pedicle. Kyphoplasty balloons were then inserted into each cannula, and inflated gently  to create space for cement augmentation. The balloons were deflated and removed. Then, cement was infused via each cannula under fluoroscopic guidance. The  cannulas were removed and bandages were applied. There were no immediate  complications. The patient tolerated the procedure well and left the interventional radiology  suite in stable condition. Impression  1. Successful T11 and L1 kyphoplasty as above. 2.  Recommend initiation of medical management for osteoporosis if not already  performed. VAS/US Results (recent):  No results found for this or any previous visit.       Reviewed records in [facility] and media tab today    Lab Review     Hospital Outpatient Visit on 12/07/2022   Component Date Value Ref Range Status    Calcium, ionized (POC) 12/07/2022 1.15  1.12 - 1.32 mmol/L Final    Sodium (POC) 12/07/2022 140  136 - 145 mmol/L Final    Potassium (POC) 12/07/2022 3.3 (A)  3.5 - 5.1 mmol/L Final    Chloride (POC) 12/07/2022 110 (A)  98 - 107 mmol/L Final    CO2 (POC) 12/07/2022 17.0 (A)  21 - 32 mmol/L Final    Anion gap (POC) 12/07/2022 14  10 - 20 mmol/L Final    Glucose (POC) 12/07/2022 83  65 - 100 mg/dL Final    Creatinine (POC) 12/07/2022 0.59 (A)  0.6 - 1.3 mg/dL Final eGFR (POC) 12/07/2022 >60  >60 ml/min/1.73m2 Final    Comment:      Pediatric calculator link: Jenifer.at. org/professionals/kdoqi/gfr_calculatorped       These results are not intended for use in patients <25years of age. eGFR results are calculated without a race factor using  the 2021 CKD-EPI equation. Careful clinical correlation is recommended, particularly when comparing to results calculated using previous equations. The CKD-EPI equation is less accurate in patients with extremes of muscle mass, extra-renal metabolism of creatinine, excessive creatine ingestion, or following therapy that affects renal tubular secretion. Comment 12/07/2022 Comment Not Indicated. Final   Office Visit on 10/19/2022   Component Date Value Ref Range Status    VITAMIN D, 25-HYDROXY 10/24/2022 28.4 (A)  30.0 - 100.0 ng/mL Final    Comment: Vitamin D deficiency has been defined by the [address] practice guideline as a  level of serum 25-OH vitamin D less than 20 ng/mL (1,2). The Endocrine Society went on to further define vitamin D  insufficiency as a level between 21 and 29 ng/mL (2). 1. IOM ([place] of [employer]). 2010. Dietary reference     intakes for calcium and D. [facility]: The     [employer]. 2. Shaggy MF, Fidel MELCHOR, Hanane STACK, et al.     Evaluation, treatment, and prevention of vitamin D     deficiency: an Endocrine Society clinical practice     guideline. JCEM. 2011 Jul; 96(7):1911-30. Objective:       Exam:  Visit Vitals  /60   Pulse 66   Ht 5' 5\" (1.651 m)   SpO2 95%   BMI 24.18 kg/m²     Gen: Awake, alert, follows commands  Appropriate appearance, normal speech. Oriented to all spheres. No visual field defect on confrontation exam.  Full eyes movement, with no nystagmus, no diplopia, no ptosis. Normal gag and swallow.   All remaining cranial nerves were normal  Motor function: 5/5 in all extremities  Sensory: intact to LT, PP and JPS  Good FTN and HTS   Gait: Normal    Assessment:       ICD-10-CM ICD-9-CM    1. Chronic left-sided low back pain, unspecified whether sciatica present  M54.50 724.2 gabapentin (NEURONTIN) 300 mg capsule    G89.29 338.29       2. Cognitive impairment  R41.89 294.9       3. Paresthesia  R20.2 782.0           L CTS, Minimal     MRI brain (8/4/22): 1. A 1.8 x 1.5 x 2.0 cm avidly enhancing mass in the region of the pineal gland. Mild mass effect on the superior tectum, without parenchymal edema. Given the presence of a small 6 mm meningioma at the left superior frontal convexity, this is suspicious for a meningioma in the region of the pineal gland. However,  differential considerations are broad, including pineal gland neoplasms (either benign or malignant), lymphoma, and metastasis among other considerations. L lower back pain - MRI lumbar spine: Mild superior endplate compression deformities at T11 and L1. Less than 50% loss vertebral body height. No cortical retropulsion or epidural hematomas are identified. These fractures are amenable to percutaneous kyphoplasty for pain palliation. Plan:   Trial of Gabapentin 300 mg QHS  2. . Advise to talk to neurosurgeon Dr Tory Ordoñez regarding surgical option of lower back  3. Continue L wrist brace at night  4. Scheduled to see Dr Holly Angel for possible gamma knife of brain mass  5. Patient is getting physical therapy with minimal benefit. 6. Patient wants her memory to be tested. Neuropsychological testing scheduled for June 2023  7. Depending on above, will consider referral to pain management for lower back pain      Follow-up and Dispositions    Return in about 1 month (around 2/9/2023).              Adam Fischer MD  Diplomate, American Board of Psychiatry and Neurology  Diplomate, Neuromuscular Medicine  Diplomate, American Board of Electrodiagnostic Medicine

## 2023-02-15 NOTE — PROGRESS NOTES
Infant remains in room air.  Several apnea/bradycardia episodes only during feeds. Trialing Nfant gold nipple as recommended by speech therapy. Infant continues to have episodes with gold nipple. Appears to be refluxing during feeds. Unable to complete an entire feeding this shift. Voiding and stooling adequately.  Buttocks reddened, barrier cream applied. Rash noted on neck. Mom in to visit.  Mom bottle fed infant at 5pm feeding.  Updated on current plan of care. Questions and concerns appropriate.   Neurology Progress Note    Patient ID:  Baljit Houston  [id number]  78 y.o.  1955      Subjective:   History:  Baljit Houston is a female who  has a past medical history of Allergic conjunctivitis of both eyes (10/19/2015), Brain tumor ([facility] [address] Depression (09/11/2014), Essential hypertension (12/15/2017), Hot flashes (06/23/2014) Hypercholesterolemia, and Osteoarthritis of right knee (03/11/2014) who since 2021, noted tremors of both hands L worse than R, when resting and writing. Was on Propanolol. Consideration includes essential tremor (although no tremor observed during her visit today). The absence of other extrapyramidal features on exam makes Parkinson's disease less likely. Patient also started having balance issues 6 months ago, with tendency to fall to the L side when bending over and problem initiating gait. (-) dizziness (-) lightheadedness. Consideration whether this is related to 1.8 x 1.5 x 2.0 cm avidly enhancing mass in the region of the pineal gland. Mild mass effect on the superior tectum, without parenchymal edema. Also has small 6 mm meningioma at the left superior frontal convexity. Third, patient noted on and off bilateral hand and feet numbness, involving all fingers and big toes. . Considerations include carpal tunnel syndrome and lumbar radiculopathy (chronic lower back pain since falling; L sided, non-radiating)     Lastly, patient is also complaining of cognitive issues. Patient was started on Gabapentin 300 mg QHS with reduced low back pain at night which is helping her sleep. However, during the day, she is in pain. Has not seen her back surgeon. With regards to her brain mass, patient is leaning to wards gamma knife and jus saw Dr Carolyn Brooks. Started using L wrist brace with benefit.           ROS:  Per HPI-  Otherwise the remainder of ROS was negative    Social Hx  Social History     Socioeconomic History    Marital status:    Tobacco Use    Smoking status: Former     Types: Cigarettes    Smokeless tobacco: Never    Tobacco comments:     for only a couple of years in her late teens   Vaping Use    Vaping Use: Never used   Substance and Sexual Activity    Alcohol use: Yes     Comment: 2 glasses of wine, 2-3 x a week    Drug use: No    Sexual activity: Yes     Partners: Female       Meds:  Current Outpatient Medications on File Prior to Visit   Medication Sig Dispense Refill    ibuprofen (MOTRIN) 600 mg tablet TAKE ONE TABLET BY MOUTH EVERY 6 HOURS AS NEEDED FOR PAIN 30 Tablet 2    denosumab (PROLIA) 60 mg/mL injection 60 mg by SubCUTAneous route every 6 months. propranolol LA (INDERAL LA) 60 mg SR capsule TAKE ONE CAPSULE BY MOUTH EVERY MORNING 90 Capsule 3    calcium-cholecalciferol, D3, (CALTRATE 600+D) tablet Take 1 Tablet by mouth two (2) times a day. pravastatin (PRAVACHOL) 40 mg tablet TAKE ONE TABLET BY MOUTH ONCE NIGHTLY 90 Tablet 2    multivitamin (ONE A DAY) tablet Take 1 Tablet by mouth in the morning. busPIRone (BUSPAR) 5 mg tablet TAKE ONE TABLET BY MOUTH TWICE A  Tablet 3     No current facility-administered medications on file prior to visit. Imaging:    CT Results (recent):  Results from Hospital Encounter encounter on 09/10/22    CT ABD PELV WO CONT    Narrative  INDICATION: Unspecified abdominal pain. Exam: Noncontrast CT of the abdomen and pelvis is performed with 5 mm  collimation. Sagittal and coronal reformatted images were also performed. CT dose reduction was achieved through the use of a standardized protocol  tailored for this examination and automatic exposure control for dose  modulation. Comparison is made to prior MRI of the thoracic spine dated August 19, 2022. Direct comparison is made to prior CT of the chest, including upper abdomen,  dated April 2022. FINDINGS:    There is minimal bibasilar linear scarring. Abdomen:    Liver: The liver is normal on noncontrast images. Spleen:  The spleen is normal on noncontrast images. Adrenals: The adrenals are normal on noncontrast images. Pancreas: The pancreas is normal on noncontrast images. Gallbladder: The gallbladder is normal on noncontrast images. Kidneys: There is no perinephric stranding, hydronephrosis or hydroureter. No  renal, ureteral bladder calculus is visualized. Bowel: No thickened or dilated loop of large or small bowel seen. Appendix: The appendix is normal.    Bones: T11 and T12 compression deformities are unchanged as compared to prior  thoracic spine dated August 2022. Pelvis: Urinary bladder is partially filled and grossly normal.    Miscellaneous: There is no free intraperitoneal gas or fluid. There is no focal  fluid collection to suggest abscess. There is a 2.7 cm right ovarian follicle. Impression  No renal calculi or evidence of obstructive uropathy. MRI Results (recent):  Results from [place] encounter on 12/01/22    MRI BRAIN W WO CONT    Narrative  INDICATION: Neoplasm of unspecified behavior of endocrine glands and parts of  nervous system    COMPARISON:  August 4, 2022    TECHNIQUE:  Multiplanar multisequence acquisition without and with IV contrast  of the brain. FINDINGS:    Ventricles:  Midline, no hydrocephalus. Brain Parenchyma/Brainstem:  Normal for age. No acute infarction. Intracranial Hemorrhage:  None. Basal Cisterns:  Normal.  Flow Voids:  Normal.  Post Contrast:  The lobulated, enhancing mass in the region of the pineal gland  has not changed significantly, and measures 1.9 x 1.4 x 2.0 cm (previously 1.8 x  1.5 x 2.0 cm). No abnormal parenchymal enhancement. Left frontal convexity  meningioma, 7 mm, unchanged (series 11 image 116). Additional Comments:  N/A. Impression  1. No significant change in lobulated, avidly enhancing 1.9 x 1.4 x 2.0 cm  pineal region mass. 2. Unchanged small left frontal convexity meningioma.       IR Results (recent):  Results from Hospital Encounter encounter on 10/25/22    IR KYPHOPLASTY LUMBAR    Narrative  Clinical Data: 70-year-old female with T11 and L1 vertebral body fractures  presents for kyphoplasty. Patient has a history of osteoporosis. : Jadyn Pa MD    Estimated Blood Loss: Minimal  Specimens: None  Complications: None    Procedure:  1. Fluoroscopy guided placement of cannulas into the T11 and L1 pedicles. 2. Balloon kyphoplasty of T11 and L1 with cement augmentation. 3. IV conscious sedation. Moderate intravenous conscious sedation was supervised by Dr. Rupa Colby. The  patient was independently monitored by a registered nurse assigned to the  Department of Radiology using automated blood pressure, EKG, and pulse oximetry. The detail conscious sedation record is stored in the hospital information  system. Medication:  Versed: 10 mg  Fentanyl: 200 mcg  Intraprocedure time: 36 minutes    FLUOROSCOPY TIME: 12.0 min  FLUOROSCOPY DOSE (air kerma): 358 mGy    Body:  Following discussion of risks, benefits and alternatives, informed written  consent was obtained. The patient was placed prone on the fluoroscopic exam table and prepped and  draped in sterile fashion. The T11 and L1 identified fluoroscopically. We initially focused on the T11 vertebral body. 1% lidocaine was administered to  the subcutaneous tissues for local anesthesia, and 0.25% bupivacaine was  administered into the periosteum of the bilateral pedicles under fluoroscopic  guidance using a 22-gauge 10 cm spinal needle for local anesthesia. Under  biplane fluoroscopic guidance, the the right pedicle was traversed with a Kyphon  cannula. The tip of the cannula was placed just within the vertebral body. A  drill was then advanced to within the anterior portion of the vertebral body. This procedure was repeated on the left pedicle. Next we moved to the L1 vertebral body.  1% lidocaine was administered to the  subcutaneous tissues for local anesthesia, and 0.25% bupivacaine was  administered into the periosteum of the bilateral pedicles under fluoroscopic  guidance using a 22-gauge 10 cm spinal needle for local anesthesia. Under  biplane fluoroscopic guidance, the the right pedicle was traversed with a Kyphon  cannula. The tip of the cannula was placed just within the vertebral body. A  drill was then advanced to within the anterior portion of the vertebral body. This procedure was repeated on the left pedicle. Kyphoplasty balloons were then inserted into each cannula, and inflated gently  to create space for cement augmentation. The balloons were deflated and removed. Then, cement was infused via each cannula under fluoroscopic guidance. The  cannulas were removed and bandages were applied. There were no immediate  complications. The patient tolerated the procedure well and left the interventional radiology  suite in stable condition. Impression  1. Successful T11 and L1 kyphoplasty as above. 2.  Recommend initiation of medical management for osteoporosis if not already  performed. VAS/US Results (recent):  No results found for this or any previous visit. Reviewed records in [facility] and media [facility] today    Lab Review     Hospital Outpatient Visit on 12/07/2022   Component Date Value Ref Range Status    Calcium, ionized (POC) 12/07/2022 1.15  1.12 - 1.32 mmol/L Final    Sodium (POC) 12/07/2022 140  136 - 145 mmol/L Final    Potassium (POC) 12/07/2022 3.3 (A)  3.5 - 5.1 mmol/L Final    Chloride (POC) 12/07/2022 110 (A)  98 - 107 mmol/L Final    CO2 (POC) 12/07/2022 17.0 (A)  21 - 32 mmol/L Final    Anion gap (POC) 12/07/2022 14  10 - 20 mmol/L Final    Glucose (POC) 12/07/2022 83  65 - 100 mg/dL Final    Creatinine (POC) 12/07/2022 0.59 (A)  0.6 - 1.3 mg/dL Final    eGFR (POC) 12/07/2022 >60  >60 ml/min/1.73m2 Final    Comment:      Pediatric calculator link: Jenifer.at. org/professionals/kdoqi/gfr_calculatorped These results are not intended for use in patients <25years of age. eGFR results are calculated without a race factor using  the 2021 CKD-EPI equation. Careful clinical correlation is recommended, particularly when comparing to results calculated using previous equations. The CKD-EPI equation is less accurate in patients with extremes of muscle mass, extra-renal metabolism of creatinine, excessive creatine ingestion, or following therapy that affects renal tubular secretion. Comment 12/07/2022 Comment Not Indicated. Final         Objective:       Exam:  Visit Vitals  /70   Pulse 76   Temp 97.3 °F (36.3 °C)   Ht 5' 5\" (1.651 m)   Wt 66.2 kg (146 lb)   SpO2 95%   BMI 24.30 kg/m²     Gen: Awake, alert, follows commands  Appropriate appearance, normal speech. Oriented to all spheres. No visual field defect on confrontation exam.  Full eyes movement, with no nystagmus, no diplopia, no ptosis. Normal gag and swallow. All remaining cranial nerves were normal  Motor function: 5/5 in all extremities  Sensory: intact to LT, PP and JPS  Good FTN and HTS   Gait: Normal    Assessment:       ICD-10-CM ICD-9-CM    1. Chronic left-sided low back pain, unspecified whether sciatica present  M54.50 724.2 gabapentin (NEURONTIN) 300 mg capsule    G89.29 338.29       2. Brain mass  G93.89 348.89       3. Carpal tunnel syndrome of left wrist  G56.02 354.0           L CTS, Minimal     MRI brain (8/4/22): 1. A 1.8 x 1.5 x 2.0 cm avidly enhancing mass in the region of the pineal gland. Mild mass effect on the superior tectum, without parenchymal edema. Given the presence of a small 6 mm meningioma at the left superior frontal convexity, this is suspicious for a meningioma in the region of the pineal gland. However,  differential considerations are broad, including pineal gland neoplasms (either benign or malignant), lymphoma, and metastasis among other considerations.      L lower back pain - MRI lumbar spine: Mild superior endplate compression deformities at T11 and L1. Less than 50% loss vertebral body height. No cortical retropulsion or epidural hematomas are identified. These fractures are amenable to percutaneous kyphoplasty for pain palliation. Plan:   Increase Gabapentin 300 mg BID to help with lower back pain  2.. Offered referral to pain management. Patient wants to see her neurosurgeon Dr Tamiko Collins first regarding surgical option of lower back since radiologist mentioned that it is amenable to percutaneous kyphoplasty for pain palliation  3. Continue L wrist brace at night  4. Has a follow up with  Dr Carolyn Brooks for possible gamma knife of brain mass  5. Patient is getting physical therapy with minimal benefit. 6. Patient wants her memory to be tested. Neuropsychological testing scheduled for June 2023  7. Of note, patient is thinking of going to [place] for her mother's 90th birthday      Follow-up and Dispositions    Return in about 5 months (around 7/16/2023) for after neuropsytch ological testing.                Fernando Aguila MD  Diplomate, American Board of Psychiatry and Neurology  Diplomate, Neuromuscular Medicine  Diplomate, American Board of Electrodiagnostic Medicine

## 2023-02-16 ENCOUNTER — OFFICE VISIT (OUTPATIENT)
Dept: NEUROLOGY | Age: 68
End: 2023-02-16
Payer: MEDICARE

## 2023-02-16 VITALS
HEART RATE: 76 BPM | OXYGEN SATURATION: 95 % | HEIGHT: 65 IN | WEIGHT: 146 LBS | TEMPERATURE: 97.3 F | BODY MASS INDEX: 24.32 KG/M2 | SYSTOLIC BLOOD PRESSURE: 120 MMHG | DIASTOLIC BLOOD PRESSURE: 70 MMHG

## 2023-02-16 DIAGNOSIS — G93.89 BRAIN MASS: ICD-10-CM

## 2023-02-16 DIAGNOSIS — G89.29 CHRONIC LEFT-SIDED LOW BACK PAIN, UNSPECIFIED WHETHER SCIATICA PRESENT: Primary | ICD-10-CM

## 2023-02-16 DIAGNOSIS — G56.02 CARPAL TUNNEL SYNDROME OF LEFT WRIST: ICD-10-CM

## 2023-02-16 DIAGNOSIS — M54.50 CHRONIC LEFT-SIDED LOW BACK PAIN, UNSPECIFIED WHETHER SCIATICA PRESENT: Primary | ICD-10-CM

## 2023-02-16 PROCEDURE — 99215 OFFICE O/P EST HI 40 MIN: CPT | Performed by: PSYCHIATRY & NEUROLOGY

## 2023-02-16 PROCEDURE — G8427 DOCREV CUR MEDS BY ELIG CLIN: HCPCS | Performed by: PSYCHIATRY & NEUROLOGY

## 2023-02-16 PROCEDURE — G8420 CALC BMI NORM PARAMETERS: HCPCS | Performed by: PSYCHIATRY & NEUROLOGY

## 2023-02-16 PROCEDURE — 1090F PRES/ABSN URINE INCON ASSESS: CPT | Performed by: PSYCHIATRY & NEUROLOGY

## 2023-02-16 PROCEDURE — G8536 NO DOC ELDER MAL SCRN: HCPCS | Performed by: PSYCHIATRY & NEUROLOGY

## 2023-02-16 PROCEDURE — 3074F SYST BP LT 130 MM HG: CPT | Performed by: PSYCHIATRY & NEUROLOGY

## 2023-02-16 PROCEDURE — 3017F COLORECTAL CA SCREEN DOC REV: CPT | Performed by: PSYCHIATRY & NEUROLOGY

## 2023-02-16 PROCEDURE — 1101F PT FALLS ASSESS-DOCD LE1/YR: CPT | Performed by: PSYCHIATRY & NEUROLOGY

## 2023-02-16 PROCEDURE — G9899 SCRN MAM PERF RSLTS DOC: HCPCS | Performed by: PSYCHIATRY & NEUROLOGY

## 2023-02-16 PROCEDURE — 3078F DIAST BP <80 MM HG: CPT | Performed by: PSYCHIATRY & NEUROLOGY

## 2023-02-16 PROCEDURE — G9717 DOC PT DX DEP/BP F/U NT REQ: HCPCS | Performed by: PSYCHIATRY & NEUROLOGY

## 2023-02-16 PROCEDURE — 1123F ACP DISCUSS/DSCN MKR DOCD: CPT | Performed by: PSYCHIATRY & NEUROLOGY

## 2023-02-16 RX ORDER — GABAPENTIN 300 MG/1
300 CAPSULE ORAL 2 TIMES DAILY
Qty: 60 CAPSULE | Refills: 3 | Status: SHIPPED | OUTPATIENT
Start: 2023-02-16

## 2023-02-16 NOTE — LETTER
2/16/2023    Patient: Chika Oneill   YOB: 1955   Date of Visit: 2/16/2023     Armando Winter [address] In [facility] [address]    Dear Anna Contreras MD,      Thank you for referring Ms. Chika Oneill to [facility] for evaluation. My notes for this consultation are attached. If you have questions, please do not hesitate to call me. I look forward to following your patient along with you.       Sincerely,    Elo Salazar MD

## 2023-02-27 ENCOUNTER — PATIENT MESSAGE (OUTPATIENT)
Dept: OBGYN CLINIC | Age: 68
End: 2023-02-27

## 2023-03-01 ENCOUNTER — OFFICE VISIT (OUTPATIENT)
Dept: OBGYN CLINIC | Age: 68
End: 2023-03-01

## 2023-03-01 VITALS
WEIGHT: 149.4 LBS | SYSTOLIC BLOOD PRESSURE: 116 MMHG | DIASTOLIC BLOOD PRESSURE: 78 MMHG | BODY MASS INDEX: 24.89 KG/M2 | HEIGHT: 65 IN

## 2023-03-01 DIAGNOSIS — Z01.419 ENCOUNTER FOR WELL WOMAN EXAM WITH ROUTINE GYNECOLOGICAL EXAM: Primary | ICD-10-CM

## 2023-03-01 DIAGNOSIS — Z12.4 CERVICAL CANCER SCREENING: ICD-10-CM

## 2023-03-01 DIAGNOSIS — R10.32 LLQ PAIN: ICD-10-CM

## 2023-03-01 DIAGNOSIS — Z11.51 ENCOUNTER FOR SCREENING FOR HUMAN PAPILLOMAVIRUS (HPV): ICD-10-CM

## 2023-03-01 DIAGNOSIS — N83.201 RIGHT OVARIAN CYST: ICD-10-CM

## 2023-03-01 NOTE — PROGRESS NOTES
Rebeca Bean is a 78 y.o. female returns for an annual exam     Chief Complaint   Patient presents with    Well Woman    Ultrasound       Patient's last menstrual period was 01/01/2009. Her periods are  absent patient is post menopausal    Problems: no significant problems  Birth Control: post menopausal status. Last Pap: normal obtained 10/2015. She does not have a history of DALILA 2, 3 or cervical cancer. Last Mammogram: had a recent mammogram 5/20225 which was negative for malignancy. Last Bone Density: abnormal  Osteopenia obtained 9/2022. Last colonoscopy: normal obtained 16 years ago. Ultrasound report:    TV AND TA ULTRASOUND PERFORMED. UTERUS IS RETROVERTED, NORMAL IN SIZE AND ECHOGENICITY. ENDOMETRIUM MEASURES 4-5MM IN THICKNESS. NO EVIDENCE OF MASSES OR ABNORMALITIES ARE SEEN. RIGHT OVARY APPEARS TO HAVE A SIMPLE CYST THAT MEASURES 31 X 30 X 26MM. LEFT ADNEXA APPEARS WITHIN NORMAL LIMITS. NO FREE FLUID SEEN IN THE CDS. 1. Have you been to the ER, urgent care clinic, or hospitalized since your last visit? No    2. Have you seen or consulted any other health care providers outside of the [address] [place] [address] since your last visit? 2/15/2023 Dr. Simon Tate neurologist. [facility].  2/16/2023 Dr. Jamie Ma neurologist. [facility].       Examination chaperoned by Jude Bloom MA.

## 2023-03-01 NOTE — PROGRESS NOTES
[facility]  [email or web address]  [phone]    Kary Olguin MD, [facility]     Annual Gynecologic Exam:  Chief Complaint   Patient presents with    Well Woman    Ultrasound       Karyn Rey is a ,  78 y.o. female   Patient's last menstrual period was 2009. She presents for her annual checkup. She is having significant LLQ, loose stool . Per Rooming Note:  Patient's last menstrual period was 2009. Her periods are  absent patient is post menopausal    Problems: no significant problems  Birth Control: post menopausal status. Last Pap: normal obtained 10/2015. She does not have a history of DALILA 2, 3 or cervical cancer. Last Mammogram: had a recent mammogram  which was negative for malignancy. Last Bone Density: abnormal  Osteopenia obtained 2022. Last colonoscopy: normal obtained 16 years ago. Ultrasound report:    TV AND TA ULTRASOUND PERFORMED. UTERUS IS RETROVERTED, NORMAL IN SIZE AND ECHOGENICITY. ENDOMETRIUM MEASURES 4-5MM IN THICKNESS. NO EVIDENCE OF MASSES OR ABNORMALITIES ARE SEEN. RIGHT OVARY APPEARS TO HAVE A SIMPLE CYST THAT MEASURES 31 X 30 X 26MM. LEFT ADNEXA APPEARS WITHIN NOR    Sexual history and Contraception:  Social History     Substance and Sexual Activity   Sexual Activity Yes    Partners: Female       Past Medical History:   Diagnosis Date    Allergic conjunctivitis of both eyes 10/19/2015    Brain tumor ([facility] [address]     Depression 2014    Essential hypertension 12/15/2017    Hot flashes 2014    Hot flashes     Hypercholesterolemia     Osteoarthritis of right knee 2014    Osteoarthritis of right knee 2014     Current Outpatient Medications   Medication Sig    gabapentin (NEURONTIN) 300 mg capsule Take 1 Capsule by mouth two (2) times a day.  Max Daily Amount: 600 mg.    ibuprofen (MOTRIN) 600 mg tablet TAKE ONE TABLET BY MOUTH EVERY 6 HOURS AS NEEDED FOR PAIN    denosumab (PROLIA) 60 mg/mL injection 60 mg by SubCUTAneous route every 6 months. propranolol LA (INDERAL LA) 60 mg SR capsule TAKE ONE CAPSULE BY MOUTH EVERY MORNING    calcium-cholecalciferol, D3, (CALTRATE 600+D) tablet Take 1 Tablet by mouth two (2) times a day. pravastatin (PRAVACHOL) 40 mg tablet TAKE ONE TABLET BY MOUTH ONCE NIGHTLY    multivitamin (ONE A DAY) tablet Take 1 Tablet by mouth in the morning. busPIRone (BUSPAR) 5 mg tablet TAKE ONE TABLET BY MOUTH TWICE A DAY     No current facility-administered medications for this visit.      OB History    Para Term  AB Living   2 2 2         SAB IAB Ectopic Molar Multiple Live Births             2      # Outcome Date GA Lbr Patrick/2nd Weight Sex Delivery Anes PTL Lv   2 Term            1 Term              Past Surgical History:   Procedure Laterality Date    ENDOSCOPY, COLON, DIAGNOSTIC      2 /10 normal    IR KYPHOPLASTY LUMBAR  10/25/2022     Family History   Problem Relation Age of Onset    Elevated Lipids Mother     Elevated Lipids Father     Hypertension Father     Elevated Lipids Sister      Social History     Socioeconomic History    Marital status:      Spouse name: Not on file    Number of children: Not on file    Years of education: Not on file    Highest education level: Not on file   Occupational History    Not on file   Tobacco Use    Smoking status: Former     Types: Cigarettes    Smokeless tobacco: Never    Tobacco comments:     for only a couple of years in her late teens   Vaping Use    Vaping Use: Never used   Substance and Sexual Activity    Alcohol use: Yes     Comment: 2 glasses of wine, 2-3 x a week    Drug use: No    Sexual activity: Yes     Partners: Female   Other Topics Concern    Not on file   Social History Narrative    Not on file     Social Determinants of Health     Financial Resource Strain: Not on file   Food Insecurity: Not on file   Transportation Needs: Not on file   Physical Activity: Not on file   Stress: Not on file Social Connections: Not on file   Intimate Partner Violence: Not on file   Housing Stability: Not on file     Tobacco History:  reports that she has quit smoking. Her smoking use included cigarettes. She has never used smokeless tobacco.  Alcohol Abuse:  reports current alcohol use. Drug Abuse:  reports no history of drug use. Allergies   Allergen Reactions    Effexor [Venlafaxine] Other (comments)     jittery       Patient Active Problem List   Diagnosis Code    Osteoporosis M81.0    Anxiety F41.9    Osteoarthritis of right knee M17.11    Hot flashes R23.2    Depression, major, recurrent, mild (HCC) F33.0    Hypercholesterolemia E78.00    Allergic conjunctivitis of both eyes H10.13    Essential hypertension I10    Acute left-sided low back pain without sciatica M54.50       Review of Systems - History obtained from the patient and patient filled out questionnaire   Constitutional/general, HEENT, CV, Resp, GI, MSK, Neuro, Psych, Heme/lymph, Skin, Breast ROS: no significant complaints except as noted on HPI    Physical Exam  Visit Vitals  /78   Ht 5' 5\" (1.651 m)   Wt 149 lb 6.4 oz (67.8 kg)   LMP 01/01/2009   BMI 24.86 kg/m²       Constitutional  Appearance: well-nourished, well developed, alert, in no acute distress    HENT  Head and Face: appears normal    Neck  Inspection/Palpation: normal appearance, no masses or tenderness  Lymph Nodes: no lymphadenopathy present  Thyroid: gland size normal, nontender, no nodules or masses present on palpation    Chest  Respiratory Effort: breathing unlabored  Auscultation: normal breath sounds    Cardiovascular  Heart:   Auscultation: regular rate and rhythm without murmur    Breasts  Inspection of Breasts: breasts symmetrical, no skin changes, no discharge present, nipple appearance normal, no skin retraction present  Palpation of Breasts and Axillae: no masses present on palpation, no breast tenderness  Axillary Lymph Nodes: no lymphadenopathy present    Gastrointestinal  Abdominal Examination: abdomen non-tender to palpation, normal bowel sounds, no masses present  Liver and spleen: no hepatomegaly present, spleen not palpable  Hernias: no hernias identified    Genitourinary  External Genitalia: normal appearance for age, no discharge present, no tenderness present, no inflammatory lesions present, no masses present  Vagina: normal vaginal vault without central or paravaginal defects, scant discharge present, no inflammatory lesions present, no masses present  Bladder: non-tender to palpation  Urethra: appears normal  Cervix: normal   Uterus: normal size, shape and consistency  Adnexa: no adnexal tenderness present, no adnexal masses present  Perineum: perineum within normal limits, no evidence of trauma, no rashes or skin lesions present  Anus: anus within normal limits, no hemorrhoids present  Inguinal Lymph Nodes: no lymphadenopathy present    Skin  General Inspection: no rash, no lesions identified    Neurologic/Psychiatric  Mental Status:  Orientation: grossly oriented to person, place and time  Mood and Affect: mood normal, affect appropriate    Assessment:  78 y.o.  for well woman exam  Her current medical status is satisfactory with no evidence of significant gynecologic issues. Encounter Diagnoses   Name Primary? Encounter for well woman exam with routine gynecological exam Yes    Encounter for screening for human papillomavirus (HPV)     Cervical cancer screening     Right ovarian cyst        Plan:  The patient was counseled about diet, exercise, healthy lifestyle  I recommend annual well woman exams  We discussed current pap smear and HR HPV testing guidelines.    I recommended follow up one year for routine annual gynecologic exam or sooner prn  Handouts were given to the patient  I recommended follow up with a primary care physician for chronic medical problems and evaluation of non-gynecologic concerns and to please contact our office with any GYN questions or concerns. I recommended testing per CDC guidelines and at patient request.     Prior imaging reviewed back to 2009 with similar right ov cyst.  We discussed potential causes of lower abdominal/pelvic pain: GYN, GI, , musculoskeletal, infectious process, adhesions, or other etiology  We discussed evaluation of lower abdominal/pelvic pain: including but not limited to observation, surgical evaluation/laparoscopy, imaging   We discussed treatment of lower abdominal/pelvic pain: including but not limited to: pain medication, hormonal management, surgical intervention, bowel regimen. Since pain can be a symptoms of an underlying abnormal process she is encouraged to contact my office with persistent symptoms for additional evaluation and treatment if needed. FU with PCP /GI for other causes of LLQ, likely not gyn  Repeat us x 1 year for stability. Folllow up:  [x] return for annual well woman exam in one year or sooner if she is having problems  [] follow up and ultrasound  [] 6 months  [] 3 months  [] 6 weeks   [] 1 month    Orders Placed This Encounter    PAP IG, APTIMA HPV AND RFX 16/18,45 ([id number])       No results found for any visits on 03/01/23.

## 2023-03-02 ENCOUNTER — HOSPITAL ENCOUNTER (OUTPATIENT)
Dept: PHYSICAL THERAPY | Age: 68
Discharge: HOME | End: 2023-03-02
Payer: MEDICARE

## 2023-03-02 PROCEDURE — 97110 THERAPEUTIC EXERCISES: CPT | Performed by: PHYSICAL THERAPIST

## 2023-03-02 PROCEDURE — 97161 PT EVAL LOW COMPLEX 20 MIN: CPT | Performed by: PHYSICAL THERAPIST

## 2023-03-02 PROCEDURE — 97014 ELECTRIC STIMULATION THERAPY: CPT | Performed by: PHYSICAL THERAPIST

## 2023-03-02 NOTE — PROGRESS NOTES
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 She had a kyphoplasty performed on T11 and L1 10/25/22 that went fine. She felt fine afterwards for a while, however the same pain has returned. She says that she is on gabapentin BID, but it isn't helping. Back pain is L sided, just as before. Thus far, no treatment has helped her pain. She feels pain whether she is active or not. So far, only lying down seems to be helpful.      Description of symptoms: L QL region pain  Aggravating Factors: doing chores around the house  Alleviating Factors: lying down, ice    OBJECTIVE/EXAMINATION  Posture:  reduced lumbar lordosis  Palpation: TTP over L QL, particularly just superior to the iliac crest and down into superior portion of iliacus laterally        Lumbar AROM:          R  L    Flexion    WFLs    Extension   75% loss      Side Bending   R SB elicits mild L sided pain         Mobility Assessment: lumbar P-A mobility: reduced     Lumbar rotational mobility: limited assessment, but limited mobility with L rotation    MMT: L hip              HIP ER: 4/5              HIP Abd: 4/5  Neurological: Reflexes / Sensations: nt  Special Tests: Forward Bend: WFLs       H.S. SLR: (-)         Modality rationale: decrease inflammation and decrease pain to improve the patients ability to perform household chores without difficulty   Min Type Additional Details   15 [x] Estim: []Att   [x]Unatt        []TENS instruct                  [x]IFC  []Premod   []NMES                     []Other:  []w/US   [x]w/ice   []w/heat  Position:  Location: back    []  Traction: [] Cervical       []Lumbar                       [] Prone          []Supine                       []Intermittent   []Continuous Lbs:  [] before manual  [] after manual  []w/heat    []  Ultrasound: []Continuous   [] Pulsed at:                           []1MHz   []3MHz Location:  W/cm2:    [] Paraffin         Location:   []w/heat    []  Ice     []  Heat  []  Ice massage Position:  Location:    []  Laser  []  Other: Position:  Location:      []  Vasopneumatic Device Pressure:       [] lo [] med [] hi   Temperature:      [x] Skin assessment post-treatment:  [x]intact []redness- no adverse reaction    []redness - adverse reaction:     10 min Therapeutic Exercise:  [x] See flow sheet : implemented HEP   Rationale: increase ROM and increase strength to improve the patients ability to travel with reduced pain    With   [] TE   [] TA   [] Neuro   [] SC   [] other: Patient Education: [x] Review HEP    [] Progressed/Changed HEP based on:   [] positioning   [] body mechanics   [] transfers   [] heat/ice application    [] other:      Other Objective/Functional Measures: FOTO Functional Measure: 44/100         Pain Level (0-10 scale) post treatment: 3    ASSESSMENT/Changes in Function:     [x]  See Plan of LEO Mays, DPT 3/2/2023

## 2023-03-02 NOTE — THERAPY EVALUATION
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 Assessment/ key information: Patient reports with recurrent, L sided low back pain. Symptoms consistent with L QL, paraspinal strain exacerbated by degenerative changes. There may potentially be some hip flexor involvement as well, and will continue to work on both these areas to help her prepare for upcoming trip.      Evaluation Complexity History MEDIUM  Complexity : 1-2 comorbidities / personal factors will impact the outcome/ POC ; Examination HIGH Complexity : 4+ Standardized tests and measures addressing body structure, function, activity limitation and / or participation in recreation  ;Presentation LOW Complexity : Stable, uncomplicated  ;Clinical Decision Making MEDIUM Complexity : FOTO score of 26-74  Overall Complexity Rating: LOW     Problem List: pain affecting function, decrease ROM, decrease strength, impaired gait/ balance, decrease ADL/ functional abilitiies, decrease activity tolerance, decrease flexibility/ joint mobility, and decrease transfer abilities   Treatment Plan may include any combination of the following: Therapeutic exercise, Neuromuscular reeducation, Manual therapy, Therapeutic activity, Self care/home management, and Electric stim unattended   Patient / Family readiness to learn indicated by: asking questions and trying to perform skills  Persons(s) to be included in education: patient (P)  Barriers to Learning/Limitations: None  Patient Goal (s): prepare for upcoming trip  Patient Self Reported Health Status: poor  Rehabilitation Potential: fair    Long Term Goals: To be accomplished in 10-12 treatments:   1. Pt will be able to self-manage care using updated HEP for improved independence   2. Improved FOTO score to 59 or better to demonstrate improved function   3. Pt will be able to perform light household activities without pain >3/10   4. Pt will get TENS unit for long term management of symptoms  Frequency / Duration: Patient to be seen 1-2 times per week for 10-12 treatments. Patient/ Caregiver education and instruction: self care and exercises    [x]  Plan of care has been reviewed with PTA      Certification Period: 3/2/23-6/2/23  Laurita Carrasco PT, DPT 3/2/2023     ________________________________________________________________________    I certify that the above Therapy Services are being furnished while the patient is under my care. I agree with the treatment plan and certify that this therapy is necessary.     Physician's Signature:____________________  Date:____________Time: _________         Raimundo Shook MD

## 2023-03-03 LAB
CYTOLOGIST CVX/VAG CYTO: NORMAL
CYTOLOGY CVX/VAG DOC CYTO: NORMAL
CYTOLOGY CVX/VAG DOC THIN PREP: NORMAL
CYTOLOGY HISTORY:: NORMAL
DX ICD CODE: NORMAL
LABCORP, 190119: NORMAL
Lab: NORMAL
OTHER STN SPEC: NORMAL
STAT OF ADQ CVX/VAG CYTO-IMP: NORMAL

## 2023-03-05 NOTE — PROGRESS NOTES
Normal pap smear, message sent if [facility] active. Update PMH/: include: Date of pap, Cytology: wnl. For HR HPV results: list NEG or POS, when done.

## 2023-03-08 ENCOUNTER — HOSPITAL ENCOUNTER (OUTPATIENT)
Dept: PHYSICAL THERAPY | Age: 68
Discharge: HOME | End: 2023-03-08
Payer: MEDICARE

## 2023-03-08 PROCEDURE — 97110 THERAPEUTIC EXERCISES: CPT | Performed by: PHYSICAL THERAPIST

## 2023-03-08 PROCEDURE — 97016 VASOPNEUMATIC DEVICE THERAPY: CPT

## 2023-03-08 PROCEDURE — 97014 ELECTRIC STIMULATION THERAPY: CPT

## 2023-03-08 PROCEDURE — 97112 NEUROMUSCULAR REEDUCATION: CPT

## 2023-03-08 PROCEDURE — 97140 MANUAL THERAPY 1/> REGIONS: CPT

## 2023-03-08 NOTE — PROGRESS NOTES
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      OBJECTIVE    Modality rationale: decrease edema, decrease inflammation, and decrease pain to improve the patients ability to decrease back pain   Min Type Additional Details      10 [x] Estim: []Att   [x]Unatt    []TENS instruct                  [x]IFC  []Premod   []NMES                     []Other:  []w/US   [x]w/ice   []w/heat  Position:supine with bolster  Location: back       []  Traction: [] Cervical       []Lumbar                       [] Prone          []Supine                       []Intermittent   []Continuous Lbs:  [] before manual  [] after manual  []w/heat    []  Ultrasound: []Continuous   [] Pulsed                       at: []1MHz   []3MHz Location:  W/cm2:    [] Paraffin         Location:   []w/heat    []  Ice     []  Heat  []  Ice massage Position:  Location:    []  Laser  []  Other: Position:  Location:      []  Vasopneumatic Device Pressure:       [] lo [] med [] hi   Temperature:      [x] Skin assessment post-treatment:  [x]intact []redness- no adverse reaction    []redness - adverse reaction:     22 min Therapeutic Exercise:  [x] See flow sheet :   Rationale: increase ROM, increase strength, improve coordination, improve balance, and increase proprioception to improve the patients ability to increase function and mobility       20 min Neuromuscular Re-education:  [x]  See flow sheet : PPT with and without marches. Glut sq in various positions. Rationale: increase ROM, increase strength, improve coordination, improve balance, and increase proprioception  to improve the patients ability to increase core stability    15 min Manual Therapy: STM/MFR L lumbar paraspinals and QL    Rationale: decrease pain, increase ROM, increase tissue extensibility, decrease trigger points, and increase postural awareness to improve the patients ability to increase mobility          With   [] TE   [] TA   [] Neuro   [] SC   [] other: Patient Education: [x] Review HEP    [] Progressed/Changed HEP based on:   [] positioning   [] body mechanics   [] transfers   [] heat/ice application    [] other:      Other Objective/Functional Measures: --     Pain Level (0-10 scale) post treatment: 2-3/10    ASSESSMENT/Changes in Function:   Pt tolerated session well. Discussion of supine to sit transfer to off load stress on back. Education on doing PPT and glut sq in sitting and standing to maximize rehab benefits and performance training in various positions to prepare to trip to [facility]  Patient will continue to benefit from skilled PT services to modify and progress therapeutic interventions, address functional mobility deficits, address ROM deficits, address strength deficits, analyze and address soft tissue restrictions, analyze and cue movement patterns, analyze and modify body mechanics/ergonomics, and assess and modify postural abnormalities to attain remaining goals. []  See Plan of Care  []  See progress note/recertification  []  See Discharge Summary         Progress towards goals / Updated goals:  Long Term Goals:  To be accomplished in 10-12 treatments:              1. Pt will be able to self-manage care using updated HEP for improved independence              2. Improved FOTO score to 59 or better to demonstrate improved function              3. Pt will be able to perform light household activities without pain >3/10              4. Pt will get TENS unit for long term management of symptoms  Frequency / Duration: Patient to be seen 1-2 times per week for 10-12 treatments.     PLAN  []  Upgrade activities as tolerated     []  Continue plan of care  []  Update interventions per flow sheet       []  Discharge due to:_  []  Other:_      Brigido Salcido, TATY 3/8/2023

## 2023-03-14 ENCOUNTER — HOSPITAL ENCOUNTER (OUTPATIENT)
Dept: PHYSICAL THERAPY | Age: 68
Discharge: HOME | End: 2023-03-14
Payer: MEDICARE

## 2023-03-14 PROCEDURE — 97110 THERAPEUTIC EXERCISES: CPT

## 2023-03-14 PROCEDURE — 97112 NEUROMUSCULAR REEDUCATION: CPT

## 2023-03-14 PROCEDURE — 97140 MANUAL THERAPY 1/> REGIONS: CPT

## 2023-03-14 PROCEDURE — 97014 ELECTRIC STIMULATION THERAPY: CPT

## 2023-03-14 NOTE — PROGRESS NOTES
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      OBJECTIVE    Modality rationale: decrease edema, decrease inflammation, and decrease pain to improve the patients ability to decrease back pain   Min Type Additional Details      15 [x] Estim: []Att   [x]Unatt    []TENS instruct                  [x]IFC  []Premod   []NMES                     []Other:  []w/US   [x]w/ice   []w/heat  Position:supine with bolster  Location: back       []  Traction: [] Cervical       []Lumbar                       [] Prone          []Supine                       []Intermittent   []Continuous Lbs:  [] before manual  [] after manual  []w/heat    []  Ultrasound: []Continuous   [] Pulsed                       at: []1MHz   []3MHz Location:  W/cm2:    [] Paraffin         Location:   []w/heat    []  Ice     []  Heat  []  Ice massage Position:  Location:    []  Laser  []  Other: Position:  Location:      []  Vasopneumatic Device Pressure:       [] lo [] med [] hi   Temperature:      [x] Skin assessment post-treatment:  [x]intact []redness- no adverse reaction    []redness - adverse reaction:     31 min Therapeutic Exercise:  [x] See flow sheet :   Rationale: increase ROM, increase strength, improve coordination, improve balance, and increase proprioception to improve the patients ability to increase function and mobility       15 min Neuromuscular Re-education:  [x]  See flow sheet : PPT with and without marches. Core press and bridge with band, standing hip ABD   Rationale: increase ROM, increase strength, improve coordination, improve balance, and increase proprioception  to improve the patients ability to increase core stability    12 min Manual Therapy: STM/MFR L lumbar paraspinals and QL    Rationale: decrease pain, increase ROM, increase tissue extensibility, decrease trigger points, and increase postural awareness to improve the patients ability to increase mobility          With   [] TE   [] TA   [] Neuro   [] SC   [] other: Patient Education: [x] Review HEP    [] Progressed/Changed HEP based on:   [] positioning   [] body mechanics   [] transfers   [] heat/ice application    [] other:      Other Objective/Functional Measures: --     Pain Level (0-10 scale) post treatment: 0/10    ASSESSMENT/Changes in Function:   Pt challenged with advancement of core and hip stability work today. Does require tactile cues for QL compensation with standing hip ABD. Patient will continue to benefit from skilled PT services to modify and progress therapeutic interventions, address functional mobility deficits, address ROM deficits, address strength deficits, analyze and address soft tissue restrictions, analyze and cue movement patterns, analyze and modify body mechanics/ergonomics, and assess and modify postural abnormalities to attain remaining goals. []  See Plan of Care  []  See progress note/recertification  []  See Discharge Summary         Progress towards goals / Updated goals:  Long Term Goals:  To be accomplished in 10-12 treatments:              1. Pt will be able to self-manage care using updated HEP for improved independence              2. Improved FOTO score to 59 or better to demonstrate improved function              3. Pt will be able to perform light household activities without pain >3/10              4. Pt will get TENS unit for long term management of symptoms  Frequency / Duration: Patient to be seen 1-2 times per week for 10-12 treatments.     PLAN  []  Upgrade activities as tolerated     []  Continue plan of care  []  Update interventions per flow sheet       []  Discharge due to:_  []  Other:_      Jordon Mtz PTA 3/14/2023

## 2023-03-16 ENCOUNTER — HOSPITAL ENCOUNTER (OUTPATIENT)
Dept: PHYSICAL THERAPY | Age: 68
Discharge: HOME | End: 2023-03-16
Payer: MEDICARE

## 2023-03-16 PROCEDURE — 97140 MANUAL THERAPY 1/> REGIONS: CPT | Performed by: PHYSICAL THERAPIST

## 2023-03-16 PROCEDURE — 97110 THERAPEUTIC EXERCISES: CPT | Performed by: PHYSICAL THERAPIST

## 2023-03-16 PROCEDURE — 97014 ELECTRIC STIMULATION THERAPY: CPT | Performed by: PHYSICAL THERAPIST

## 2023-03-16 PROCEDURE — 97112 NEUROMUSCULAR REEDUCATION: CPT | Performed by: PHYSICAL THERAPIST

## 2023-03-16 NOTE — PROGRESS NOTES
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 Feeling better after her PT sessions.      OBJECTIVE    Modality rationale: decrease edema, decrease inflammation, and decrease pain to improve the patients ability to decrease back pain    Min Type Additional Details      15 [x] Estim: []Att   [x]Unatt    []TENS instruct                  [x]IFC  []Premod   []NMES                     []Other:  []w/US   [x]w/ice   []w/heat  Position:supine with bolster  Location: back        []  Traction: [] Cervical       []Lumbar                       [] Prone          []Supine                       []Intermittent   []Continuous Lbs:  [] before manual  [] after manual  []w/heat     []  Ultrasound: []Continuous   [] Pulsed                       at: []1MHz   []3MHz Location:  W/cm2:     [] Paraffin         Location:   []w/heat     []  Ice     []  Heat  []  Ice massage Position:  Location:     []  Laser  []  Other: Position:  Location:        []  Vasopneumatic Device Pressure:       [] lo [] med [] hi   Temperature:       [x] Skin assessment post-treatment:  [x]intact []redness- no adverse reaction    []redness - adverse reaction:      20 min Therapeutic Exercise:  [x] See flow sheet :   Rationale: increase ROM, increase strength, improve coordination, improve balance, and increase proprioception to improve the patients ability to increase function and mobility        15 min Neuromuscular Re-education:  [x]  See flow sheet : PPT with and without marches. Core press and bridge with band, standing hip ABD   Rationale: increase ROM, increase strength, improve coordination, improve balance, and increase proprioception  to improve the patients ability to increase core stability     15 min Manual Therapy: STM/MFR L lumbar paraspinals and QL    Rationale: decrease pain, increase ROM, increase tissue extensibility, decrease trigger points, and increase postural awareness to improve the patients ability to increase mobility                                                                 With   [] TE   [] TA   [] Neuro   [] SC   [] other: Patient Education: [x] Review HEP    [] Progressed/Changed HEP based on:   [] positioning   [] body mechanics   [] transfers   [] heat/ice application    [] other:       Other Objective/Functional Measures: --        Pain Level (0-10 scale) post treatment: 0/10     ASSESSMENT/Changes in Function:   Maintaining good progress over the last few sessions, which is encouraging. Will continue with 2 more sessions prior to her trip. Patient will continue to benefit from skilled PT services to modify and progress therapeutic interventions, address functional mobility deficits, address ROM deficits, address strength deficits, analyze and address soft tissue restrictions, analyze and cue movement patterns, analyze and modify body mechanics/ergonomics, and assess and modify postural abnormalities to attain remaining goals. []  See Plan of Care  []  See progress note/recertification  []  See Discharge Summary         Progress towards goals / Updated goals:  Long Term Goals:  To be accomplished in 10-12 treatments:              1. Pt will be able to self-manage care using updated HEP for improved independence              2. Improved FOTO score to 59 or better to demonstrate improved function              3. Pt will be able to perform light household activities without pain >3/10              4. Pt will get TENS unit for long term management of symptoms    Frequency / Duration: Patient to be seen 1-2 times per week for 10-12 treatments.      PLAN  [x]  Upgrade activities as tolerated     [x]  Continue plan of care  []  Update interventions per flow sheet       []  Discharge due to:_  []  Other:_      Celeste Mcclure, PT, DPT 3/16/2023

## 2023-03-20 ENCOUNTER — HOSPITAL ENCOUNTER (OUTPATIENT)
Dept: PHYSICAL THERAPY | Age: 68
Discharge: HOME | End: 2023-03-20
Payer: MEDICARE

## 2023-03-20 PROCEDURE — 97140 MANUAL THERAPY 1/> REGIONS: CPT | Performed by: PHYSICAL THERAPIST

## 2023-03-20 PROCEDURE — 97014 ELECTRIC STIMULATION THERAPY: CPT | Performed by: PHYSICAL THERAPIST

## 2023-03-20 PROCEDURE — 97110 THERAPEUTIC EXERCISES: CPT | Performed by: PHYSICAL THERAPIST

## 2023-03-20 PROCEDURE — 97112 NEUROMUSCULAR REEDUCATION: CPT | Performed by: PHYSICAL THERAPIST

## 2023-03-20 NOTE — PROGRESS NOTES
PT DAILY TREATMENT NOTE - 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 She is feeling a little better today.      OBJECTIVE           Modality rationale: decrease edema, decrease inflammation, and decrease pain to improve the patients ability to decrease back pain     Min Type Additional Details      15 [x] Estim: []Att   [x]Unatt    []TENS instruct                  [x]IFC  []Premod   []NMES                     []Other:  []w/US   [x]w/ice   []w/heat  Position:supine with bolster  Location: back        []  Traction: [] Cervical       []Lumbar                       [] Prone          []Supine                       []Intermittent   []Continuous Lbs:  [] before manual  [] after manual  []w/heat     []  Ultrasound: []Continuous   [] Pulsed                       at: []1MHz   []3MHz Location:  W/cm2:     [] Paraffin         Location:   []w/heat     []  Ice     []  Heat  []  Ice massage Position:  Location:     []  Laser  []  Other: Position:  Location:        []  Vasopneumatic Device Pressure:       [] lo [] med [] hi   Temperature:       [x] Skin assessment post-treatment:  [x]intact []redness- no adverse reaction    []redness - adverse reaction:      16 min Therapeutic Exercise:  [x] See flow sheet :   Rationale: increase ROM, increase strength, improve coordination, improve balance, and increase proprioception to improve the patients ability to increase function and mobility        15 min Neuromuscular Re-education:  [x]  See flow sheet : PPT with and without marches. Core press and bridge with band   Rationale: increase ROM, increase strength, improve coordination, improve balance, and increase proprioception  to improve the patients ability to increase core stability     25 min Manual Therapy: STM/MFR L lumbar paraspinals and QL, sacral caudal glide, gentle lumbar rotation and gapping stretching    Rationale: decrease pain, increase ROM, increase tissue extensibility, decrease trigger points, and increase postural awareness to improve the patients ability to increase mobility                                                                 With   [] TE   [] TA   [] Neuro   [] SC   [] other: Patient Education: [x] Review HEP    [] Progressed/Changed HEP based on:   [] positioning   [] body mechanics   [] transfers   [] heat/ice application    [] other:       Other Objective/Functional Measures: --        Pain Level (0-10 scale) post treatment: 0/10     ASSESSMENT/Changes in Function:   Even though she had a flare up over the weekend, pain was reduced by the time she had come into clinic, which is good. One more session prior to trip. Patient will continue to benefit from skilled PT services to modify and progress therapeutic interventions, address functional mobility deficits, address ROM deficits, address strength deficits, analyze and address soft tissue restrictions, analyze and cue movement patterns, analyze and modify body mechanics/ergonomics, and assess and modify postural abnormalities to attain remaining goals.      []  See Plan of Care  []  See progress note/recertification  []  See Discharge Summary         Progress towards goals / Updated goals:  Long Term Goals: To be accomplished in 10-12 treatments:              1. Pt will be able to self-manage care using updated HEP for improved independence              2. Improved FOTO score to 59 or better to demonstrate improved function              3. Pt will be able to perform light household activities without pain >3/10              4. Pt will get TENS unit for long term management of symptoms     Frequency / Duration: Patient to be seen 1-2 times per week for 10-12 treatments.      PLAN  [x]  Upgrade activities as tolerated     [x]  Continue plan of care  []  Update interventions per flow sheet       []  Discharge due to:_  []  Other:_      Lesvia Garces, PT, DPT 3/20/2023

## 2023-03-22 ENCOUNTER — HOSPITAL ENCOUNTER (OUTPATIENT)
Dept: PHYSICAL THERAPY | Age: 68
Discharge: HOME | End: 2023-03-22
Payer: MEDICARE

## 2023-03-22 PROCEDURE — 97140 MANUAL THERAPY 1/> REGIONS: CPT | Performed by: PHYSICAL THERAPIST

## 2023-03-22 PROCEDURE — 97112 NEUROMUSCULAR REEDUCATION: CPT | Performed by: PHYSICAL THERAPIST

## 2023-03-22 NOTE — PROGRESS NOTES
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      OBJECTIVE     20 min Neuromuscular Re-education:  [x]  See flow sheet : PPT, glut squeezes, clamshells   Rationale: increase ROM, increase strength, improve coordination, improve balance, and increase proprioception  to improve the patients ability to increase core stability     25 min Manual Therapy: STM/MFR L lumbar paraspinals and QL, sacral caudal glide, gentle lumbar rotation and gapping stretching    Rationale: decrease pain, increase ROM, increase tissue extensibility, decrease trigger points, and increase postural awareness to improve the patients ability to increase mobility                                                                 With   [] TE   [] TA   [] Neuro   [] SC   [] other: Patient Education: [x] Review HEP    [] Progressed/Changed HEP based on:   [] positioning   [] body mechanics   [] transfers   [] heat/ice application    [] other:       Other Objective/Functional Measures: --        Pain Level (0-10 scale) post treatment: 0/10     ASSESSMENT/Changes in Function:   Did well today, but held on some exercises to allow her to prepare for her trip to [place] this Friday. May follow up after she returns in 3 weeks. Patient will continue to benefit from skilled PT services to modify and progress therapeutic interventions, address functional mobility deficits, address ROM deficits, address strength deficits, analyze and address soft tissue restrictions, analyze and cue movement patterns, analyze and modify body mechanics/ergonomics, and assess and modify postural abnormalities to attain remaining goals. []  See Plan of Care  []  See progress note/recertification  []  See Discharge Summary         Progress towards goals / Updated goals:  Long Term Goals: To be accomplished in 10-12 treatments:              1. Pt will be able to self-manage care using updated HEP for improved independence              2. Improved FOTO score to 59 or better to demonstrate improved function              3. Pt will be able to perform light household activities without pain >3/10              4. Pt will get TENS unit for long term management of symptoms     Frequency / Duration: Patient to be seen 1-2 times per week for 10-12 treatments.      PLAN  [x]  Upgrade activities as tolerated     [x]  Continue plan of care  []  Update interventions per flow sheet       []  Discharge due to:_  []  Other:_      Silvino Sanford, PT, DPT 3/22/2023

## 2023-04-17 ENCOUNTER — DOCUMENTATION ONLY (OUTPATIENT)
Dept: FAMILY MEDICINE CLINIC | Age: 68
End: 2023-04-17

## 2023-04-17 ENCOUNTER — NURSE TRIAGE (OUTPATIENT)
Dept: OTHER | Facility: CLINIC | Age: 68
End: 2023-04-17

## 2023-04-17 NOTE — TELEPHONE ENCOUNTER
Patient's daughter called abut her bilateral ankle swelling. Patient is currently in [place] and cannot be traiged. Daughter wants to make an appt. Warm transfer to [place] at the [facility] for scheduling the soonest available. Daughter advised sahra back tomorrow when the patient is [place] the states if she needs us.

## 2023-04-17 NOTE — PROGRESS NOTES
Pt's daughter Yazmin Rojo called to schedule appointment for pt currently in [place] complaining of lower leg swelling from calves down to ankles. Pt's daughter says pt denies chest pain, shortness of breath, redness of extremities, denies it being hot to touch, and will go to ER if these symptoms occur. Pt flying in tomorrow at with ETA of 6:15 pm and was scheduled for first available appointment of 8:00 am with Dr. Goncalves Degree for evaluation and treatment. Yazmin Rojo was also given information for [facility] if pt wants to be seen at home tomorrow and they have availability.  Tyrell

## 2023-04-19 ENCOUNTER — OFFICE VISIT (OUTPATIENT)
Dept: FAMILY MEDICINE CLINIC | Age: 68
End: 2023-04-19
Payer: MEDICARE

## 2023-04-19 VITALS
WEIGHT: 154 LBS | RESPIRATION RATE: 20 BRPM | BODY MASS INDEX: 25.66 KG/M2 | DIASTOLIC BLOOD PRESSURE: 77 MMHG | SYSTOLIC BLOOD PRESSURE: 128 MMHG | HEIGHT: 65 IN | HEART RATE: 66 BPM | OXYGEN SATURATION: 98 % | TEMPERATURE: 97.6 F

## 2023-04-19 DIAGNOSIS — R60.0 BILATERAL LOWER EXTREMITY EDEMA: ICD-10-CM

## 2023-04-19 DIAGNOSIS — D75.89 MACROCYTOSIS: ICD-10-CM

## 2023-04-19 DIAGNOSIS — D53.9 MACROCYTIC ANEMIA: ICD-10-CM

## 2023-04-19 DIAGNOSIS — R06.83 SNORING: Primary | ICD-10-CM

## 2023-04-19 PROCEDURE — 99214 OFFICE O/P EST MOD 30 MIN: CPT | Performed by: FAMILY MEDICINE

## 2023-04-19 PROCEDURE — 1123F ACP DISCUSS/DSCN MKR DOCD: CPT | Performed by: FAMILY MEDICINE

## 2023-04-19 PROCEDURE — 1090F PRES/ABSN URINE INCON ASSESS: CPT | Performed by: FAMILY MEDICINE

## 2023-04-19 PROCEDURE — G8427 DOCREV CUR MEDS BY ELIG CLIN: HCPCS | Performed by: FAMILY MEDICINE

## 2023-04-19 PROCEDURE — 3017F COLORECTAL CA SCREEN DOC REV: CPT | Performed by: FAMILY MEDICINE

## 2023-04-19 PROCEDURE — G8417 CALC BMI ABV UP PARAM F/U: HCPCS | Performed by: FAMILY MEDICINE

## 2023-04-19 PROCEDURE — G8536 NO DOC ELDER MAL SCRN: HCPCS | Performed by: FAMILY MEDICINE

## 2023-04-19 PROCEDURE — 3074F SYST BP LT 130 MM HG: CPT | Performed by: FAMILY MEDICINE

## 2023-04-19 PROCEDURE — G9717 DOC PT DX DEP/BP F/U NT REQ: HCPCS | Performed by: FAMILY MEDICINE

## 2023-04-19 PROCEDURE — 3078F DIAST BP <80 MM HG: CPT | Performed by: FAMILY MEDICINE

## 2023-04-19 PROCEDURE — G9899 SCRN MAM PERF RSLTS DOC: HCPCS | Performed by: FAMILY MEDICINE

## 2023-04-19 PROCEDURE — 1101F PT FALLS ASSESS-DOCD LE1/YR: CPT | Performed by: FAMILY MEDICINE

## 2023-04-19 RX ORDER — FUROSEMIDE 20 MG/1
20 TABLET ORAL 2 TIMES DAILY
Qty: 60 TABLET | Refills: 0 | Status: SHIPPED | OUTPATIENT
Start: 2023-04-19 | End: 2023-05-19

## 2023-04-19 NOTE — PROGRESS NOTES
Chief Complaint   Patient presents with    Swelling     Both legs - was given a diuretic in [facility], it did help, pt does not know the name of the medication       Has had swelling x 3 weeks- pt sees improvement he morning but swelling  again by the afternoon.

## 2023-04-19 NOTE — ASSESSMENT & PLAN NOTE
improved, etiology unknown. DDx: venous insufficiency, dependent edema from inactivity, signs of heart failure, liver or renal disease less likely. 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 Get CBC, CMP, DVT ultrasound, ECHO (give hx L axis deviation), EKG, sleep study for snoring and sleep troubles. Provide lasix prescription, 20mg daily to BID prn swelling, follow up in a few weeks to ensure improvement and review studies.

## 2023-04-19 NOTE — PROGRESS NOTES
Boris Zhao (: 1955) is a 78 y.o. female, established patient, here for evaluation of the following chief complaint(s):  Swelling (Both legs - was given a diuretic in [facility], it did help, pt does not know the name of the medication  )       ASSESSMENT/PLAN:  Below is the assessment and plan developed based on review of pertinent history, physical exam, labs, studies, and medications. 1. Snoring  -     SLEEP MEDICINE REFERRAL  2. Bilateral lower extremity edema  Assessment & Plan:   improved, etiology unknown. DDx: venous insufficiency, dependent edema from inactivity, signs of heart failure, liver or renal disease less likely. 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 Get CBC, CMP, DVT ultrasound, ECHO (give hx L axis deviation), EKG, sleep study for snoring and sleep troubles. Provide lasix prescription, 20mg daily to BID prn swelling, follow up in a few weeks to ensure improvement and review studies. Orders:  -     DUPLEX LOWER EXT VENOUS BILAT; Future  -     ECHO ADULT COMPLETE; Future  -     SLEEP MEDICINE REFERRAL  -     PROTEIN/CREATININE RATIO, URINE; Future  -     METABOLIC PANEL, COMPREHENSIVE; Future  -     CBC W/O DIFF; Future      Return in about 3 weeks (around 5/10/2023) for leg edema management. SUBJECTIVE/OBJECTIVE:  HPI  Chronic Conditions Addressed Today       1. Bilateral lower extremity edema     Overview      New acute onset 3 weeks ago after 7 hour plane ride across [facility] to [place]. Started lasix 2 weeks into swelling, which in combination with compression stockings has improved greatly, though still present. Denies and chest complaints such as pain, dyspnea, lightheadedness, N/V, epigastric discomfort. Denies decrased urinary output, foamy urine, hematuria, dysuria, flank pain. Denies abdominal swelling, hx of alcohol use, RUQ abdominal pain, skin yellowing.   No fever/chills. Does have hx of snoring, poor sleep. EKG in 2021 showed some L axis deviation and nonspecific ST-T changes, no prior EKG. Lab Results   Component Value Date/Time    WBC 5.3 02/19/2019 11:46 AM    HGB 14.0 02/19/2019 11:46 AM    HCT 42.2 02/19/2019 11:46 AM    PLATELET 018 12/19/3813 11:46 AM    MCV 98 (H) 02/19/2019 11:46 AM     Lab Results   Component Value Date/Time    Sodium 139 04/27/2022 11:40 AM    Potassium 3.8 04/27/2022 11:40 AM    Chloride 104 04/27/2022 11:40 AM    CO2 28 04/27/2022 11:40 AM    Anion gap 7 04/27/2022 11:40 AM    Glucose 102 (H) 04/27/2022 11:40 AM    BUN 7 04/27/2022 11:40 AM    Creatinine 0.57 04/27/2022 11:40 AM    BUN/Creatinine ratio 12 04/27/2022 11:40 AM    GFR est AA >60 04/27/2022 11:40 AM    GFR est non-AA >60 04/27/2022 11:40 AM    Calcium 9.0 04/27/2022 11:40 AM     Lab Results   Component Value Date/Time    ALT (SGPT) 61 04/27/2022 11:40 AM    AST (SGOT) 67 (H) 04/27/2022 11:40 AM    Alk. phosphatase 91 04/27/2022 11:40 AM    Bilirubin, direct 0.3 (H) 04/27/2022 11:40 AM    Bilirubin, total 0.9 04/27/2022 11:40 AM                                                                       Acute Diagnoses Addressed Today       Snoring    -  Primary        Relevant Orders        SLEEP MEDICINE REFERRAL              Review of Systems  As per HPI    Physical Exam  Visit Vitals  /77   Pulse 66   Temp 97.6 °F (36.4 °C) (Temporal)   Resp 20   Ht 5' 5\" (1.651 m)   Wt 154 lb (69.9 kg)   SpO2 98%   BMI 25.63 kg/m²     Constitutional: well-appearing, no acute distress  Eyes: EOM intact. PERRL bilateral. Not icteric. Cardiac: normal rate, regular rhythm. No murmurs, rubs, or gallops. Pulm: normal rate, normal effort. CTAB, no wheezing, rales or rhonchi. Skin: normal color and turgor. Abd: flat, soft, nontender, nondistended, no organomegaly appreciated. Lower extrem: 1+ bilateral pitting edema.        An electronic signature was used to authenticate this note.  -- Luis Angel Ordoñez MD

## 2023-04-27 ENCOUNTER — TRANSCRIBE ORDER (OUTPATIENT)
Dept: SCHEDULING | Age: 68
End: 2023-04-27

## 2023-04-27 DIAGNOSIS — Z12.31 SCREENING MAMMOGRAM FOR HIGH-RISK PATIENT: Primary | ICD-10-CM

## 2023-05-04 ENCOUNTER — HOSPITAL ENCOUNTER (OUTPATIENT)
Dept: NON INVASIVE DIAGNOSTICS | Age: 68
Discharge: HOME | End: 2023-05-04
Attending: FAMILY MEDICINE
Payer: MEDICARE

## 2023-05-04 ENCOUNTER — HOSPITAL ENCOUNTER (OUTPATIENT)
Dept: VASCULAR SURGERY | Age: 68
Discharge: HOME | End: 2023-05-04
Attending: FAMILY MEDICINE
Payer: MEDICARE

## 2023-05-04 VITALS
WEIGHT: 154.1 LBS | BODY MASS INDEX: 25.67 KG/M2 | DIASTOLIC BLOOD PRESSURE: 74 MMHG | SYSTOLIC BLOOD PRESSURE: 107 MMHG | HEIGHT: 65 IN

## 2023-05-04 DIAGNOSIS — R60.0 BILATERAL LOWER EXTREMITY EDEMA: ICD-10-CM

## 2023-05-04 LAB
ECHO AO ARCH DIAM: 2.4 CM
ECHO AO ASC DIAM: 3.3 CM
ECHO AO ASCENDING AORTA INDEX: 1.86 CM/M2
ECHO AV AREA PEAK VELOCITY: 3.3 CM2
ECHO AV AREA/BSA PEAK VELOCITY: 1.9 CM2/M2
ECHO AV PEAK GRADIENT: 6 MMHG
ECHO AV PEAK VELOCITY: 1.2 M/S
ECHO AV VELOCITY RATIO: 0.83
ECHO LA DIAMETER INDEX: 2.49 CM/M2
ECHO LA DIAMETER: 4.4 CM
ECHO LA VOL 2C: 31 ML (ref 22–52)
ECHO LA VOL 4C: 25 ML (ref 22–52)
ECHO LA VOL BP: 28 ML (ref 22–52)
ECHO LA VOL/BSA BIPLANE: 16 ML/M2 (ref 16–34)
ECHO LA VOLUME AREA LENGTH: 30 ML
ECHO LA VOLUME INDEX A2C: 18 ML/M2 (ref 16–34)
ECHO LA VOLUME INDEX A4C: 14 ML/M2 (ref 16–34)
ECHO LA VOLUME INDEX AREA LENGTH: 17 ML/M2 (ref 16–34)
ECHO LV E' LATERAL VELOCITY: 8 CM/S
ECHO LV E' SEPTAL VELOCITY: 7 CM/S
ECHO LV FRACTIONAL SHORTENING: 35 % (ref 28–44)
ECHO LV INTERNAL DIMENSION DIASTOLE INDEX: 3.11 CM/M2
ECHO LV INTERNAL DIMENSION DIASTOLIC: 5.5 CM (ref 3.9–5.3)
ECHO LV INTERNAL DIMENSION SYSTOLIC INDEX: 2.03 CM/M2
ECHO LV INTERNAL DIMENSION SYSTOLIC: 3.6 CM
ECHO LV IVSD: 0.7 CM (ref 0.6–0.9)
ECHO LV MASS 2D: 135.5 G (ref 67–162)
ECHO LV MASS INDEX 2D: 76.6 G/M2 (ref 43–95)
ECHO LV POSTERIOR WALL DIASTOLIC: 0.7 CM (ref 0.6–0.9)
ECHO LV RELATIVE WALL THICKNESS RATIO: 0.25
ECHO LVOT AREA: 3.8 CM2
ECHO LVOT DIAM: 2.2 CM
ECHO LVOT MEAN GRADIENT: 2 MMHG
ECHO LVOT PEAK GRADIENT: 4 MMHG
ECHO LVOT PEAK VELOCITY: 1 M/S
ECHO LVOT STROKE VOLUME INDEX: 51.1 ML/M2
ECHO LVOT SV: 90.4 ML
ECHO LVOT VTI: 23.8 CM
ECHO MV A VELOCITY: 0.38 M/S
ECHO MV E DECELERATION TIME (DT): 224.4 MS
ECHO MV E VELOCITY: 0.74 M/S
ECHO MV E/A RATIO: 1.95
ECHO MV E/E' LATERAL: 9.25
ECHO MV E/E' RATIO (AVERAGED): 9.91
ECHO MV E/E' SEPTAL: 10.57
ECHO MV REGURGITANT PEAK GRADIENT: 71 MMHG
ECHO MV REGURGITANT PEAK VELOCITY: 4.2 M/S
ECHO PULMONARY ARTERY END DIASTOLIC PRESSURE: 5 MMHG
ECHO PV MAX VELOCITY: 0.8 M/S
ECHO PV PEAK GRADIENT: 3 MMHG
ECHO PV REGURGITANT MAX VELOCITY: 1.1 M/S
ECHO RV FREE WALL PEAK S': 12 CM/S
ECHO RV INTERNAL DIMENSION: 3.2 CM
ECHO RV TAPSE: 2 CM (ref 1.7–?)
ECHO TV REGURGITANT MAX VELOCITY: 2.87 M/S
ECHO TV REGURGITANT PEAK GRADIENT: 33 MMHG

## 2023-05-04 PROCEDURE — 93306 TTE W/DOPPLER COMPLETE: CPT

## 2023-05-04 PROCEDURE — 93970 EXTREMITY STUDY: CPT

## 2023-05-04 PROCEDURE — 93306 TTE W/DOPPLER COMPLETE: CPT | Performed by: SPECIALIST

## 2023-05-08 ENCOUNTER — TELEPHONE (OUTPATIENT)
Facility: CLINIC | Age: 68
End: 2023-05-08

## 2023-05-08 NOTE — TELEPHONE ENCOUNTER
Background: bilteral leg edema after long trip; Tried to message on [employer] but not read. We will follow up on 05/16/2023 and just wanted her to know sooner rather than later those results. Tasks: Please contact patient and let know that her leg ultrasound does not show any signs of a blood clot, and her heart ultrasound does not show signs of heart failure or valve abnormalities.      Thank you.    -Dr. Marguerite Sandra

## 2023-05-19 ENCOUNTER — OFFICE VISIT (OUTPATIENT)
Facility: CLINIC | Age: 68
End: 2023-05-19
Payer: MEDICARE

## 2023-05-19 VITALS
TEMPERATURE: 99 F | DIASTOLIC BLOOD PRESSURE: 83 MMHG | HEIGHT: 65 IN | HEART RATE: 111 BPM | WEIGHT: 154 LBS | BODY MASS INDEX: 25.66 KG/M2 | SYSTOLIC BLOOD PRESSURE: 127 MMHG | OXYGEN SATURATION: 95 %

## 2023-05-19 DIAGNOSIS — G25.0 ESSENTIAL TREMOR: ICD-10-CM

## 2023-05-19 DIAGNOSIS — D75.89 MACROCYTOSIS WITHOUT ANEMIA: ICD-10-CM

## 2023-05-19 DIAGNOSIS — R55 EPISODE OF LOSS OF CONSCIOUSNESS: ICD-10-CM

## 2023-05-19 DIAGNOSIS — R68.89 SUSPECTED SOFT TISSUE INFECTION: ICD-10-CM

## 2023-05-19 DIAGNOSIS — F33.1 MODERATE EPISODE OF RECURRENT MAJOR DEPRESSIVE DISORDER (HCC): ICD-10-CM

## 2023-05-19 DIAGNOSIS — F41.9 ANXIETY: ICD-10-CM

## 2023-05-19 DIAGNOSIS — R55 EPISODE OF LOSS OF CONSCIOUSNESS: Primary | ICD-10-CM

## 2023-05-19 PROCEDURE — 3017F COLORECTAL CA SCREEN DOC REV: CPT | Performed by: FAMILY MEDICINE

## 2023-05-19 PROCEDURE — 3079F DIAST BP 80-89 MM HG: CPT | Performed by: FAMILY MEDICINE

## 2023-05-19 PROCEDURE — G8419 CALC BMI OUT NRM PARAM NOF/U: HCPCS | Performed by: FAMILY MEDICINE

## 2023-05-19 PROCEDURE — 1036F TOBACCO NON-USER: CPT | Performed by: FAMILY MEDICINE

## 2023-05-19 PROCEDURE — 1123F ACP DISCUSS/DSCN MKR DOCD: CPT | Performed by: FAMILY MEDICINE

## 2023-05-19 PROCEDURE — G8428 CUR MEDS NOT DOCUMENT: HCPCS | Performed by: FAMILY MEDICINE

## 2023-05-19 PROCEDURE — G8399 PT W/DXA RESULTS DOCUMENT: HCPCS | Performed by: FAMILY MEDICINE

## 2023-05-19 PROCEDURE — 1090F PRES/ABSN URINE INCON ASSESS: CPT | Performed by: FAMILY MEDICINE

## 2023-05-19 PROCEDURE — 3074F SYST BP LT 130 MM HG: CPT | Performed by: FAMILY MEDICINE

## 2023-05-19 PROCEDURE — 99215 OFFICE O/P EST HI 40 MIN: CPT | Performed by: FAMILY MEDICINE

## 2023-05-19 RX ORDER — AMOXICILLIN 500 MG/1
500 CAPSULE ORAL 3 TIMES DAILY
Qty: 21 CAPSULE | Refills: 0 | Status: SHIPPED | OUTPATIENT
Start: 2023-05-19 | End: 2023-05-26

## 2023-05-19 RX ORDER — PROPRANOLOL HCL 60 MG
60 CAPSULE, EXTENDED RELEASE 24HR ORAL EVERY MORNING
Qty: 90 CAPSULE | Refills: 3 | Status: SHIPPED | OUTPATIENT
Start: 2023-05-19

## 2023-05-19 RX ORDER — DOXYCYCLINE HYCLATE 100 MG
100 TABLET ORAL 2 TIMES DAILY
Qty: 14 TABLET | Refills: 0 | Status: SHIPPED | OUTPATIENT
Start: 2023-05-19 | End: 2023-05-26

## 2023-05-19 RX ORDER — FUROSEMIDE 20 MG/1
TABLET ORAL
COMMUNITY
Start: 2023-04-19

## 2023-05-19 RX ORDER — AMLODIPINE BESYLATE 5 MG/1
TABLET ORAL
COMMUNITY
Start: 2022-05-23

## 2023-05-19 ASSESSMENT — PATIENT HEALTH QUESTIONNAIRE - PHQ9
8. MOVING OR SPEAKING SO SLOWLY THAT OTHER PEOPLE COULD HAVE NOTICED. OR THE OPPOSITE, BEING SO FIGETY OR RESTLESS THAT YOU HAVE BEEN MOVING AROUND A LOT MORE THAN USUAL: 2
SUM OF ALL RESPONSES TO PHQ QUESTIONS 1-9: 17
9. THOUGHTS THAT YOU WOULD BE BETTER OFF DEAD, OR OF HURTING YOURSELF: 0
6. FEELING BAD ABOUT YOURSELF - OR THAT YOU ARE A FAILURE OR HAVE LET YOURSELF OR YOUR FAMILY DOWN: 3
4. FEELING TIRED OR HAVING LITTLE ENERGY: 3
3. TROUBLE FALLING OR STAYING ASLEEP: 3
SUM OF ALL RESPONSES TO PHQ QUESTIONS 1-9: 17
SUM OF ALL RESPONSES TO PHQ9 QUESTIONS 1 & 2: 6
7. TROUBLE CONCENTRATING ON THINGS, SUCH AS READING THE NEWSPAPER OR WATCHING TELEVISION: 0
5. POOR APPETITE OR OVEREATING: 0
SUM OF ALL RESPONSES TO PHQ QUESTIONS 1-9: 17
10. IF YOU CHECKED OFF ANY PROBLEMS, HOW DIFFICULT HAVE THESE PROBLEMS MADE IT FOR YOU TO DO YOUR WORK, TAKE CARE OF THINGS AT HOME, OR GET ALONG WITH OTHER PEOPLE: 1
1. LITTLE INTEREST OR PLEASURE IN DOING THINGS: 3
2. FEELING DOWN, DEPRESSED OR HOPELESS: 3
SUM OF ALL RESPONSES TO PHQ QUESTIONS 1-9: 17

## 2023-05-19 NOTE — ASSESSMENT & PLAN NOTE
Uncontrolled, medication adherence emphasized and do not start until negative CTA head/neck for stroke.
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
## 2023-05-19 NOTE — PATIENT INSTRUCTIONS
Suicide Hotline/Help:  Call or text 8181 898 32 16 for Online and Local Therapists:  [employer]. [facility]    For Psychology/Psychiatry, can call:  [address]  [address]  JWPMLQUFO  [phone]    Tyler Hobbs  [address]   [phone] OR [phone]    [facility] 5   [facility]. [facility] 97  Adela Hobbs, [address] [facility]  PHONE [phone]    [address]:  Crisis Intervention   call anytime  [phone]    Mental Health Support Services  Address  [address]    Phone: [phone]  Fax: [phone]    Hours  Monday 8 a.m. - 5 p.m. Tuesday - Thursday 8 a.m. - 9 p.m. Friday 8 a.m. - 5 p.m.

## 2023-05-19 NOTE — ASSESSMENT & PLAN NOTE
Ddx: conversion disorder secondary to uncontrolled anxiety/depression given off medications; arrhythmia given sudden LOC and recovery without pre or prodromal symptoms (could have been masked by propranolol); stroke (sudden onset, and troubles with word finding since then, though no story of post-dromal symptoms and recovery likely too quick from LOC); alcohol intoxication less likely given only 1 drink. 1. CTA head/neck for evidence of stroke. 2. EKG and hotler for arrythmia  3. Basic lab repeat in setting of infection and AMS, get macrocytosis labs as well given history  4. No driving till figured out; should get extended holter and cardiology referral if all above negative. See one of us back in 2 weeks. NOTE: Avoid restart of propranolol until we have had results of CTA head neck.

## 2023-05-20 LAB
ANION GAP SERPL CALC-SCNC: 7 MMOL/L (ref 5–15)
BUN SERPL-MCNC: 10 MG/DL (ref 6–20)
BUN/CREAT SERPL: 15 (ref 12–20)
CALCIUM SERPL-MCNC: 8.9 MG/DL (ref 8.5–10.1)
CHLORIDE SERPL-SCNC: 103 MMOL/L (ref 97–108)
CO2 SERPL-SCNC: 27 MMOL/L (ref 21–32)
CREAT SERPL-MCNC: 0.68 MG/DL (ref 0.55–1.02)
CRP SERPL-MCNC: <0.29 MG/DL (ref 0–0.6)
ERYTHROCYTE [DISTWIDTH] IN BLOOD BY AUTOMATED COUNT: 13 % (ref 11.5–14.5)
FOLATE SERPL-MCNC: 11.3 NG/ML (ref 5–21)
GLUCOSE SERPL-MCNC: 122 MG/DL (ref 65–100)
HCT VFR BLD AUTO: 42.4 % (ref 35–47)
HGB BLD-MCNC: 14 G/DL (ref 11.5–16)
MCH RBC QN AUTO: 34.1 PG (ref 26–34)
MCHC RBC AUTO-ENTMCNC: 33 G/DL (ref 30–36.5)
MCV RBC AUTO: 103.2 FL (ref 80–99)
NRBC # BLD: 0 K/UL (ref 0–0.01)
NRBC BLD-RTO: 0 PER 100 WBC
PLATELET # BLD AUTO: 182 K/UL (ref 150–400)
PMV BLD AUTO: 10.2 FL (ref 8.9–12.9)
POTASSIUM SERPL-SCNC: 3.6 MMOL/L (ref 3.5–5.1)
RBC # BLD AUTO: 4.11 M/UL (ref 3.8–5.2)
SODIUM SERPL-SCNC: 137 MMOL/L (ref 136–145)
VIT B12 SERPL-MCNC: 160 PG/ML (ref 193–986)
WBC # BLD AUTO: 5.8 K/UL (ref 3.6–11)

## 2023-05-21 LAB — PERIPHERAL SMEAR, MD REVIEW: NORMAL

## 2023-05-22 NOTE — RESULT ENCOUNTER NOTE
Please contact patient and daughter Shorty Rincon ([phone]) and let them know that patient is deficient in Vitamin B12. I have ordered 1000mcg tablets at [facility], she should take one each day. We can recheck blood in 4 weeks to make sure this is getting better.

## 2023-05-23 ENCOUNTER — TELEPHONE (OUTPATIENT)
Facility: CLINIC | Age: 68
End: 2023-05-23

## 2023-05-23 NOTE — TELEPHONE ENCOUNTER
Pt's CTA head neck order needs to be changed/corrected to stated information below. Request Notes    05/22 1008AM Per Jose Carlos Fuentes in Ct order needs to be corrected CTA typically is W not W WO.  Advised pt and provider SScruggs

## 2023-05-26 ENCOUNTER — HOSPITAL ENCOUNTER (OUTPATIENT)
Facility: HOSPITAL | Age: 68
End: 2023-05-26
Attending: FAMILY MEDICINE
Payer: MEDICARE

## 2023-05-26 DIAGNOSIS — R55 EPISODE OF LOSS OF CONSCIOUSNESS: ICD-10-CM

## 2023-05-26 PROCEDURE — 70496 CT ANGIOGRAPHY HEAD: CPT

## 2023-05-26 PROCEDURE — 6360000004 HC RX CONTRAST MEDICATION: Performed by: FAMILY MEDICINE

## 2023-05-26 RX ADMIN — IOPAMIDOL 95 ML: 755 INJECTION, SOLUTION INTRAVENOUS at 15:54

## 2023-05-31 DIAGNOSIS — M81.0 OSTEOPOROSIS, UNSPECIFIED OSTEOPOROSIS TYPE, UNSPECIFIED PATHOLOGICAL FRACTURE PRESENCE: Primary | ICD-10-CM

## 2023-05-31 DIAGNOSIS — Z79.899 OTHER LONG TERM (CURRENT) DRUG THERAPY: ICD-10-CM

## 2023-05-31 RX ORDER — ALBUTEROL SULFATE 90 UG/1
4 AEROSOL, METERED RESPIRATORY (INHALATION) PRN
OUTPATIENT
Start: 2023-06-07

## 2023-05-31 RX ORDER — ACETAMINOPHEN 325 MG/1
650 TABLET ORAL
OUTPATIENT
Start: 2023-06-07

## 2023-05-31 RX ORDER — FAMOTIDINE 10 MG/ML
20 INJECTION, SOLUTION INTRAVENOUS
OUTPATIENT
Start: 2023-06-07

## 2023-05-31 RX ORDER — DIPHENHYDRAMINE HYDROCHLORIDE 50 MG/ML
50 INJECTION INTRAMUSCULAR; INTRAVENOUS
OUTPATIENT
Start: 2023-06-07

## 2023-05-31 RX ORDER — SODIUM CHLORIDE 9 MG/ML
INJECTION, SOLUTION INTRAVENOUS CONTINUOUS
OUTPATIENT
Start: 2023-06-07

## 2023-05-31 RX ORDER — ONDANSETRON 2 MG/ML
8 INJECTION INTRAMUSCULAR; INTRAVENOUS
OUTPATIENT
Start: 2023-06-07

## 2023-05-31 RX ORDER — EPINEPHRINE 1 MG/ML
0.3 INJECTION, SOLUTION, CONCENTRATE INTRAVENOUS PRN
OUTPATIENT
Start: 2023-06-07

## 2023-06-02 ENCOUNTER — HOSPITAL ENCOUNTER (OUTPATIENT)
Facility: HOSPITAL | Age: 68
End: 2023-06-02
Attending: FAMILY MEDICINE
Payer: MEDICARE

## 2023-06-02 DIAGNOSIS — R55 EPISODE OF LOSS OF CONSCIOUSNESS: ICD-10-CM

## 2023-06-02 PROCEDURE — 93225 XTRNL ECG REC<48 HRS REC: CPT

## 2023-06-02 PROCEDURE — 93227 XTRNL ECG REC<48 HR R&I: CPT | Performed by: SPECIALIST

## 2023-06-03 LAB
EKG ATRIAL RATE: 79 BPM
EKG DIAGNOSIS: NORMAL
EKG P AXIS: 21 DEGREES
EKG P-R INTERVAL: 160 MS
EKG Q-T INTERVAL: 392 MS
EKG QRS DURATION: 76 MS
EKG QTC CALCULATION (BAZETT): 449 MS
EKG R AXIS: -37 DEGREES
EKG T AXIS: 5 DEGREES
EKG VENTRICULAR RATE: 79 BPM

## 2023-06-06 ENCOUNTER — PATIENT MESSAGE (OUTPATIENT)
Facility: CLINIC | Age: 68
End: 2023-06-06

## 2023-06-07 ENCOUNTER — OFFICE VISIT (OUTPATIENT)
Age: 68
End: 2023-06-07
Payer: MEDICARE

## 2023-06-07 ENCOUNTER — APPOINTMENT (OUTPATIENT)
Dept: INFUSION THERAPY | Age: 68
End: 2023-06-07

## 2023-06-07 DIAGNOSIS — F33.1 MODERATE EPISODE OF RECURRENT MAJOR DEPRESSIVE DISORDER (HCC): ICD-10-CM

## 2023-06-07 DIAGNOSIS — F10.90 ALCOHOL USE DISORDER: ICD-10-CM

## 2023-06-07 DIAGNOSIS — F41.8 ANXIETY ABOUT HEALTH: ICD-10-CM

## 2023-06-07 DIAGNOSIS — R55 EPISODE OF LOSS OF CONSCIOUSNESS: ICD-10-CM

## 2023-06-07 DIAGNOSIS — R90.89 ABNORMAL BRAIN MRI: ICD-10-CM

## 2023-06-07 DIAGNOSIS — R41.3 SHORT-TERM MEMORY LOSS: ICD-10-CM

## 2023-06-07 DIAGNOSIS — G31.84 MILD COGNITIVE IMPAIRMENT: Primary | ICD-10-CM

## 2023-06-07 PROCEDURE — 1036F TOBACCO NON-USER: CPT | Performed by: CLINICAL NEUROPSYCHOLOGIST

## 2023-06-07 PROCEDURE — 1123F ACP DISCUSS/DSCN MKR DOCD: CPT | Performed by: CLINICAL NEUROPSYCHOLOGIST

## 2023-06-07 PROCEDURE — 90791 PSYCH DIAGNOSTIC EVALUATION: CPT | Performed by: CLINICAL NEUROPSYCHOLOGIST

## 2023-06-07 NOTE — PROGRESS NOTES
Thought Process: within normal limits   Expressive Language: within normal limits   Receptive Language: within normal limits   Motor:  No cognitive or motor perseveration  ETOH: Not a big drinker, but drinks alcohol occasionally, she was a heavy drinker until about a year ago.     Tobacco: Quit 3 months ago  MJ: Denied  Illicit: Denied  SI/HI: Denied  Psychosis: Denied  Insight: Within normal limits  Judgment: Within normal limits  Other Psych:      Past Medical History:   Diagnosis Date    Allergic conjunctivitis of both eyes 10/19/2015    Brain tumor ([address]     Depression 09/11/2014    Essential hypertension 12/15/2017    Hot flashes 06/23/2014    Hot flashes     Hypercholesterolemia     Osteoarthritis of right knee 03/11/2014    Osteoarthritis of right knee 03/11/2014    Pap smear for cervical cancer screening 03/01/2023    wnl       Past Surgical History:   Procedure Laterality Date    COLONOSCOPY      2 /10 normal    IR KYPHOPLASTY LUMBAR FIRST LEVEL  10/25/2022    IR KYPHOPLASTY LUMBAR FIRST LEVEL 10/25/2022 [facility] IR    IR KYPHOPLASTY LUMBAR FIRST LEVEL  10/25/2022       Allergies   Allergen Reactions    Venlafaxine Other (See Comments)     jittery       Family History   Problem Relation Age of Onset    Elevated Lipids Sister     Elevated Lipids Father     Elevated Lipids Mother     Hypertension Father        Social History     Tobacco Use    Smoking status: Former    Smokeless tobacco: Never   Vaping Use    Vaping Use: Never used   Substance Use Topics    Alcohol use: Yes    Drug use: No       Current Outpatient Medications   Medication Sig Dispense Refill    cyanocobalamin ([facility] VITAMIN B12) 1000 MCG tablet Take 1 tablet by mouth daily 90 tablet 0    amLODIPine (NORVASC) 5 MG tablet       furosemide (LASIX) 20 MG tablet TAKE ONE TABLET BY MOUTH TWICE DAILY FOR 30 DAYS      propranolol (INDERAL LA) 60 MG extended release capsule Take 1 capsule by mouth every morning 90 capsule 3    busPIRone (BUSPAR) 5

## 2023-06-14 ENCOUNTER — TELEPHONE (OUTPATIENT)
Age: 68
End: 2023-06-14

## 2023-06-15 ENCOUNTER — PROCEDURE VISIT (OUTPATIENT)
Age: 68
End: 2023-06-15
Payer: MEDICARE

## 2023-06-15 DIAGNOSIS — F10.90 ALCOHOL USE DISORDER: ICD-10-CM

## 2023-06-15 DIAGNOSIS — F32.1 MODERATE MAJOR DEPRESSION (HCC): ICD-10-CM

## 2023-06-15 DIAGNOSIS — G31.84 MILD COGNITIVE IMPAIRMENT: Primary | ICD-10-CM

## 2023-06-15 DIAGNOSIS — F41.9 SEVERE ANXIETY: ICD-10-CM

## 2023-06-15 PROCEDURE — 96139 PSYCL/NRPSYC TST TECH EA: CPT | Performed by: CLINICAL NEUROPSYCHOLOGIST

## 2023-06-15 PROCEDURE — 96137 PSYCL/NRPSYC TST PHY/QHP EA: CPT | Performed by: CLINICAL NEUROPSYCHOLOGIST

## 2023-06-15 PROCEDURE — 96133 NRPSYC TST EVAL PHYS/QHP EA: CPT | Performed by: CLINICAL NEUROPSYCHOLOGIST

## 2023-06-15 PROCEDURE — 96136 PSYCL/NRPSYC TST PHY/QHP 1ST: CPT | Performed by: CLINICAL NEUROPSYCHOLOGIST

## 2023-06-15 PROCEDURE — 96138 PSYCL/NRPSYC TECH 1ST: CPT | Performed by: CLINICAL NEUROPSYCHOLOGIST

## 2023-06-15 PROCEDURE — 96132 NRPSYC TST EVAL PHYS/QHP 1ST: CPT | Performed by: CLINICAL NEUROPSYCHOLOGIST

## 2023-06-16 ENCOUNTER — TELEPHONE (OUTPATIENT)
Age: 68
End: 2023-06-16

## 2023-06-20 RX ORDER — GABAPENTIN 300 MG/1
CAPSULE ORAL
Qty: 60 CAPSULE | Refills: 3 | OUTPATIENT
Start: 2023-06-20

## 2023-06-20 NOTE — PROGRESS NOTES
[address] [facility],[address]  [address]   [place] [address] [facility]   [phone] Office   [phone] Fax      Neuropsychological Evaluation Report    Referral:  Immanuel Yo MD    Albino Madsen is a 78 y.o. left handed   female who was accompanied by her daughter to the initial clinical interview on 6/7/23. Please refer to her medical records for details pertaining to her history. At the start of the appointment, I reviewed the patient's [facility] Epic Chart (including Media scanned in from previous providers) for the active Problem List, all pertinent Past Medical Hx, medications, recent radiologic and laboratory findings. In addition, I reviewed pt's documented Immunization Record and Encounter History. The patient  has a past medical history of Allergic conjunctivitis of both eyes, Brain tumor ([address] Depression, Essential hypertension, Hot flashes, Hot flashes, Hypercholesterolemia, Osteoarthritis of right knee, Osteoarthritis of right knee, and Pap smear for cervical cancer screening. She  has a past surgical history that includes IR KYPHOPLASTY LUMBAR 1 VERTEBRAL BODY (10/25/2022); Colonoscopy; and IR KYPHOPLASTY LUMBAR 1 VERTEBRAL BODY (10/25/2022). She has noticed some physical and cognitive changes worsening over the past 8 months or so. Balance issues with a tendency to fall to the left, numbness of the Ues, and cognitive decline. She has a 1.8 x 1.5 x 2.0 cm avidly enhancing mass in the region of the pineal gland. Mild mass effect on the superior tectum, without parenchymal edema. Also has small 6 mm meningioma at the left superior frontal convexity. Is in consultation with Dr. Mirella Tolbert regarding gamma knife. She completed [employer] in [facility] and she did have repeat courses when in college \"because she was lazy. \"  She is retired.    family and did jobs what she

## 2023-06-21 ENCOUNTER — OFFICE VISIT (OUTPATIENT)
Facility: CLINIC | Age: 68
End: 2023-06-21

## 2023-06-21 ENCOUNTER — CLINICAL DOCUMENTATION (OUTPATIENT)
Age: 68
End: 2023-06-21

## 2023-06-21 VITALS
TEMPERATURE: 97.2 F | WEIGHT: 158 LBS | RESPIRATION RATE: 20 BRPM | BODY MASS INDEX: 26.33 KG/M2 | SYSTOLIC BLOOD PRESSURE: 111 MMHG | HEIGHT: 65 IN | DIASTOLIC BLOOD PRESSURE: 80 MMHG | OXYGEN SATURATION: 96 % | HEART RATE: 75 BPM

## 2023-06-21 DIAGNOSIS — G31.84 MILD COGNITIVE IMPAIRMENT: ICD-10-CM

## 2023-06-21 DIAGNOSIS — R55 SYNCOPE, UNSPECIFIED SYNCOPE TYPE: ICD-10-CM

## 2023-06-21 DIAGNOSIS — R94.31 ABNORMAL EKG: Primary | ICD-10-CM

## 2023-06-21 DIAGNOSIS — G25.0 ESSENTIAL TREMOR: ICD-10-CM

## 2023-06-21 DIAGNOSIS — F41.9 ANXIETY: ICD-10-CM

## 2023-06-21 DIAGNOSIS — R60.0 BILATERAL LOWER EXTREMITY EDEMA: ICD-10-CM

## 2023-06-21 RX ORDER — FUROSEMIDE 20 MG/1
TABLET ORAL
Qty: 60 TABLET | Refills: 0 | Status: SHIPPED | OUTPATIENT
Start: 2023-06-21

## 2023-06-21 RX ORDER — PROPRANOLOL HCL 60 MG
60 CAPSULE, EXTENDED RELEASE 24HR ORAL EVERY MORNING
Qty: 90 CAPSULE | Refills: 3 | Status: SHIPPED | OUTPATIENT
Start: 2023-06-21

## 2023-06-21 ASSESSMENT — ENCOUNTER SYMPTOMS: SHORTNESS OF BREATH: 0

## 2023-07-03 ENCOUNTER — HOSPITAL ENCOUNTER (OUTPATIENT)
Facility: HOSPITAL | Age: 68
Discharge: HOME | End: 2023-07-06
Payer: MEDICARE

## 2023-07-03 ENCOUNTER — PROCEDURE VISIT (OUTPATIENT)
Age: 68
End: 2023-07-03

## 2023-07-03 DIAGNOSIS — G47.33 OBSTRUCTIVE SLEEP APNEA (ADULT) (PEDIATRIC): Primary | ICD-10-CM

## 2023-07-03 PROCEDURE — G0399 HOME SLEEP TEST/TYPE 3 PORTA: HCPCS | Performed by: INTERNAL MEDICINE

## 2023-07-03 NOTE — PROGRESS NOTES
[facility].[place], [address]  Tel.  [phone]  Fax. [address] [facility], [address] [place] [address]  Tel.  [phone]  Fax. [phone] [facility], [address] [facility]  Tel.  [phone]  Fax. [phone]       Bonnie Balbuena is a 78 y.o. female seen today to receive a home sleep apnea testing (HSAT) device. Patient was educated on proper hookup and operation of the HSAT device. The HSAT Agreement was reviewed and endorsed by patient. Instructional forms were reviewed. Patient demonstrated good understanding of the HSAT device. There were no vitals taken for this visit. No diagnosis found. General information regarding operations and maintenance of the HSAT device was provided. She  was asked to contact our office for any problems regarding her home sleep apnea test.  Patient will return the home sleep testing unit by 9AM unless otherwise approved. Patient will be contacted once the results have been reviewed by the physician, typically within 10-15 business days.   HSAT [id number]  Please call with results

## 2023-07-04 DIAGNOSIS — M54.50 LOW BACK PAIN, UNSPECIFIED BACK PAIN LATERALITY, UNSPECIFIED CHRONICITY, UNSPECIFIED WHETHER SCIATICA PRESENT: Primary | ICD-10-CM

## 2023-07-04 RX ORDER — GABAPENTIN 300 MG/1
300 CAPSULE ORAL 2 TIMES DAILY
Qty: 180 CAPSULE | Refills: 0 | Status: SHIPPED | OUTPATIENT
Start: 2023-07-04 | End: 2023-10-02

## 2023-07-05 ENCOUNTER — TELEPHONE (OUTPATIENT)
Age: 68
End: 2023-07-05

## 2023-07-05 ENCOUNTER — CLINICAL DOCUMENTATION (OUTPATIENT)
Age: 68
End: 2023-07-05

## 2023-07-05 ENCOUNTER — PROCEDURE VISIT (OUTPATIENT)
Age: 68
End: 2023-07-05

## 2023-07-05 DIAGNOSIS — G47.33 OBSTRUCTIVE SLEEP APNEA (ADULT) (PEDIATRIC): Primary | ICD-10-CM

## 2023-07-05 NOTE — TELEPHONE ENCOUNTER
Results of sleep study in R-Clinical Data  Lead tech to convey results to patient  HSAT results in R-Clinical Data. Test positive for signfiicant sleep apnea. AHI 15/hour and lowest oxygen saturation was 69%. We had discussed treatment options at initial consultation. Based on the results of the home sleep apnea test, I believe a trial of APAP would be an effective mode of therapy. APAP order attached. she should be seen in the sleep disorder center 4-6 weeks after initiating PAP therapy. Patient should call the office the day she gets set up with new PAP device so we can schedule her for an adherence/compliance visit within 31-90 days of obtaining a new device. Front staff to Order PAP and call patient and let them know which DME company they should be hearing from after results reviewed with lead support technologist.     she will need a first adherence visit.

## 2023-07-06 ENCOUNTER — CLINICAL DOCUMENTATION (OUTPATIENT)
Age: 68
End: 2023-07-06

## 2023-07-06 NOTE — PROGRESS NOTES
PAP & supply order faxed to [facility], scanned into media and letter mailed to patient to advise.

## 2023-07-06 NOTE — TELEPHONE ENCOUNTER
Reviewed sleep study results with patient. She expressed understanding and is willing to proceed with a trial of APAP. Front staff to forward DME order. Thank you.

## 2023-07-10 ENCOUNTER — TELEPHONE (OUTPATIENT)
Facility: CLINIC | Age: 68
End: 2023-07-10

## 2023-07-10 NOTE — TELEPHONE ENCOUNTER
Pt called regarding referral to [facility], stating although it's a [employer], she was advised by Marzena to have her records faxed to office at [phone] before she can schedule appointment. Confirmed this is a [facility] practice and should be able to see pt's records in [place]. Called office 7/10/23 and left message to contact our office if unable to view pt's records or if information needs to be verified.  Stacy

## 2023-07-12 NOTE — PROGRESS NOTES
Subjective:   Tarsha Horne is a 78 y.o.  female with a past medical history including hypertension, hypercholesterolemia, KATJA, osteopenia, depression, pineal tumor, and anxiety presents the clinic today as a referral from her primary care physician to be further worked up for an abnormal EKG. It seems that Ms. Tricia Strange had an episode of syncope in June of this year. She states that she was driving back from dropping off a friend (after having 2 glasses of wine) and she recalls pulling over into a parking lot \"I am not really sure why I did it, but the last thing I remember is going into the parking lot and then I just woke up in my car\". Despite having the knowledge to pull off the road, she cannot recall feeling poorly. She specifically denies having any chest pain, palpitations, shortness of breath, lightheadedness/dizziness, or diaphoresis. She was promptly evaluated by her primary care physician who completed a 48-hour Holter monitor (occasional ectopy, no other arrhythmias), echocardiogram (no significant structural or functional heart disease), and an EKG that shows anterolateral T wave inversions with 1 mm of ST depression. Her cardiac ROS is fairly unremarkable. She does walk about 20 minutes a day and states that she occasionally feels a little bit short of breath with exertion. She denies any exertional chest pain. For several months now she has also been awaking with a sensation of mild chest discomfort (described as achy). She states that the symptoms generally go away almost immediately when she gets up and starts her day and moves around some. She also endorses several months of bilateral lower extremity swelling. She denies any changes to her lifestyle or medication regimen around the time that the symptoms started. She has been walking a little bit more, restricting salt intake, and elevating her feet at night which do seem to help the symptoms some.   She has not

## 2023-07-13 ENCOUNTER — OFFICE VISIT (OUTPATIENT)
Age: 68
End: 2023-07-13
Payer: MEDICARE

## 2023-07-13 VITALS
DIASTOLIC BLOOD PRESSURE: 64 MMHG | BODY MASS INDEX: 26.66 KG/M2 | SYSTOLIC BLOOD PRESSURE: 100 MMHG | HEIGHT: 65 IN | HEART RATE: 68 BPM | OXYGEN SATURATION: 94 % | WEIGHT: 160 LBS

## 2023-07-13 DIAGNOSIS — R55 SYNCOPE AND COLLAPSE: ICD-10-CM

## 2023-07-13 DIAGNOSIS — G47.33 OBSTRUCTIVE SLEEP APNEA SYNDROME: ICD-10-CM

## 2023-07-13 DIAGNOSIS — E78.00 HYPERCHOLESTEROLEMIA: ICD-10-CM

## 2023-07-13 DIAGNOSIS — D49.7: ICD-10-CM

## 2023-07-13 DIAGNOSIS — I10 PRIMARY HYPERTENSION: ICD-10-CM

## 2023-07-13 DIAGNOSIS — R60.9 EDEMA, UNSPECIFIED TYPE: ICD-10-CM

## 2023-07-13 DIAGNOSIS — R60.9 PERIPHERAL EDEMA: ICD-10-CM

## 2023-07-13 DIAGNOSIS — R94.31 ABNORMAL EKG: Primary | ICD-10-CM

## 2023-07-13 DIAGNOSIS — R06.09 DYSPNEA ON EXERTION: ICD-10-CM

## 2023-07-13 DIAGNOSIS — R23.3 EASY BRUISING: Primary | ICD-10-CM

## 2023-07-13 PROCEDURE — G8399 PT W/DXA RESULTS DOCUMENT: HCPCS | Performed by: NURSE PRACTITIONER

## 2023-07-13 PROCEDURE — G8419 CALC BMI OUT NRM PARAM NOF/U: HCPCS | Performed by: NURSE PRACTITIONER

## 2023-07-13 PROCEDURE — 3078F DIAST BP <80 MM HG: CPT | Performed by: NURSE PRACTITIONER

## 2023-07-13 PROCEDURE — 1090F PRES/ABSN URINE INCON ASSESS: CPT | Performed by: NURSE PRACTITIONER

## 2023-07-13 PROCEDURE — 99204 OFFICE O/P NEW MOD 45 MIN: CPT | Performed by: NURSE PRACTITIONER

## 2023-07-13 PROCEDURE — 1123F ACP DISCUSS/DSCN MKR DOCD: CPT | Performed by: NURSE PRACTITIONER

## 2023-07-13 PROCEDURE — 3017F COLORECTAL CA SCREEN DOC REV: CPT | Performed by: NURSE PRACTITIONER

## 2023-07-13 PROCEDURE — 1036F TOBACCO NON-USER: CPT | Performed by: NURSE PRACTITIONER

## 2023-07-13 PROCEDURE — 3074F SYST BP LT 130 MM HG: CPT | Performed by: NURSE PRACTITIONER

## 2023-07-13 PROCEDURE — G8427 DOCREV CUR MEDS BY ELIG CLIN: HCPCS | Performed by: NURSE PRACTITIONER

## 2023-07-13 RX ORDER — CHLORTHALIDONE 25 MG/1
12.5 TABLET ORAL DAILY
Qty: 90 TABLET | Refills: 1 | Status: SHIPPED | OUTPATIENT
Start: 2023-07-13

## 2023-07-13 ASSESSMENT — PATIENT HEALTH QUESTIONNAIRE - PHQ9
SUM OF ALL RESPONSES TO PHQ QUESTIONS 1-9: 0
2. FEELING DOWN, DEPRESSED OR HOPELESS: 0
SUM OF ALL RESPONSES TO PHQ9 QUESTIONS 1 & 2: 0
SUM OF ALL RESPONSES TO PHQ QUESTIONS 1-9: 0
1. LITTLE INTEREST OR PLEASURE IN DOING THINGS: 0
SUM OF ALL RESPONSES TO PHQ QUESTIONS 1-9: 0
SUM OF ALL RESPONSES TO PHQ QUESTIONS 1-9: 0

## 2023-07-13 NOTE — PATIENT INSTRUCTIONS
We are completing a stress test to see if you have blockages in your heart vessels based on your abnormal EKG    Your recent echocardiogram and heart monitor looked normal.  We are going to get a longer heart monitor to see if you have any rhythms that could cause you to pass out. Your leg swelling could be coming from an issue with your veins. Continue to walk 20 to 25 minutes daily, elevate your legs, limit salt intake, and try compression stockings to see if this helps with the swelling. I also recommend that you stop taking amlodipine for blood pressure and start taking 1/2 pill (12.5 mg) of chlorthalidone daily to see if this helps the swelling to resolve.

## 2023-07-15 LAB
BASOPHILS # BLD AUTO: 0 X10E3/UL (ref 0–0.2)
BASOPHILS NFR BLD AUTO: 1 %
BNP SERPL-MCNC: 155.6 PG/ML (ref 0–100)
EOSINOPHIL # BLD AUTO: 0.2 X10E3/UL (ref 0–0.4)
EOSINOPHIL NFR BLD AUTO: 5 %
ERYTHROCYTE [DISTWIDTH] IN BLOOD BY AUTOMATED COUNT: 12.7 % (ref 11.7–15.4)
HCT VFR BLD AUTO: 39.2 % (ref 34–46.6)
HGB BLD-MCNC: 13 G/DL (ref 11.1–15.9)
IMM GRANULOCYTES # BLD AUTO: 0 X10E3/UL (ref 0–0.1)
IMM GRANULOCYTES NFR BLD AUTO: 0 %
LYMPHOCYTES # BLD AUTO: 1.8 X10E3/UL (ref 0.7–3.1)
LYMPHOCYTES NFR BLD AUTO: 38 %
MCH RBC QN AUTO: 32.7 PG (ref 26.6–33)
MCHC RBC AUTO-ENTMCNC: 33.2 G/DL (ref 31.5–35.7)
MCV RBC AUTO: 99 FL (ref 79–97)
MONOCYTES # BLD AUTO: 0.6 X10E3/UL (ref 0.1–0.9)
MONOCYTES NFR BLD AUTO: 12 %
NEUTROPHILS # BLD AUTO: 2.2 X10E3/UL (ref 1.4–7)
NEUTROPHILS NFR BLD AUTO: 44 %
PLATELET # BLD AUTO: 226 X10E3/UL (ref 150–450)
RBC # BLD AUTO: 3.97 X10E6/UL (ref 3.77–5.28)
WBC # BLD AUTO: 4.9 X10E3/UL (ref 3.4–10.8)

## 2023-07-24 RX ORDER — PRAVASTATIN SODIUM 40 MG
40 TABLET ORAL NIGHTLY
Qty: 90 TABLET | Refills: 3 | Status: SHIPPED | OUTPATIENT
Start: 2023-07-24

## 2023-07-28 ENCOUNTER — OFFICE VISIT (OUTPATIENT)
Facility: CLINIC | Age: 68
End: 2023-07-28

## 2023-07-28 VITALS
TEMPERATURE: 97.4 F | DIASTOLIC BLOOD PRESSURE: 64 MMHG | WEIGHT: 160 LBS | HEIGHT: 65 IN | BODY MASS INDEX: 26.66 KG/M2 | SYSTOLIC BLOOD PRESSURE: 110 MMHG | HEART RATE: 61 BPM | OXYGEN SATURATION: 94 % | RESPIRATION RATE: 16 BRPM

## 2023-07-28 DIAGNOSIS — R55 EPISODE OF LOSS OF CONSCIOUSNESS: ICD-10-CM

## 2023-07-28 DIAGNOSIS — Z99.89 OSA ON CPAP: ICD-10-CM

## 2023-07-28 DIAGNOSIS — I10 ESSENTIAL HYPERTENSION: ICD-10-CM

## 2023-07-28 DIAGNOSIS — G31.84 MCI (MILD COGNITIVE IMPAIRMENT): ICD-10-CM

## 2023-07-28 DIAGNOSIS — F41.9 ANXIETY: Primary | ICD-10-CM

## 2023-07-28 DIAGNOSIS — R60.0 BILATERAL LOWER EXTREMITY EDEMA: ICD-10-CM

## 2023-07-28 DIAGNOSIS — G47.33 OSA ON CPAP: ICD-10-CM

## 2023-07-28 RX ORDER — BUSPIRONE HYDROCHLORIDE 10 MG/1
10 TABLET ORAL 2 TIMES DAILY
Qty: 180 TABLET | Refills: 0 | Status: SHIPPED | OUTPATIENT
Start: 2023-07-28

## 2023-07-28 NOTE — PROGRESS NOTES
ASSESSMENT/PLAN:  Ms. Karine Curry is a 78 y.o. female established patient who presents for Follow-up (Fu for tremors which are doing better feels 90% nml /)  , found to have the followin. Anxiety  Assessment & Plan:   Borderline controlled, changes made today: increase Buspar to 10mg BID  2. KATJA on CPAP  Assessment & Plan:  monitored by specialist. No  Acute problems that necessitate changes to plan. 3. Essential hypertension  Assessment & Plan:  Leg swelling resolved with stopping amlodipine, and starting chlorthalidone with cardiology. However, BP levels still on low normal side, discussed trialing off chlorthalidone, monitoring BP with goal 120/80, and monitoring for return of leg swelling. 4. Episode of loss of consciousness  Assessment & Plan:   Asymptomatic, continue current plan pending work up below  5. MCI (mild cognitive impairment)  Assessment & Plan:   Unclear control, changes made today: work on diet, exercise, avoiding alcohol. optimizing medications, mood, and sleep. 6. Bilateral lower extremity edema  Assessment & Plan:   Well-controlled, changes made today: resolved. BP a bit low, can consider trialing of chlorthalidone to see if returns. also compression and elevation. It was a pleasure seeing Ms. Karine Curry today. Return in about 6 weeks (around 2023) for mood management. SUBJECTIVE:     HPI  Ms. Karine Curry is a 78 y.o. female established patient who presents for the following concerns:    1. Anxiety  Overview:  Medication: buspar 5mg BID, propranolol  Prev meds: SSRI, came off due to ?low bp? Interval HX: doing well back on Buspar 5mg BID, but thinks it could be more effective. Denies any side effects. Denies SI/HI. 2. KATJA on CPAP  Overview:  Recently diagnosed in mid . Interval Hx: just received CPAP in the mail but hasn't set up yet, will do so tonight.     3. Essential hypertension  Overview:  BP Readings from Last 3

## 2023-07-28 NOTE — PROGRESS NOTES
Chief Complaint   Patient presents with    Follow-up     Fu for tremors which are doing better feels 90% nml

## 2023-07-28 NOTE — ASSESSMENT & PLAN NOTE
Well-controlled, changes made today: resolved. BP a bit low, can consider trialing of chlorthalidone to see if returns. also compression and elevation.

## 2023-07-28 NOTE — ASSESSMENT & PLAN NOTE
Leg swelling resolved with stopping amlodipine, and starting chlorthalidone with cardiology. However, BP levels still on low normal side, discussed trialing off chlorthalidone, monitoring BP with goal 120/80, and monitoring for return of leg swelling.

## 2023-07-28 NOTE — ASSESSMENT & PLAN NOTE
Unclear control, changes made today: work on diet, exercise, avoiding alcohol. optimizing medications, mood, and sleep.

## 2023-08-22 ENCOUNTER — HOSPITAL ENCOUNTER (OUTPATIENT)
Facility: HOSPITAL | Age: 68
Discharge: HOME | End: 2023-08-25
Payer: MEDICARE

## 2023-08-22 ENCOUNTER — HOSPITAL ENCOUNTER (OUTPATIENT)
Facility: HOSPITAL | Age: 68
Discharge: HOME | End: 2023-08-24
Payer: MEDICARE

## 2023-08-22 VITALS
DIASTOLIC BLOOD PRESSURE: 105 MMHG | HEART RATE: 60 BPM | SYSTOLIC BLOOD PRESSURE: 137 MMHG | HEIGHT: 65 IN | BODY MASS INDEX: 25.83 KG/M2 | WEIGHT: 155 LBS

## 2023-08-22 DIAGNOSIS — R94.31 ABNORMAL EKG: ICD-10-CM

## 2023-08-22 PROCEDURE — 78452 HT MUSCLE IMAGE SPECT MULT: CPT

## 2023-08-22 PROCEDURE — 3430000000 HC RX DIAGNOSTIC RADIOPHARMACEUTICAL: Performed by: NURSE PRACTITIONER

## 2023-08-22 PROCEDURE — A9500 TC99M SESTAMIBI: HCPCS | Performed by: NURSE PRACTITIONER

## 2023-08-22 RX ORDER — TETRAKIS(2-METHOXYISOBUTYLISOCYANIDE)COPPER(I) TETRAFLUOROBORATE 1 MG/ML
30 INJECTION, POWDER, LYOPHILIZED, FOR SOLUTION INTRAVENOUS ONCE
Status: COMPLETED | OUTPATIENT
Start: 2023-08-22 | End: 2023-08-22

## 2023-08-22 RX ORDER — TETRAKIS(2-METHOXYISOBUTYLISOCYANIDE)COPPER(I) TETRAFLUOROBORATE 1 MG/ML
10 INJECTION, POWDER, LYOPHILIZED, FOR SOLUTION INTRAVENOUS ONCE
Status: COMPLETED | OUTPATIENT
Start: 2023-08-22 | End: 2023-08-22

## 2023-08-22 RX ADMIN — TECHNETIUM TC-99M SESTAMIBI 30 MILLICURIE: 1 INJECTION INTRAVENOUS at 10:00

## 2023-08-22 RX ADMIN — TECHNETIUM TC-99M SESTAMIBI 10 MILLICURIE: 1 INJECTION INTRAVENOUS at 07:20

## 2023-08-23 LAB
ECHO BSA: 1.8 M2
STRESS BASELINE DIAS BP: 105 MMHG
STRESS BASELINE HR: 60 BPM
STRESS BASELINE SYS BP: 137 MMHG
STRESS ESTIMATED WORKLOAD: 4.8 METS
STRESS EXERCISE DUR MIN: 3 MIN
STRESS EXERCISE DUR SEC: 14 SEC
STRESS O2 SAT PEAK: 96 %
STRESS O2 SAT REST: 98 %
STRESS PEAK DIAS BP: 107 MMHG
STRESS PEAK SYS BP: 179 MMHG
STRESS PERCENT HR ACHIEVED: 81 %
STRESS POST PEAK HR: 125 BPM
STRESS RATE PRESSURE PRODUCT: NORMAL BPM*MMHG
STRESS TARGET HR: 154 BPM

## 2023-08-24 ENCOUNTER — TELEPHONE (OUTPATIENT)
Age: 68
End: 2023-08-24

## 2023-08-24 RX ORDER — ASPIRIN 81 MG/1
81 TABLET ORAL DAILY
Qty: 90 TABLET | Refills: 1 | Status: SHIPPED | OUTPATIENT
Start: 2023-08-24

## 2023-08-24 RX ORDER — NITROGLYCERIN 0.4 MG/1
0.4 TABLET SUBLINGUAL EVERY 5 MIN PRN
Qty: 25 TABLET | Refills: 3 | Status: SHIPPED | OUTPATIENT
Start: 2023-08-24

## 2023-08-24 NOTE — TELEPHONE ENCOUNTER
I called and spoke with Ms. Ajay Rivera for a while over the phone regarding her abnormal nuclear stress test.    Given some of her risk factors for CAD, ongoing chest pains and dyspnea, abnormal EKG, and findings of reversible ischemia on the nuclear stress test that was completed, I strongly recommended left heart catheterization to evaluate for obstructive coronary disease. I advised her to start daily aspirin right away and use nitroglycerin as needed with instructions regarding its proper use. I will be seeing her next week in the office to discuss this further, but will initiate the process for getting her scheduled for [facility].

## 2023-08-27 NOTE — PROGRESS NOTES
sinus rhythm with anterolateral T wave inversions. Unable to view prior EKG on 7/21 (unable to open image in EMR) . -Recent 48-hour Holter remarkable for some PACs and PVCs. No other arrhythmias.  -Stress test indicative of anterior ischemia.  -Findings have been discussed with interventional team at [facility] and after some discussion with the patient she would like to proceed with C due to concerns that some of her chest pains and dyspnea could be due to obstructive CAD. -Started on 81 mg of aspirin daily.  -Switched from pravastatin to 40 mg of atorvastatin. -Given nitroglycerin as needed with extensive education on indications for use and appropriate steps to take if she does end up using it. 2. Primary hypertension  -Currently well controlled on propanolol monotherapy.  -Leg swelling resolved with omission of amlodipine. 3. Hypercholesterolemia LDL goal <100  -LDL 90 in April 2022.    -Switch from pravastatin to high intensity atorvastatin today due to concerns of atherosclerotic CAD. -Will require preop lab testing for [facility] which will include a lipid panel. 4. Obstructive sleep apnea syndrome  -Very recently diagnosed with pretty significant sleep apnea (SPO2 reaching as low as 69%), which definitely could be contributing to headaches, fatigue, and even some degree of confusion.  -Awaiting callback from sleep medicine to start PAP. 5. Syncope and collapse  -Only 1 episode, which occurred in June 2023. While she cannot recall feeling any prodromal symptoms, something made her pull off of the road into a parking lot before she syncopized. There is no reported chest pain, shortness of breath, palpitations, dizziness, or diaphoresis prior to the onset of the syncopal episode.  -30-day event monitor will be placed after today's visit. -Recent TTE unremarkable.   -Advised to go to the ED immediately if she experiences any significant palpitations, presyncope, or syncope especially with

## 2023-08-28 ENCOUNTER — TELEPHONE (OUTPATIENT)
Age: 68
End: 2023-08-28

## 2023-08-28 ENCOUNTER — OFFICE VISIT (OUTPATIENT)
Age: 68
End: 2023-08-28
Payer: MEDICARE

## 2023-08-28 ENCOUNTER — HOSPITAL ENCOUNTER (OUTPATIENT)
Facility: HOSPITAL | Age: 68
Discharge: HOME | End: 2023-08-30

## 2023-08-28 VITALS
WEIGHT: 162 LBS | DIASTOLIC BLOOD PRESSURE: 70 MMHG | SYSTOLIC BLOOD PRESSURE: 106 MMHG | HEART RATE: 60 BPM | BODY MASS INDEX: 26.99 KG/M2 | HEIGHT: 65 IN | OXYGEN SATURATION: 97 %

## 2023-08-28 DIAGNOSIS — G47.33 OBSTRUCTIVE SLEEP APNEA SYNDROME: ICD-10-CM

## 2023-08-28 DIAGNOSIS — R07.9 CHEST PAIN WITH HIGH RISK FOR CARDIAC ETIOLOGY: ICD-10-CM

## 2023-08-28 DIAGNOSIS — Z01.812 PRE-PROCEDURE LAB EXAM: Primary | ICD-10-CM

## 2023-08-28 DIAGNOSIS — R55 SYNCOPE AND COLLAPSE: ICD-10-CM

## 2023-08-28 DIAGNOSIS — R94.31 ABNORMAL EKG: ICD-10-CM

## 2023-08-28 DIAGNOSIS — R94.39 ABNORMAL NUCLEAR STRESS TEST: Primary | ICD-10-CM

## 2023-08-28 DIAGNOSIS — R94.39 ABNORMAL NUCLEAR STRESS TEST: ICD-10-CM

## 2023-08-28 DIAGNOSIS — I10 PRIMARY HYPERTENSION: ICD-10-CM

## 2023-08-28 DIAGNOSIS — E78.00 HYPERCHOLESTEROLEMIA: ICD-10-CM

## 2023-08-28 PROCEDURE — 1036F TOBACCO NON-USER: CPT | Performed by: NURSE PRACTITIONER

## 2023-08-28 PROCEDURE — G8419 CALC BMI OUT NRM PARAM NOF/U: HCPCS | Performed by: NURSE PRACTITIONER

## 2023-08-28 PROCEDURE — 1090F PRES/ABSN URINE INCON ASSESS: CPT | Performed by: NURSE PRACTITIONER

## 2023-08-28 PROCEDURE — G8427 DOCREV CUR MEDS BY ELIG CLIN: HCPCS | Performed by: NURSE PRACTITIONER

## 2023-08-28 PROCEDURE — 3074F SYST BP LT 130 MM HG: CPT | Performed by: NURSE PRACTITIONER

## 2023-08-28 PROCEDURE — 1123F ACP DISCUSS/DSCN MKR DOCD: CPT | Performed by: NURSE PRACTITIONER

## 2023-08-28 PROCEDURE — G8399 PT W/DXA RESULTS DOCUMENT: HCPCS | Performed by: NURSE PRACTITIONER

## 2023-08-28 PROCEDURE — 99214 OFFICE O/P EST MOD 30 MIN: CPT | Performed by: NURSE PRACTITIONER

## 2023-08-28 PROCEDURE — 3078F DIAST BP <80 MM HG: CPT | Performed by: NURSE PRACTITIONER

## 2023-08-28 PROCEDURE — 3017F COLORECTAL CA SCREEN DOC REV: CPT | Performed by: NURSE PRACTITIONER

## 2023-08-28 RX ORDER — ATORVASTATIN CALCIUM 40 MG/1
40 TABLET, FILM COATED ORAL DAILY
Qty: 90 TABLET | Refills: 1 | Status: SHIPPED | OUTPATIENT
Start: 2023-08-28

## 2023-08-28 ASSESSMENT — VISUAL ACUITY: OU: 1

## 2023-08-28 ASSESSMENT — PATIENT HEALTH QUESTIONNAIRE - PHQ9
SUM OF ALL RESPONSES TO PHQ9 QUESTIONS 1 & 2: 0
SUM OF ALL RESPONSES TO PHQ QUESTIONS 1-9: 0
SUM OF ALL RESPONSES TO PHQ QUESTIONS 1-9: 0
1. LITTLE INTEREST OR PLEASURE IN DOING THINGS: 0
SUM OF ALL RESPONSES TO PHQ QUESTIONS 1-9: 0
SUM OF ALL RESPONSES TO PHQ QUESTIONS 1-9: 0
2. FEELING DOWN, DEPRESSED OR HOPELESS: 0

## 2023-08-28 ASSESSMENT — ENCOUNTER SYMPTOMS: SHORTNESS OF BREATH: 1

## 2023-08-28 NOTE — TELEPHONE ENCOUNTER
Spoke with Pt of [facility] hellen FRANKS/Dr. Vicky Gardner at [facility] For 9/6/23 At 1045am  arrive at 8:45am Pt aware that they need a  NPO from [address] the night before. Check in at the [facility] floor Outpt. Reg. Desk. Pt is to have Labs done between 8/28/23-8/31/23. Medications:   Your Medication are ok to take   VO by /Nahid Engle NP/nurse Noreen Olivier

## 2023-08-28 NOTE — PROGRESS NOTES
30 day event monitor placed on patient. Patient educated on device and its use. Also reviewed materials provided in kit. Calendar provided to patient with dates to charge device. Extra supplies available within kit. Patient to return the device to [facility] via Fed-ex after order duration. Contact information provided to the department for any questions. No questions at this time.      [employer] Monitor # [id number]

## 2023-08-29 DIAGNOSIS — R06.09 DYSPNEA ON EXERTION: ICD-10-CM

## 2023-08-29 DIAGNOSIS — R94.39 ABNORMAL NUCLEAR STRESS TEST: ICD-10-CM

## 2023-08-29 DIAGNOSIS — R60.9 EDEMA, UNSPECIFIED TYPE: ICD-10-CM

## 2023-08-29 DIAGNOSIS — Z01.812 PRE-PROCEDURE LAB EXAM: ICD-10-CM

## 2023-08-30 LAB
ANION GAP SERPL CALC-SCNC: 7 MMOL/L (ref 5–15)
BUN SERPL-MCNC: 7 MG/DL (ref 6–20)
BUN/CREAT SERPL: 13 (ref 12–20)
CALCIUM SERPL-MCNC: 9.5 MG/DL (ref 8.5–10.1)
CHLORIDE SERPL-SCNC: 108 MMOL/L (ref 97–108)
CO2 SERPL-SCNC: 25 MMOL/L (ref 21–32)
CREAT SERPL-MCNC: 0.56 MG/DL (ref 0.55–1.02)
ERYTHROCYTE [DISTWIDTH] IN BLOOD BY AUTOMATED COUNT: 12.6 % (ref 11.5–14.5)
GLUCOSE SERPL-MCNC: 105 MG/DL (ref 65–100)
HCT VFR BLD AUTO: 40.7 % (ref 35–47)
HGB BLD-MCNC: 13.2 G/DL (ref 11.5–16)
MCH RBC QN AUTO: 32 PG (ref 26–34)
MCHC RBC AUTO-ENTMCNC: 32.4 G/DL (ref 30–36.5)
MCV RBC AUTO: 98.8 FL (ref 80–99)
NRBC # BLD: 0 K/UL (ref 0–0.01)
NRBC BLD-RTO: 0 PER 100 WBC
NT PRO BNP: 414 PG/ML
PLATELET # BLD AUTO: 215 K/UL (ref 150–400)
PMV BLD AUTO: 10.7 FL (ref 8.9–12.9)
POTASSIUM SERPL-SCNC: 4.4 MMOL/L (ref 3.5–5.1)
RBC # BLD AUTO: 4.12 M/UL (ref 3.8–5.2)
SODIUM SERPL-SCNC: 140 MMOL/L (ref 136–145)
WBC # BLD AUTO: 4.7 K/UL (ref 3.6–11)

## 2023-09-01 ENCOUNTER — TELEPHONE (OUTPATIENT)
Age: 68
End: 2023-09-01

## 2023-09-01 RX ORDER — SODIUM CHLORIDE 0.9 % (FLUSH) 0.9 %
5-40 SYRINGE (ML) INJECTION PRN
OUTPATIENT
Start: 2023-09-01

## 2023-09-01 RX ORDER — SODIUM CHLORIDE 9 MG/ML
INJECTION, SOLUTION INTRAVENOUS CONTINUOUS
OUTPATIENT
Start: 2023-09-01

## 2023-09-01 NOTE — TELEPHONE ENCOUNTER
Returned pt call re event monitor. Per Harsha Morillo NP,pt needs to keep event monitor on until cardiac cath procedure. Pt verbalized understanding and all questions answered.

## 2023-09-01 NOTE — TELEPHONE ENCOUNTER
Spoke with patient regarding question about her event monitor. Pt states she is to have cardiac cath next Wednesday and wants to know if she needs to keep monitor on until then or take it off. Will pass message to Rachel Nieto NP and give pt a call back with recommendations.

## 2023-09-05 NOTE — TELEPHONE ENCOUNTER
Spoke To Pt:  Just a reminder not to forget yourLHC w/Dr. Mariella Higgins at [facility] scheduled for tomorrow. Arriving at 8:45am  You will need a    NPO from midnight   check in at the First floor outpt. reg. Desk.   Medications:  Medications are ok to take  VO by /Nahid Villarreal np/nurse Elizabeth Ríos

## 2023-09-06 ENCOUNTER — HOSPITAL ENCOUNTER (OUTPATIENT)
Facility: HOSPITAL | Age: 68
Discharge: HOME | End: 2023-09-06
Attending: INTERNAL MEDICINE | Admitting: INTERNAL MEDICINE
Payer: MEDICARE

## 2023-09-06 VITALS
TEMPERATURE: 98 F | DIASTOLIC BLOOD PRESSURE: 92 MMHG | SYSTOLIC BLOOD PRESSURE: 133 MMHG | WEIGHT: 160 LBS | RESPIRATION RATE: 17 BRPM | HEART RATE: 54 BPM | BODY MASS INDEX: 26.66 KG/M2 | OXYGEN SATURATION: 92 % | HEIGHT: 65 IN

## 2023-09-06 DIAGNOSIS — I15.9 SECONDARY HYPERTENSION: ICD-10-CM

## 2023-09-06 LAB — ECHO BSA: 1.82 M2

## 2023-09-06 PROCEDURE — 93458 L HRT ARTERY/VENTRICLE ANGIO: CPT | Performed by: INTERNAL MEDICINE

## 2023-09-06 PROCEDURE — 76937 US GUIDE VASCULAR ACCESS: CPT | Performed by: INTERNAL MEDICINE

## 2023-09-06 PROCEDURE — C1713 ANCHOR/SCREW BN/BN,TIS/BN: HCPCS | Performed by: INTERNAL MEDICINE

## 2023-09-06 PROCEDURE — 2709999900 HC NON-CHARGEABLE SUPPLY: Performed by: INTERNAL MEDICINE

## 2023-09-06 PROCEDURE — 2580000003 HC RX 258: Performed by: NURSE PRACTITIONER

## 2023-09-06 PROCEDURE — 2500000003 HC RX 250 WO HCPCS: Performed by: INTERNAL MEDICINE

## 2023-09-06 PROCEDURE — C1769 GUIDE WIRE: HCPCS | Performed by: INTERNAL MEDICINE

## 2023-09-06 PROCEDURE — C1894 INTRO/SHEATH, NON-LASER: HCPCS | Performed by: INTERNAL MEDICINE

## 2023-09-06 PROCEDURE — 2580000003 HC RX 258: Performed by: INTERNAL MEDICINE

## 2023-09-06 PROCEDURE — 6360000002 HC RX W HCPCS: Performed by: INTERNAL MEDICINE

## 2023-09-06 PROCEDURE — 99153 MOD SED SAME PHYS/QHP EA: CPT | Performed by: INTERNAL MEDICINE

## 2023-09-06 PROCEDURE — 99152 MOD SED SAME PHYS/QHP 5/>YRS: CPT | Performed by: INTERNAL MEDICINE

## 2023-09-06 PROCEDURE — 6360000004 HC RX CONTRAST MEDICATION: Performed by: INTERNAL MEDICINE

## 2023-09-06 RX ORDER — VERAPAMIL HYDROCHLORIDE 2.5 MG/ML
INJECTION, SOLUTION INTRAVENOUS PRN
Status: DISCONTINUED | OUTPATIENT
Start: 2023-09-06 | End: 2023-09-06 | Stop reason: HOSPADM

## 2023-09-06 RX ORDER — HEPARIN SODIUM 1000 [USP'U]/ML
INJECTION, SOLUTION INTRAVENOUS; SUBCUTANEOUS PRN
Status: DISCONTINUED | OUTPATIENT
Start: 2023-09-06 | End: 2023-09-06 | Stop reason: HOSPADM

## 2023-09-06 RX ORDER — LIDOCAINE HYDROCHLORIDE 10 MG/ML
INJECTION, SOLUTION INFILTRATION; PERINEURAL PRN
Status: DISCONTINUED | OUTPATIENT
Start: 2023-09-06 | End: 2023-09-06 | Stop reason: HOSPADM

## 2023-09-06 RX ORDER — MIDAZOLAM HYDROCHLORIDE 1 MG/ML
INJECTION INTRAMUSCULAR; INTRAVENOUS PRN
Status: DISCONTINUED | OUTPATIENT
Start: 2023-09-06 | End: 2023-09-06 | Stop reason: HOSPADM

## 2023-09-06 RX ORDER — HEPARIN SODIUM 200 [USP'U]/100ML
INJECTION, SOLUTION INTRAVENOUS CONTINUOUS PRN
Status: COMPLETED | OUTPATIENT
Start: 2023-09-06 | End: 2023-09-06

## 2023-09-06 RX ORDER — FENTANYL CITRATE 50 UG/ML
INJECTION, SOLUTION INTRAMUSCULAR; INTRAVENOUS PRN
Status: DISCONTINUED | OUTPATIENT
Start: 2023-09-06 | End: 2023-09-06 | Stop reason: HOSPADM

## 2023-09-06 RX ORDER — SODIUM CHLORIDE 9 MG/ML
INJECTION, SOLUTION INTRAVENOUS CONTINUOUS
Status: DISCONTINUED | OUTPATIENT
Start: 2023-09-06 | End: 2023-09-06 | Stop reason: HOSPADM

## 2023-09-06 RX ORDER — SODIUM CHLORIDE 0.9 % (FLUSH) 0.9 %
5-40 SYRINGE (ML) INJECTION PRN
Status: DISCONTINUED | OUTPATIENT
Start: 2023-09-06 | End: 2023-09-06 | Stop reason: HOSPADM

## 2023-09-06 RX ORDER — SODIUM CHLORIDE 9 MG/ML
INJECTION, SOLUTION INTRAVENOUS CONTINUOUS PRN
Status: COMPLETED | OUTPATIENT
Start: 2023-09-06 | End: 2023-09-06

## 2023-09-06 RX ADMIN — SODIUM CHLORIDE: 9 INJECTION, SOLUTION INTRAVENOUS at 12:10

## 2023-09-06 NOTE — PROGRESS NOTES
1330: Ambulated patient to the bathroom with a steady gait, voided without difficulty. Patient denies chest pain, shortness of breath, or dizziness. [facility] Vital signs stable. 1330: dressing applied. Procedural site is clean, dry, and intact. No bleeding, no hematoma. 1345: Patient dressed self.     1400: Educated patient about their sedation precautions such as not driving, operating any machinery, or signing legal documents 24 hours post procedure. Reviewed discharge instructions, including medications and site care using the teach back method. Answered all questions. Verbalized understanding. 1405: Removed peripheral IV.    1410:Escorted to discharge area in a wheelchair with all of their belongings including book    Patient's relative present to take patient home. Reviewed discharge instructions with patients' relative, they verbalized understanding.

## 2023-09-06 NOTE — PROGRESS NOTES
Cardiac Cath Lab Recovery Arrival Note:      Roseline Baumgarten arrived to Cardiac Cath Lab, Recovery Area. Staff introduced to patient. Patient identifiers verified with NAME and DATE OF BIRTH. Procedure verified with patient. Consent forms reviewed and signed by patient or authorized representative and verified. Allergies verified. Patient and family oriented to department. Patient and family informed of procedure and plan of care. Questions answered with review. Patient prepped for procedure, per orders from physician, prior to arrival.    Patient on cardiac monitor, non-invasive blood pressure, SPO2 monitor. On RA. Patient is A&Ox 4. Patient reports no complaints. Patient in stretcher, in low position, with side rails up, call bell within reach, patient instructed to call if assistance as needed. Patient prep in: Fast Track 5. Patient family has pager #   Family in: Melisa Grimes, [phone].    Prep by: Shannon Hurst

## 2023-09-06 NOTE — PROGRESS NOTES
Cardiac Cath Lab Procedure Area Arrival Note:    Danis Vargas arrived to Cardiac Cath Lab, Procedure Area. Patient identifiers verified with NAME and DATE OF BIRTH. Procedure verified with patient. Consent forms verified. Allergies verified. Patient informed of procedure and plan of care. Questions answered with review. Patient voiced understanding of procedure and plan of care. Patient on cardiac monitor, non-invasive blood pressure, SPO2 monitor. On RA and placed on O2 @ 2 lpm via NC.  IV of NS on pump at 75 ml/hr. Patient status doing well without problems. Patient is A&Ox 4. Patient reports no CP and no SOB. Patient medicated during procedure with orders obtained and verified by Dr. William Smith. Refer to patients Cardiac Cath Lab PROCEDURE REPORT for vital signs, assessment, status, and response during procedure, printed at end of case. Printed report on chart or scanned into chart. 1138 CATH LAB to RECOVERY ROOM REPORT    Procedure: [facility]    MD: Idalia Schreiber. William Smith MD    The procedure was diagnostic only. Verbal Report given to Recovery Nurse on patient being transferred to Cardiac Cath Lab RR for routine post-op. Patient stable upon transfer to RR. Vitals, mental status, MAR, procedural summary discussed with recovery RN.     Medication given during procedure:    Contrast:60mL                          Heparin:2,500units     Versed:4mg                                                               Fentanyl:50mcg                                                               Sheaths:    Right radial sheath pulled at 1133 , band at 12mL of air

## 2023-09-07 DIAGNOSIS — M81.0 OSTEOPOROSIS, UNSPECIFIED OSTEOPOROSIS TYPE, UNSPECIFIED PATHOLOGICAL FRACTURE PRESENCE: ICD-10-CM

## 2023-09-07 DIAGNOSIS — Z79.899 OTHER LONG TERM (CURRENT) DRUG THERAPY: Primary | ICD-10-CM

## 2023-09-11 ENCOUNTER — HOSPITAL ENCOUNTER (OUTPATIENT)
Facility: HOSPITAL | Age: 68
Setting detail: INFUSION SERIES
End: 2023-09-11
Payer: MEDICARE

## 2023-09-11 VITALS
TEMPERATURE: 98 F | HEART RATE: 58 BPM | RESPIRATION RATE: 18 BRPM | DIASTOLIC BLOOD PRESSURE: 74 MMHG | SYSTOLIC BLOOD PRESSURE: 99 MMHG

## 2023-09-11 DIAGNOSIS — M81.0 OSTEOPOROSIS, UNSPECIFIED OSTEOPOROSIS TYPE, UNSPECIFIED PATHOLOGICAL FRACTURE PRESENCE: ICD-10-CM

## 2023-09-11 DIAGNOSIS — Z79.899 OTHER LONG TERM (CURRENT) DRUG THERAPY: Primary | ICD-10-CM

## 2023-09-11 LAB
ALBUMIN SERPL-MCNC: 3.4 G/DL (ref 3.5–5)
ALBUMIN/GLOB SERPL: 0.9 (ref 1.1–2.2)
ALP SERPL-CCNC: 82 U/L (ref 45–117)
ALT SERPL-CCNC: 27 U/L (ref 12–78)
ANION GAP SERPL CALC-SCNC: 10 MMOL/L (ref 5–15)
AST SERPL-CCNC: 26 U/L (ref 15–37)
BILIRUB SERPL-MCNC: 0.6 MG/DL (ref 0.2–1)
BUN SERPL-MCNC: 10 MG/DL (ref 6–20)
BUN/CREAT SERPL: 17 (ref 12–20)
CALCIUM SERPL-MCNC: 8.3 MG/DL (ref 8.5–10.1)
CHLORIDE SERPL-SCNC: 103 MMOL/L (ref 97–108)
CO2 SERPL-SCNC: 26 MMOL/L (ref 21–32)
CREAT SERPL-MCNC: 0.59 MG/DL (ref 0.55–1.02)
GLOBULIN SER CALC-MCNC: 3.7 G/DL (ref 2–4)
GLUCOSE SERPL-MCNC: 86 MG/DL (ref 65–100)
MAGNESIUM SERPL-MCNC: 1.9 MG/DL (ref 1.6–2.4)
PHOSPHATE SERPL-MCNC: 4.5 MG/DL (ref 2.6–4.7)
POTASSIUM SERPL-SCNC: 4.1 MMOL/L (ref 3.5–5.1)
PROT SERPL-MCNC: 7.1 G/DL (ref 6.4–8.2)
SODIUM SERPL-SCNC: 139 MMOL/L (ref 136–145)

## 2023-09-11 PROCEDURE — 6360000002 HC RX W HCPCS: Performed by: FAMILY MEDICINE

## 2023-09-11 PROCEDURE — 83735 ASSAY OF MAGNESIUM: CPT

## 2023-09-11 PROCEDURE — 36415 COLL VENOUS BLD VENIPUNCTURE: CPT

## 2023-09-11 PROCEDURE — 96372 THER/PROPH/DIAG INJ SC/IM: CPT

## 2023-09-11 PROCEDURE — 80053 COMPREHEN METABOLIC PANEL: CPT

## 2023-09-11 PROCEDURE — 84100 ASSAY OF PHOSPHORUS: CPT

## 2023-09-11 RX ORDER — EPINEPHRINE 1 MG/ML
0.3 INJECTION, SOLUTION, CONCENTRATE INTRAVENOUS PRN
OUTPATIENT
Start: 2023-12-16

## 2023-09-11 RX ORDER — ALBUTEROL SULFATE 90 UG/1
4 AEROSOL, METERED RESPIRATORY (INHALATION) PRN
OUTPATIENT
Start: 2023-12-16

## 2023-09-11 RX ORDER — SODIUM CHLORIDE 9 MG/ML
INJECTION, SOLUTION INTRAVENOUS CONTINUOUS
OUTPATIENT
Start: 2023-12-16

## 2023-09-11 RX ORDER — DIPHENHYDRAMINE HYDROCHLORIDE 50 MG/ML
50 INJECTION INTRAMUSCULAR; INTRAVENOUS
OUTPATIENT
Start: 2023-12-16

## 2023-09-11 RX ORDER — ONDANSETRON 2 MG/ML
8 INJECTION INTRAMUSCULAR; INTRAVENOUS
OUTPATIENT
Start: 2023-12-16

## 2023-09-11 RX ORDER — ACETAMINOPHEN 325 MG/1
650 TABLET ORAL
OUTPATIENT
Start: 2023-12-16

## 2023-09-11 RX ADMIN — DENOSUMAB 60 MG: 60 INJECTION SUBCUTANEOUS at 14:59

## 2023-09-11 NOTE — DISCHARGE INSTRUCTIONS
denosumab (Prolia)  Pronunciation:  zoie couch mab  Brand:  Prolia  What is the most important information I should know about Prolia? This medication guide provides information about the Prolia brand of denosumab. Yevonne Bernadette is another brand of denosumab used to prevent bone fractures and other skeletal conditions in people with tumors that have spread to the bone. Prolia can cause many serious side effects. Call your doctor at once if you have a fever, chills, pain or burning when you urinate, severe stomach pain, cough, shortness of breath, skin problems, numbness or tingling, severe or unusual pain, or skin problems. Do not use if you are pregnant. Use effective birth control while using Prolia, and for at least 5 months after you stop. What is denosumab (Prolia)? Denosumab is a monoclonal antibody. Monoclonal antibodies are made to target and destroy only certain cells in the body. This may help to protect healthy cells from damage. The Prolia brand of denosumab is used to treat osteoporosis or bone loss in men and women who have a high risk of bone fracture. Prolia is sometimes used in people whose bone fracture is caused by certain medicines or cancer treatments. This medication guide provides information about the Prolia brand of denosumab. Yevonne [place] is another brand of denosumab used to prevent bone fractures and other skeletal conditions in people with tumors that have spread to the bone. Denosumab may also be used for purposes not listed in this medication guide. What should I discuss with my healthcare provider before receiving Prolia? You should not receive Prolia if you are allergic to denosumab, or if you have:  low levels of calcium in your blood (hypocalcemia); or  if you are pregnant. While you are using Prolia, you should not receive Xgeva, another brand of denosumab.   Tell your doctor if you have ever had:  kidney disease (or if you are on dialysis);  a weak immune system (caused by

## 2023-09-11 NOTE — PROGRESS NOTES
hospitals Progress Note  Date: September 11, 2023    Name: Sanjeev Machado Short Visit Note:    5192:  Pt arrived ambulatory and in no distress for Prolia. Labs drawn peripherally via R A/C and processed. Assessment unremarkable with no concerns voiced. Calcium value of 9.5 from 8/29 used for today's treatment. Problem: Safety - Adult  Goal: Free from fall injury  Outcome: Progressing     Problem: Chronic Conditions and Co-morbidities  Goal: Patient's chronic conditions and co-morbidity symptoms are monitored and maintained or improved  Outcome: Progressing     Problem: Discharge Planning  Goal: Discharge to home or other facility with appropriate resources  Outcome: Progressing  Recent Results (from the past 12 hour(s))   Phosphorus    Collection Time: 09/11/23  2:22 PM   Result Value Ref Range    Phosphorus 4.5 2.6 - 4.7 MG/DL   Magnesium    Collection Time: 09/11/23  2:22 PM   Result Value Ref Range    Magnesium 1.9 1.6 - 2.4 mg/dL   Comprehensive Metabolic Panel    Collection Time: 09/11/23  2:22 PM   Result Value Ref Range    Sodium 139 136 - 145 mmol/L    Potassium 4.1 3.5 - 5.1 mmol/L    Chloride 103 97 - 108 mmol/L    CO2 26 21 - 32 mmol/L    Anion Gap 10 5 - 15 mmol/L    Glucose 86 65 - 100 mg/dL    BUN 10 6 - 20 MG/DL    Creatinine 0.59 0.55 - 1.02 MG/DL    Bun/Cre Ratio 17 12 - 20      Est, Glom Filt Rate >60 >60 ml/min/1.73m2    Calcium 8.3 (L) 8.5 - 10.1 MG/DL    Total Bilirubin 0.6 0.2 - 1.0 MG/DL    ALT 27 12 - 78 U/L    AST 26 15 - 37 U/L    Alk Phosphatase 82 45 - 117 U/L    Total Protein 7.1 6.4 - 8.2 g/dL    Albumin 3.4 (L) 3.5 - 5.0 g/dL    Globulin 3.7 2.0 - 4.0 g/dL    Albumin/Globulin Ratio 0.9 (L) 1.1 - 2.2       Medications Administered         denosumab (PROLIA) SC injection 60 mg Admin Date  09/11/2023 Action  Given Dose  60 mg Route  SubCUTAneous Administered By  Kelsi Vasquez RN           Received injection in R arm and tolerated well.   D/Cd from [facility] ambulatory and in no distress.       Future Appointments   Date Time Provider [address]   9/29/2023  8:00 AM James Rojas MD [facility] BS AMB   10/3/2023 11:30 AM Sujatha Owusu MD [facility] BS AMB   10/17/2023  3:00 PM Qi Kohler MD [facility] BS AMB   3/4/2024 12:30 PM CAMILLE ROSS [facility] BS AMB   3/4/2024  1:00 PM Grace Malcolm MD [facility] BS AMB   3/11/2024  1:00 PM [facility] INFUSION NURSE 1 [facility]   3/13/2024 12:40 PM CAMILLE ROMEO [facility] BS AMB   3/13/2024  1:00 PM Grace Malcolm MD [facility] BS AMB     Pavel Marks, CL[place]ORD  September 11, 2023

## 2023-09-28 NOTE — PROGRESS NOTES
Neurology Progress Note    Patient ID:  Bonnie Balbuena  [id number]  78 y.o.  1955      Subjective:   History:  Bonnie Balbuena is a female who  has a past medical history of Allergic conjunctivitis of both eyes (10/19/2015), Brain tumor ([address]), Depression (09/11/2014), Essential hypertension (12/15/2017), Hot flashes (06/23/2014) Hypercholesterolemia, and Osteoarthritis of right knee (03/11/2014) who since 2021, noted tremors of both hands L worse than R, when resting and writing. Was on Propanolol. Consideration includes essential tremor (although no tremor observed during her visit today). The absence of other extrapyramidal features on exam makes Parkinson's disease less likely. Patient also started having balance issues 6 months ago, with tendency to fall to the L side when bending over and problem initiating gait. (-) dizziness (-) lightheadedness. Consideration whether this is related to 1.8 x 1.5 x 2.0 cm avidly enhancing mass in the region of the pineal gland. Mild mass effect on the superior tectum, without parenchymal edema. Also has small 6 mm meningioma at the left superior frontal convexity. Third, patient noted on and off bilateral hand and feet numbness, involving all fingers and big toes. . Considerations include carpal tunnel syndrome and lumbar radiculopathy (chronic lower back pain since falling; L sided, non-radiating). Lastly, patient is also complaining of cognitive issues. Patient is now on Gabapentin 300 mg BID with benefit for her lower back. Had kyphoplasty  Saw Dr Claudia Mejias for brain mass       Neuropsychological testing (6/15/23)  Impressions & Recommendations: This is a mixed normal/abnormal range Neuropsychological Evaluation with respect to neurocognitive functioning. In this regard, she is showing impairments with verbal fluency, sustained attention, and bilateral fine motor dexterity.   Otherwise, her mental status,confrontation naming, verbal comprehension, perceptual

## 2023-09-29 ENCOUNTER — OFFICE VISIT (OUTPATIENT)
Age: 68
End: 2023-09-29
Payer: MEDICARE

## 2023-09-29 VITALS
TEMPERATURE: 96 F | DIASTOLIC BLOOD PRESSURE: 70 MMHG | SYSTOLIC BLOOD PRESSURE: 120 MMHG | BODY MASS INDEX: 26.63 KG/M2 | HEIGHT: 65 IN | OXYGEN SATURATION: 95 % | HEART RATE: 86 BPM

## 2023-09-29 DIAGNOSIS — G31.84 MILD COGNITIVE IMPAIRMENT: Primary | ICD-10-CM

## 2023-09-29 DIAGNOSIS — M54.50 CHRONIC LOW BACK PAIN, UNSPECIFIED BACK PAIN LATERALITY, UNSPECIFIED WHETHER SCIATICA PRESENT: ICD-10-CM

## 2023-09-29 DIAGNOSIS — G89.29 CHRONIC LOW BACK PAIN, UNSPECIFIED BACK PAIN LATERALITY, UNSPECIFIED WHETHER SCIATICA PRESENT: ICD-10-CM

## 2023-09-29 DIAGNOSIS — R90.89 ABNORMAL BRAIN MRI: ICD-10-CM

## 2023-09-29 DIAGNOSIS — G95.9 DISEASE OF SPINAL CORD, UNSPECIFIED (HCC): ICD-10-CM

## 2023-09-29 PROBLEM — I20.9 ANGINA PECTORIS, UNSPECIFIED (HCC): Status: ACTIVE | Noted: 2023-09-29

## 2023-09-29 PROBLEM — I25.119 ATHEROSCLEROTIC HEART DISEASE OF NATIVE CORONARY ARTERY WITH UNSPECIFIED ANGINA PECTORIS (HCC): Status: ACTIVE | Noted: 2023-09-29

## 2023-09-29 PROCEDURE — 1036F TOBACCO NON-USER: CPT | Performed by: PSYCHIATRY & NEUROLOGY

## 2023-09-29 PROCEDURE — 3078F DIAST BP <80 MM HG: CPT | Performed by: PSYCHIATRY & NEUROLOGY

## 2023-09-29 PROCEDURE — G8419 CALC BMI OUT NRM PARAM NOF/U: HCPCS | Performed by: PSYCHIATRY & NEUROLOGY

## 2023-09-29 PROCEDURE — G8399 PT W/DXA RESULTS DOCUMENT: HCPCS | Performed by: PSYCHIATRY & NEUROLOGY

## 2023-09-29 PROCEDURE — 1090F PRES/ABSN URINE INCON ASSESS: CPT | Performed by: PSYCHIATRY & NEUROLOGY

## 2023-09-29 PROCEDURE — 3017F COLORECTAL CA SCREEN DOC REV: CPT | Performed by: PSYCHIATRY & NEUROLOGY

## 2023-09-29 PROCEDURE — 1123F ACP DISCUSS/DSCN MKR DOCD: CPT | Performed by: PSYCHIATRY & NEUROLOGY

## 2023-09-29 PROCEDURE — 99215 OFFICE O/P EST HI 40 MIN: CPT | Performed by: PSYCHIATRY & NEUROLOGY

## 2023-09-29 PROCEDURE — G8428 CUR MEDS NOT DOCUMENT: HCPCS | Performed by: PSYCHIATRY & NEUROLOGY

## 2023-09-29 PROCEDURE — 3074F SYST BP LT 130 MM HG: CPT | Performed by: PSYCHIATRY & NEUROLOGY

## 2023-09-29 RX ORDER — ATOMOXETINE 40 MG/1
40 CAPSULE ORAL DAILY
Qty: 30 CAPSULE | Refills: 3 | Status: SHIPPED | OUTPATIENT
Start: 2023-09-29

## 2023-10-03 ENCOUNTER — OFFICE VISIT (OUTPATIENT)
Facility: CLINIC | Age: 68
End: 2023-10-03
Payer: MEDICARE

## 2023-10-03 VITALS
OXYGEN SATURATION: 97 % | RESPIRATION RATE: 20 BRPM | DIASTOLIC BLOOD PRESSURE: 86 MMHG | HEART RATE: 66 BPM | WEIGHT: 164 LBS | BODY MASS INDEX: 27.32 KG/M2 | TEMPERATURE: 97.5 F | HEIGHT: 65 IN | SYSTOLIC BLOOD PRESSURE: 125 MMHG

## 2023-10-03 DIAGNOSIS — Z12.11 COLON CANCER SCREENING: ICD-10-CM

## 2023-10-03 DIAGNOSIS — M81.0 OSTEOPOROSIS, UNSPECIFIED OSTEOPOROSIS TYPE, UNSPECIFIED PATHOLOGICAL FRACTURE PRESENCE: ICD-10-CM

## 2023-10-03 DIAGNOSIS — F41.9 ANXIETY: ICD-10-CM

## 2023-10-03 DIAGNOSIS — F33.1 MODERATE EPISODE OF RECURRENT MAJOR DEPRESSIVE DISORDER (HCC): Primary | ICD-10-CM

## 2023-10-03 DIAGNOSIS — D49.7 PINEAL TUMOR: ICD-10-CM

## 2023-10-03 PROCEDURE — G8428 CUR MEDS NOT DOCUMENT: HCPCS | Performed by: FAMILY MEDICINE

## 2023-10-03 PROCEDURE — 1123F ACP DISCUSS/DSCN MKR DOCD: CPT | Performed by: FAMILY MEDICINE

## 2023-10-03 PROCEDURE — 3017F COLORECTAL CA SCREEN DOC REV: CPT | Performed by: FAMILY MEDICINE

## 2023-10-03 PROCEDURE — 1090F PRES/ABSN URINE INCON ASSESS: CPT | Performed by: FAMILY MEDICINE

## 2023-10-03 PROCEDURE — G8484 FLU IMMUNIZE NO ADMIN: HCPCS | Performed by: FAMILY MEDICINE

## 2023-10-03 PROCEDURE — 3079F DIAST BP 80-89 MM HG: CPT | Performed by: FAMILY MEDICINE

## 2023-10-03 PROCEDURE — G8399 PT W/DXA RESULTS DOCUMENT: HCPCS | Performed by: FAMILY MEDICINE

## 2023-10-03 PROCEDURE — G8419 CALC BMI OUT NRM PARAM NOF/U: HCPCS | Performed by: FAMILY MEDICINE

## 2023-10-03 PROCEDURE — 3074F SYST BP LT 130 MM HG: CPT | Performed by: FAMILY MEDICINE

## 2023-10-03 PROCEDURE — 1036F TOBACCO NON-USER: CPT | Performed by: FAMILY MEDICINE

## 2023-10-03 PROCEDURE — 99214 OFFICE O/P EST MOD 30 MIN: CPT | Performed by: FAMILY MEDICINE

## 2023-10-03 SDOH — ECONOMIC STABILITY: HOUSING INSECURITY
IN THE LAST 12 MONTHS, WAS THERE A TIME WHEN YOU DID NOT HAVE A STEADY PLACE TO SLEEP OR SLEPT IN A SHELTER (INCLUDING NOW)?: NO

## 2023-10-03 SDOH — ECONOMIC STABILITY: FOOD INSECURITY: WITHIN THE PAST 12 MONTHS, YOU WORRIED THAT YOUR FOOD WOULD RUN OUT BEFORE YOU GOT MONEY TO BUY MORE.: NEVER TRUE

## 2023-10-03 SDOH — ECONOMIC STABILITY: INCOME INSECURITY: HOW HARD IS IT FOR YOU TO PAY FOR THE VERY BASICS LIKE FOOD, HOUSING, MEDICAL CARE, AND HEATING?: NOT HARD AT ALL

## 2023-10-03 SDOH — ECONOMIC STABILITY: FOOD INSECURITY: WITHIN THE PAST 12 MONTHS, THE FOOD YOU BOUGHT JUST DIDN'T LAST AND YOU DIDN'T HAVE MONEY TO GET MORE.: NEVER TRUE

## 2023-10-03 NOTE — PROGRESS NOTES
Chief Complaint   Patient presents with    Follow-up     Mood Management - pt states she feels \"good\" but still the same     Medication Management     Discuss side effects of Prolia- sick in move with headaches unable to move, MRI with contrast was the next day     Gastroesophageal Reflux     Has never taken anything OTC for this

## 2023-10-03 NOTE — ASSESSMENT & PLAN NOTE
currently covered with appropriate therapy but some question of side effects. Could be that headache is related, discussed trialing one additional dosage in 6 months vs changing to bisphosphonate (relcast IV vs oral fosamax). - patient would like to trial Prolia one more time, if intolerable will switch to Reclast therapy.

## 2023-10-03 NOTE — ASSESSMENT & PLAN NOTE
Borderline controlled, continue medication, get set up with psychotherapist. see back i n6 weeks for mood maangement. consider a change in medication (propranolol or buspar) for another med such as SSRI, or remeron at night.

## 2023-10-03 NOTE — PROGRESS NOTES
ASSESSMENT/PLAN:  Ms. Vicky Aguilera is a 78 y.o. female established patient who presents for Follow-up (Mood Management - pt states she feels \"good\" but still the same ), Medication Management (Discuss side effects of Prolia- sick in move with headaches unable to move, MRI with contrast was the next day ), and Gastroesophageal Reflux (Has never taken anything OTC for this )  , found to have the followin. Moderate episode of recurrent major depressive disorder ([address])  2. Anxiety  Assessment & Plan:   Borderline controlled, continue medication, get set up with psychotherapist. see back i n6 weeks for mood maangement. consider a change in medication (propranolol or buspar) for another med such as SSRI, or remeron at night. 3. Osteoporosis, unspecified osteoporosis type, unspecified pathological fracture presence  Assessment & Plan:   currently covered with appropriate therapy but some question of side effects. Could be that headache is related, discussed trialing one additional dosage in 6 months vs changing to bisphosphonate (relcast IV vs oral fosamax). - patient would like to trial Prolia one more time, if intolerable will switch to Reclast therapy. 4. Pineal tumor  Assessment & Plan:   Monitored by specialist- no acute findings meriting change in the plan  5. Colon cancer screening  -     [facility] - Laurie Jones MD, Gastroenterology, [place]        It was a pleasure seeing Ms. Vicky Aguilera today. No follow-ups on file. SUBJECTIVE:     HPI  Ms. Vicky Aguilera is a 78 y.o. female established patient who presents for the following concerns:    1. Moderate episode of recurrent major depressive disorder ([address])  Overview:  See anxiety overview. 2. Anxiety  Overview:  Medication: buspar 10mg BID, propranolol ER 60mg daily. Prev meds: SSRI, came off due to ?low bp? Therapy: none    Interval HX: noticed improvements with increasing dosage of buspar last visit from 5 to 10mg BID.  Has also had

## 2023-10-05 ASSESSMENT — VISUAL ACUITY: OU: 1

## 2023-10-06 ENCOUNTER — OFFICE VISIT (OUTPATIENT)
Age: 68
End: 2023-10-06
Payer: MEDICARE

## 2023-10-06 VITALS
HEART RATE: 60 BPM | DIASTOLIC BLOOD PRESSURE: 78 MMHG | OXYGEN SATURATION: 98 % | WEIGHT: 163.8 LBS | BODY MASS INDEX: 27.29 KG/M2 | SYSTOLIC BLOOD PRESSURE: 112 MMHG | HEIGHT: 65 IN

## 2023-10-06 DIAGNOSIS — R94.39 ABNORMAL NUCLEAR STRESS TEST: ICD-10-CM

## 2023-10-06 DIAGNOSIS — R55 SYNCOPE AND COLLAPSE: ICD-10-CM

## 2023-10-06 DIAGNOSIS — E78.00 HYPERCHOLESTEROLEMIA: ICD-10-CM

## 2023-10-06 DIAGNOSIS — G47.33 OBSTRUCTIVE SLEEP APNEA SYNDROME: ICD-10-CM

## 2023-10-06 DIAGNOSIS — I10 PRIMARY HYPERTENSION: Primary | ICD-10-CM

## 2023-10-06 LAB — ECHO BSA: 1.85 M2

## 2023-10-06 PROCEDURE — 99213 OFFICE O/P EST LOW 20 MIN: CPT | Performed by: NURSE PRACTITIONER

## 2023-10-06 PROCEDURE — 1090F PRES/ABSN URINE INCON ASSESS: CPT | Performed by: NURSE PRACTITIONER

## 2023-10-06 PROCEDURE — G8484 FLU IMMUNIZE NO ADMIN: HCPCS | Performed by: NURSE PRACTITIONER

## 2023-10-06 PROCEDURE — G8399 PT W/DXA RESULTS DOCUMENT: HCPCS | Performed by: NURSE PRACTITIONER

## 2023-10-06 PROCEDURE — G8427 DOCREV CUR MEDS BY ELIG CLIN: HCPCS | Performed by: NURSE PRACTITIONER

## 2023-10-06 PROCEDURE — 1036F TOBACCO NON-USER: CPT | Performed by: NURSE PRACTITIONER

## 2023-10-06 PROCEDURE — 3078F DIAST BP <80 MM HG: CPT | Performed by: NURSE PRACTITIONER

## 2023-10-06 PROCEDURE — 3017F COLORECTAL CA SCREEN DOC REV: CPT | Performed by: NURSE PRACTITIONER

## 2023-10-06 PROCEDURE — G8419 CALC BMI OUT NRM PARAM NOF/U: HCPCS | Performed by: NURSE PRACTITIONER

## 2023-10-06 PROCEDURE — 3074F SYST BP LT 130 MM HG: CPT | Performed by: NURSE PRACTITIONER

## 2023-10-06 PROCEDURE — 1123F ACP DISCUSS/DSCN MKR DOCD: CPT | Performed by: NURSE PRACTITIONER

## 2023-10-17 ENCOUNTER — OFFICE VISIT (OUTPATIENT)
Age: 68
End: 2023-10-17
Payer: MEDICARE

## 2023-10-17 VITALS
WEIGHT: 165.1 LBS | HEIGHT: 65 IN | HEART RATE: 62 BPM | BODY MASS INDEX: 27.51 KG/M2 | TEMPERATURE: 98 F | OXYGEN SATURATION: 93 % | SYSTOLIC BLOOD PRESSURE: 123 MMHG | DIASTOLIC BLOOD PRESSURE: 88 MMHG

## 2023-10-17 DIAGNOSIS — I25.119 ATHEROSCLEROSIS OF NATIVE CORONARY ARTERY OF NATIVE HEART WITH ANGINA PECTORIS (HCC): ICD-10-CM

## 2023-10-17 DIAGNOSIS — G47.33 OBSTRUCTIVE SLEEP APNEA (ADULT) (PEDIATRIC): Primary | ICD-10-CM

## 2023-10-17 DIAGNOSIS — I10 ESSENTIAL HYPERTENSION: ICD-10-CM

## 2023-10-17 PROCEDURE — G8427 DOCREV CUR MEDS BY ELIG CLIN: HCPCS | Performed by: INTERNAL MEDICINE

## 2023-10-17 PROCEDURE — 3074F SYST BP LT 130 MM HG: CPT | Performed by: INTERNAL MEDICINE

## 2023-10-17 PROCEDURE — G8399 PT W/DXA RESULTS DOCUMENT: HCPCS | Performed by: INTERNAL MEDICINE

## 2023-10-17 PROCEDURE — 1123F ACP DISCUSS/DSCN MKR DOCD: CPT | Performed by: INTERNAL MEDICINE

## 2023-10-17 PROCEDURE — 3079F DIAST BP 80-89 MM HG: CPT | Performed by: INTERNAL MEDICINE

## 2023-10-17 PROCEDURE — G8484 FLU IMMUNIZE NO ADMIN: HCPCS | Performed by: INTERNAL MEDICINE

## 2023-10-17 PROCEDURE — 3017F COLORECTAL CA SCREEN DOC REV: CPT | Performed by: INTERNAL MEDICINE

## 2023-10-17 PROCEDURE — G8419 CALC BMI OUT NRM PARAM NOF/U: HCPCS | Performed by: INTERNAL MEDICINE

## 2023-10-17 PROCEDURE — 1090F PRES/ABSN URINE INCON ASSESS: CPT | Performed by: INTERNAL MEDICINE

## 2023-10-17 PROCEDURE — 1036F TOBACCO NON-USER: CPT | Performed by: INTERNAL MEDICINE

## 2023-10-17 PROCEDURE — 99213 OFFICE O/P EST LOW 20 MIN: CPT | Performed by: INTERNAL MEDICINE

## 2023-10-17 ASSESSMENT — SLEEP AND FATIGUE QUESTIONNAIRES
HOW LIKELY ARE YOU TO NOD OFF OR FALL ASLEEP WHILE SITTING INACTIVE IN A PUBLIC PLACE: 0
HOW LIKELY ARE YOU TO NOD OFF OR FALL ASLEEP WHILE SITTING AND READING: 1
ESS TOTAL SCORE: 3
HOW LIKELY ARE YOU TO NOD OFF OR FALL ASLEEP WHILE LYING DOWN TO REST IN THE AFTERNOON WHEN CIRCUMSTANCES PERMIT: 1
HOW LIKELY ARE YOU TO NOD OFF OR FALL ASLEEP WHEN YOU ARE A PASSENGER IN A CAR FOR AN HOUR WITHOUT A BREAK: 0
HOW LIKELY ARE YOU TO NOD OFF OR FALL ASLEEP IN A CAR, WHILE STOPPED FOR A FEW MINUTES IN TRAFFIC: 0
HOW LIKELY ARE YOU TO NOD OFF OR FALL ASLEEP WHILE SITTING QUIETLY AFTER LUNCH WITHOUT ALCOHOL: 0
HOW LIKELY ARE YOU TO NOD OFF OR FALL ASLEEP WHILE SITTING AND TALKING TO SOMEONE: 0
HOW LIKELY ARE YOU TO NOD OFF OR FALL ASLEEP WHILE WATCHING TV: 1

## 2023-10-17 NOTE — PATIENT INSTRUCTIONS
[address] [facility] Gretta Haji, [address] [place]  Tel.  [phone]  Fax. [address] [facility], [address] [facility] [place] [facility]  Tel.  [phone]  Fax. [phone] [facility], [address] [facility]  Tel.  [phone]  Fax. [phone]     PROPER SLEEP HYGIENE    What to avoid  Do not have drinks with caffeine, such as coffee or black tea, for 8 hours before bed. Do not smoke or use other types of tobacco near bedtime. Nicotine is a stimulant and can keep you awake. Avoid drinking alcohol late in the evening, because it can cause you to wake in the middle of the night. Do not eat a big meal close to bedtime. If you are hungry, eat a light snack. Do not drink a lot of water close to bedtime, because the need to urinate may wake you up during the night. Do not read or watch TV in bed. Use the bed only for sleeping and sexual activity. What to try  Go to bed at the same time every night, and wake up at the same time every morning. Do not take naps during the day. Keep your bedroom quiet, dark, and cool. Get regular exercise, but not within 3 to 4 hours of your bedtime. .  Sleep on a comfortable pillow and mattress. If watching the clock makes you anxious, turn it facing away from you so you cannot see the time. If you worry when you lie down, start a worry book. Well before bedtime, write down your worries, and then set the book and your concerns aside. Try meditation or other relaxation techniques before you go to bed. If you cannot fall asleep, get up and go to another room until you feel sleepy. Do something relaxing. Repeat your bedtime routine before you go to bed again. Make your house quiet and calm about an hour before bedtime. Turn down the lights, turn off the TV, log off the computer, and turn down the volume on music. This can help you relax after a busy day.     Drowsy Driving  The [address] [place] [address] Traffic Safety Administration cites drowsiness as a

## 2023-10-17 NOTE — PROGRESS NOTES
[address] [facility] Gretta Haji, [address] [place]  Tel.  [phone]  Fax. [address] [facility], [address] [place] [address]  Tel.  [phone]  Fax. [phone] [facility], [address] [facility]  Tel.  [phone]  Fax. [phone]     S>Kalina Ocampo is a 78 y.o. female seen for a positive airway pressure follow-up. She reports no problems using the device. The following problems are identified:    Drowsiness no Problems exhaling no   Snoring no Forget to put on no   Mask Comfortable yes Can't fall asleep no   Dry Mouth no Mask falls off no   Air Leaking no Frequent awakenings no     Estimated AHI flow from download shows 0.6/hour. no significant leak  Averaging 7/hours use on nights used  Days with usage 97%  Adherence 97%  Weight from last visit 158 lb       She admits that her sleep has improved. Allergies   Allergen Reactions    Venlafaxine Other (See Comments)     jittery       She has a current medication list which includes the following prescription(s): atomoxetine, atorvastatin, aspirin, nitroglycerin, buspirone, propranolol, cyanocobalamin, calcium carb-cholecalciferol, denosumab, and ibuprofen. .      She  has a past medical history of Allergic conjunctivitis of both eyes, Brain tumor ([address]), Cognitive decline, Depression, Essential hypertension, Hot flashes, Hot flashes, Hypercholesterolemia, Obstructive sleep apnea, Osteoarthritis of right knee, Osteoarthritis of right knee, and Pap smear for cervical cancer screening.         10/17/2023     2:40 PM 6/13/2023    11:04 AM   Sleep Medicine   Sitting and reading 1 1   Watching TV 1 1   Sitting, inactive in a public place (e.g. a theatre or a meeting) 0 0   As a passenger in a car for an hour without a break 0 0   Lying down to rest in the afternoon when circumstances permit 1 1   Sitting and talking to someone 0 0   Sitting quietly after a lunch without alcohol 0 0   In a car, while stopped for a few

## 2023-11-08 ENCOUNTER — NURSE TRIAGE (OUTPATIENT)
Dept: OTHER | Facility: CLINIC | Age: 68
End: 2023-11-08

## 2023-11-14 ENCOUNTER — OFFICE VISIT (OUTPATIENT)
Facility: CLINIC | Age: 68
End: 2023-11-14
Payer: MEDICARE

## 2023-11-14 VITALS
WEIGHT: 162 LBS | HEIGHT: 65 IN | RESPIRATION RATE: 20 BRPM | BODY MASS INDEX: 26.99 KG/M2 | DIASTOLIC BLOOD PRESSURE: 82 MMHG | SYSTOLIC BLOOD PRESSURE: 114 MMHG | TEMPERATURE: 97.6 F | HEART RATE: 76 BPM

## 2023-11-14 DIAGNOSIS — F41.9 ANXIETY: ICD-10-CM

## 2023-11-14 DIAGNOSIS — M54.50 ACUTE LEFT-SIDED LOW BACK PAIN WITHOUT SCIATICA: Primary | ICD-10-CM

## 2023-11-14 DIAGNOSIS — F33.1 MODERATE EPISODE OF RECURRENT MAJOR DEPRESSIVE DISORDER (HCC): ICD-10-CM

## 2023-11-14 PROCEDURE — 1123F ACP DISCUSS/DSCN MKR DOCD: CPT | Performed by: FAMILY MEDICINE

## 2023-11-14 PROCEDURE — 3017F COLORECTAL CA SCREEN DOC REV: CPT | Performed by: FAMILY MEDICINE

## 2023-11-14 PROCEDURE — 99214 OFFICE O/P EST MOD 30 MIN: CPT | Performed by: FAMILY MEDICINE

## 2023-11-14 PROCEDURE — 1036F TOBACCO NON-USER: CPT | Performed by: FAMILY MEDICINE

## 2023-11-14 PROCEDURE — G8419 CALC BMI OUT NRM PARAM NOF/U: HCPCS | Performed by: FAMILY MEDICINE

## 2023-11-14 PROCEDURE — G8399 PT W/DXA RESULTS DOCUMENT: HCPCS | Performed by: FAMILY MEDICINE

## 2023-11-14 PROCEDURE — G8484 FLU IMMUNIZE NO ADMIN: HCPCS | Performed by: FAMILY MEDICINE

## 2023-11-14 PROCEDURE — 3074F SYST BP LT 130 MM HG: CPT | Performed by: FAMILY MEDICINE

## 2023-11-14 PROCEDURE — 1090F PRES/ABSN URINE INCON ASSESS: CPT | Performed by: FAMILY MEDICINE

## 2023-11-14 PROCEDURE — 3079F DIAST BP 80-89 MM HG: CPT | Performed by: FAMILY MEDICINE

## 2023-11-14 PROCEDURE — G8427 DOCREV CUR MEDS BY ELIG CLIN: HCPCS | Performed by: FAMILY MEDICINE

## 2023-11-14 RX ORDER — GABAPENTIN 300 MG/1
CAPSULE ORAL
COMMUNITY
Start: 2023-11-02

## 2023-11-14 RX ORDER — BUSPIRONE HYDROCHLORIDE 10 MG/1
10 TABLET ORAL 2 TIMES DAILY
Qty: 180 TABLET | Refills: 3 | Status: SHIPPED | OUTPATIENT
Start: 2023-11-14

## 2023-11-14 ASSESSMENT — PATIENT HEALTH QUESTIONNAIRE - PHQ9
SUM OF ALL RESPONSES TO PHQ QUESTIONS 1-9: 2
SUM OF ALL RESPONSES TO PHQ QUESTIONS 1-9: 2
1. LITTLE INTEREST OR PLEASURE IN DOING THINGS: 1
SUM OF ALL RESPONSES TO PHQ QUESTIONS 1-9: 2
2. FEELING DOWN, DEPRESSED OR HOPELESS: 1
SUM OF ALL RESPONSES TO PHQ9 QUESTIONS 1 & 2: 2
SUM OF ALL RESPONSES TO PHQ QUESTIONS 1-9: 2

## 2023-11-14 NOTE — ASSESSMENT & PLAN NOTE
MSK muscle spasming paraspinals, no bony tenderness/deformity. Strain. Recommend physical therapy and retrained on conservative cares.

## 2023-12-11 ENCOUNTER — HOSPITAL ENCOUNTER (OUTPATIENT)
Dept: PHYSICAL THERAPY | Facility: HOSPITAL | Age: 68
Setting detail: RECURRING SERIES
Discharge: HOME | End: 2023-12-14
Payer: MEDICARE

## 2023-12-11 PROCEDURE — G0283 ELEC STIM OTHER THAN WOUND: HCPCS | Performed by: PHYSICAL THERAPIST

## 2023-12-11 PROCEDURE — 97110 THERAPEUTIC EXERCISES: CPT | Performed by: PHYSICAL THERAPIST

## 2023-12-11 PROCEDURE — 97161 PT EVAL LOW COMPLEX 20 MIN: CPT | Performed by: PHYSICAL THERAPIST

## 2023-12-11 NOTE — THERAPY EVALUATION
H.S. SLR: (-)                     Objective/Functional Outcome Measure:   FOTO Score: 53  FOTO score = an established functional score where 100 = no disability      25 min [x]Eval - untimed                        Therapeutic Procedures: Tx Min Billable or 1:1 Min (if diff from Tx Min) Procedure, Rationale, Specifics   10  81914 Therapeutic Exercise (timed):  increase ROM, strength, coordination, balance, and proprioception to improve patient's ability to progress to PLOF and address remaining functional goals. (see flow sheet as applicable)    Details if applicable:     10 10    Total Total     Modalities Rationale:     decrease pain and increase tissue extensibility to improve patient's ability to progress to PLOF and address remaining functional goals. 15     min [x] Estim Unattended,             type/location: IFC       [x]  w/ice    []  w/heat          min [] Estim Attended,             type/location:       []  w/ice   []  w/heat         []  w/US   []  TENS insruct                min []  Mechanical Traction,        type/lbs:        []  pro      []  sup           []  int       []  cont            []  before manual           []  after manual     min []  Ultrasound,         settings/location:      min  unbilled []  Ice     []  Heat            location/position:             min []  Vasopneumatic Device,      press/temp:   pre-treatment girth :    post-treatment girth :    measured at (landmark       location) :    If using vaso (only need to measure limb vaso being performed on)        min []  Other:        Skin assessment post-treatment (if applicable):    [x]  intact    []  redness- no adverse reaction                 []redness - adverse reaction:          [x]  Patient Education billed concurrently with other procedures   [x] Review HEP    [] Progressed/Changed HEP, detail:    [] Other detail:       Pain Level at end of session (0-10 scale): 3    Plan of Care / Statement of Necessity for Physical

## 2023-12-27 ENCOUNTER — HOSPITAL ENCOUNTER (OUTPATIENT)
Dept: PHYSICAL THERAPY | Facility: HOSPITAL | Age: 68
Setting detail: RECURRING SERIES
Discharge: HOME | End: 2023-12-30
Payer: MEDICARE

## 2023-12-27 PROCEDURE — 97140 MANUAL THERAPY 1/> REGIONS: CPT | Performed by: PHYSICAL THERAPIST

## 2023-12-27 PROCEDURE — G0283 ELEC STIM OTHER THAN WOUND: HCPCS | Performed by: PHYSICAL THERAPIST

## 2023-12-27 PROCEDURE — 97110 THERAPEUTIC EXERCISES: CPT | Performed by: PHYSICAL THERAPIST

## 2023-12-27 NOTE — PROGRESS NOTES
PHYSICAL THERAPY - MEDICARE DAILY TREATMENT NOTE (updated 3/23)      Date: 2023          Patient Name:  Suzy Cornejo :  1955   Medical   Diagnosis:  Acute left-sided low back pain without sciatica [M54.50] Treatment Diagnosis:  M54.59  OTHER LOWER BACK PAIN    Referral Source:  Azeem Moncada MD Insurance:   Payor: MEDICARE / Plan: MEDICARE PART A AND B / Product Type: *No Product type* /                     Patient  verified yes     Visit #   Current  / Total 3 16   Time   In / Out 217p 322p   Total Treatment Time 65   Total Timed Codes 55   1:1 Treatment Time 48      [facility] Totals Reminder:  bill using total billable   min of TIMED therapeutic procedures and modalities. 8-22 min = 1 unit; 23-37 min = 2 units; 38-52 min = 3 units; 53-67 min = 4 units; 68-82 min = 5 units        SUBJECTIVE    Pain Level (0-10 scale): 5/10    Any medication changes, allergies to medications, adverse drug reactions, diagnosis change, or new procedure performed?: [x] No    [] Yes (see summary sheet for update)  Medications: Verified on Patient Summary List    Subjective functional status/changes:     Reports more back pain since last visit. Says that it has worsened since she started doing more. OBJECTIVE    Therapeutic Procedures: Tx Min Billable or 1:1 Min (if diff from Tx Min) Procedure, Rationale, Specifics   81 [id number] Manual Therapy (timed):  decrease pain, increase ROM, and increase tissue extensibility to improve patient's ability to progress to PLOF and address remaining functional goals. The manual therapy interventions were performed at a separate and distinct time from the therapeutic activities interventions .  (see flow sheet as applicable)     Details if applicable:  STM right paraspinal and QL muscles and stretching   40 [id number] Therapeutic Exercise (timed):  increase ROM, strength, coordination, balance, and proprioception to improve patient's ability to progress to PLOF and address

## 2023-12-29 DIAGNOSIS — M54.40 ACUTE LEFT-SIDED LOW BACK PAIN WITH SCIATICA, SCIATICA LATERALITY UNSPECIFIED: Primary | ICD-10-CM

## 2023-12-29 NOTE — TELEPHONE ENCOUNTER
90 day refill request for Gabapentin 300 mg tablets taking 1 capsule twice daily faxed by [facility]. Stacy

## 2024-01-02 RX ORDER — GABAPENTIN 300 MG/1
300 CAPSULE ORAL 2 TIMES DAILY
Qty: 180 CAPSULE | Refills: 0 | Status: SHIPPED | OUTPATIENT
Start: 2024-01-02 | End: 2024-12-27

## 2024-01-02 RX ORDER — GABAPENTIN 300 MG/1
CAPSULE ORAL
Qty: 60 CAPSULE | Refills: 1 | Status: CANCELLED | OUTPATIENT
Start: 2024-01-02

## 2024-01-03 ENCOUNTER — HOSPITAL ENCOUNTER (OUTPATIENT)
Dept: PHYSICAL THERAPY | Facility: HOSPITAL | Age: 69
Setting detail: RECURRING SERIES
Discharge: HOME | End: 2024-01-06
Payer: MEDICARE

## 2024-01-03 PROCEDURE — 97110 THERAPEUTIC EXERCISES: CPT | Performed by: PHYSICAL THERAPIST

## 2024-01-03 PROCEDURE — G0283 ELEC STIM OTHER THAN WOUND: HCPCS | Performed by: PHYSICAL THERAPIST

## 2024-01-03 PROCEDURE — 97140 MANUAL THERAPY 1/> REGIONS: CPT | Performed by: PHYSICAL THERAPIST

## 2024-01-08 ENCOUNTER — HOSPITAL ENCOUNTER (OUTPATIENT)
Dept: PHYSICAL THERAPY | Facility: HOSPITAL | Age: 69
Setting detail: RECURRING SERIES
Discharge: HOME | End: 2024-01-11
Payer: MEDICARE

## 2024-01-08 PROCEDURE — 97140 MANUAL THERAPY 1/> REGIONS: CPT | Performed by: PHYSICAL THERAPIST

## 2024-01-08 PROCEDURE — 97110 THERAPEUTIC EXERCISES: CPT | Performed by: PHYSICAL THERAPIST

## 2024-01-08 PROCEDURE — G0283 ELEC STIM OTHER THAN WOUND: HCPCS | Performed by: PHYSICAL THERAPIST

## 2024-01-08 NOTE — PROGRESS NOTES
PHYSICAL THERAPY - MEDICARE DAILY TREATMENT NOTE (updated 3/23)      Date: 2024          Patient Name:  Klaina Shah :  1955   Medical   Diagnosis:  Acute left-sided low back pain without sciatica [M54.50] Treatment Diagnosis:  M54.59  OTHER LOWER BACK PAIN    Referral Source:  Josué Goodrich MD Insurance:   Payor: MEDICARE / Plan: MEDICARE PART A AND B / Product Type: *No Product type* /                     Patient  verified yes     Visit #   Current  / Total 5 16   Time   In / Out 1059a 1200p   Total Treatment Time 61   Total Timed Codes 46   1:1 Treatment Time 41      [facility] Totals Reminder:  bill using total billable   min of TIMED therapeutic procedures and modalities.   8-22 min = 1 unit; 23-37 min = 2 units; 38-52 min = 3 units; 53-67 min = 4 units; 68-82 min = 5 units        SUBJECTIVE    Pain Level (0-10 scale): 2/10    Any medication changes, allergies to medications, adverse drug reactions, diagnosis change, or new procedure performed?: [x] No    [] Yes (see summary sheet for update)  Medications: Verified on Patient Summary List    Subjective functional status/changes:     Doing pretty well today. Says that she isn't having too much pain unless she is too active.     OBJECTIVE    Therapeutic Procedures:  Tx Min Billable or 1:1 Min (if diff from Tx Min) Procedure, Rationale, Specifics   10  91710 Manual Therapy (timed):  decrease pain, increase ROM, and increase tissue extensibility to improve patient's ability to progress to PLOF and address remaining functional goals.  The manual therapy interventions were performed at a separate and distinct time from the therapeutic activities interventions . (see flow sheet as applicable)     Details if applicable:  STM right paraspinal and QL muscles and stretching, sidelying gapping and light rotational stretching   36 35 82100 Therapeutic Exercise (timed):  increase ROM, strength, coordination, balance, and proprioception to improve

## 2024-01-15 ENCOUNTER — HOSPITAL ENCOUNTER (OUTPATIENT)
Dept: PHYSICAL THERAPY | Facility: HOSPITAL | Age: 69
Setting detail: RECURRING SERIES
Discharge: HOME | End: 2024-01-18
Payer: MEDICARE

## 2024-01-15 PROCEDURE — G0283 ELEC STIM OTHER THAN WOUND: HCPCS | Performed by: PHYSICAL THERAPIST

## 2024-01-15 PROCEDURE — 97110 THERAPEUTIC EXERCISES: CPT | Performed by: PHYSICAL THERAPIST

## 2024-01-15 PROCEDURE — 97140 MANUAL THERAPY 1/> REGIONS: CPT | Performed by: PHYSICAL THERAPIST

## 2024-01-15 NOTE — PROGRESS NOTES
Physical Therapy at [facility],   a part of [address], [address]  [place] [address]  Phone: [phone]  Fax: [phone]      PHYSICAL THERAPY PROGRESS NOTE  Patient Name:  Kalina Shah :  1955   Treatment/Medical Diagnosis: Acute left-sided low back pain without sciatica [M54.50]   Referral Source:  Josué Goodrich MD     Date of Initial Visit:  23 Attended Visits:  6 Missed Visits:  0     SUMMARY OF TREATMENT/ASSESSMENT:  Ms. Shah is reporting some reduced pain levels since beginning care on 23. She is still limiting with pain-free activity tolerance, and we will continue to work on improving her back strength and activity tolerance with our remaining sessions.     CURRENT STATUS  Long Term Goals: To be accomplished in 16 treatments.  Patient will be able to perform light household chores without increase in back pain STILL PROBLEMATIC  Pt will be able to sit for at least 30 minutes without increase in symptoms MET  Pt will be able to perform light yard work without increase in back pain NOT MET  Pt will be able to self-manage care using updated HEP for improved independence    RECOMMENDATIONS  Would continue to benefit from further PT in order to promote improved back strength and reduced pain levels with activity.         WILLIE BAH, PT       1/15/2024       11:01 AM    If you have any questions/comments please contact us directly at [phone].   Thank you for allowing us to assist in the care of your patient.

## 2024-01-15 NOTE — PROGRESS NOTES
PHYSICAL THERAPY - MEDICARE DAILY TREATMENT NOTE (updated 3/23)      Date: 1/15/2024          Patient Name:  Kalina Shah :  1955   Medical   Diagnosis:  Acute left-sided low back pain without sciatica [M54.50] Treatment Diagnosis:  M54.59  OTHER LOWER BACK PAIN    Referral Source:  Josué Goodrich MD Insurance:   Payor: MEDICARE / Plan: MEDICARE PART A AND B / Product Type: *No Product type* /                     Patient  verified yes     Visit #   Current  / Total 6 16   Time   In / Out 1053a 1203p   Total Treatment Time 70   Total Timed Codes 55   1:1 Treatment Time 50      [facility] Totals Reminder:  bill using total billable   min of TIMED therapeutic procedures and modalities.   8-22 min = 1 unit; 23-37 min = 2 units; 38-52 min = 3 units; 53-67 min = 4 units; 68-82 min = 5 units        SUBJECTIVE    Pain Level (0-10 scale): 3/10    Any medication changes, allergies to medications, adverse drug reactions, diagnosis change, or new procedure performed?: [x] No    [] Yes (see summary sheet for update)  Medications: Verified on Patient Summary List    Subjective functional status/changes:     Overall pain feeling less pain since starting PT.     OBJECTIVE    Therapeutic Procedures:  Tx Min Billable or 1:1 Min (if diff from Tx Min) Procedure, Rationale, Specifics   10  [id number] Manual Therapy (timed):  decrease pain, increase ROM, and increase tissue extensibility to improve patient's ability to progress to PLOF and address remaining functional goals.  The manual therapy interventions were performed at a separate and distinct time from the therapeutic activities interventions . (see flow sheet as applicable)     Details if applicable:  STM right paraspinal and QL muscles and stretching, sidelying gapping and light rotational stretching   45 40 [id number] Therapeutic Exercise (timed):  increase ROM, strength, coordination, balance, and proprioception to improve patient's ability to progress to PLOF and

## 2024-01-19 RX ORDER — ATOMOXETINE 40 MG/1
40 CAPSULE ORAL DAILY
Qty: 30 CAPSULE | Refills: 3 | Status: SHIPPED | OUTPATIENT
Start: 2024-01-19

## 2024-01-22 ENCOUNTER — HOSPITAL ENCOUNTER (OUTPATIENT)
Dept: PHYSICAL THERAPY | Facility: HOSPITAL | Age: 69
Setting detail: RECURRING SERIES
Discharge: HOME | End: 2024-01-25
Payer: MEDICARE

## 2024-01-22 PROCEDURE — 97110 THERAPEUTIC EXERCISES: CPT | Performed by: PHYSICAL THERAPIST

## 2024-01-22 PROCEDURE — G0283 ELEC STIM OTHER THAN WOUND: HCPCS | Performed by: PHYSICAL THERAPIST

## 2024-01-22 PROCEDURE — 97140 MANUAL THERAPY 1/> REGIONS: CPT | Performed by: PHYSICAL THERAPIST

## 2024-01-22 NOTE — PROGRESS NOTES
patterns, and analyze and modify for postural abnormalities to address functional deficits and attain remaining goals.    Progress toward goals / Updated goals:  [x]  See Progress Note/Recertification   Long Term Goals: To be accomplished in 16 treatments.  Patient will be able to perform light household chores without increase in back pain STILL PROBLEMATIC  Pt will be able to sit for at least 30 minutes without increase in symptoms MET  Pt will be able to perform light yard work without increase in back pain NOT MET  Pt will be able to self-manage care using updated HEP for improved independence    PLAN  Yes  Continue plan of care  Re-Cert Due: 3/11/2024  [x]  Upgrade activities as tolerated  []  Discharge due to :  []  Other:      WILLIE BAH, PT       1/22/2024       11:01 AM

## 2024-01-29 ENCOUNTER — HOSPITAL ENCOUNTER (OUTPATIENT)
Dept: PHYSICAL THERAPY | Facility: HOSPITAL | Age: 69
Setting detail: RECURRING SERIES
Discharge: HOME | End: 2024-02-01
Payer: MEDICARE

## 2024-01-29 PROCEDURE — G0283 ELEC STIM OTHER THAN WOUND: HCPCS | Performed by: PHYSICAL THERAPIST

## 2024-01-29 PROCEDURE — 97110 THERAPEUTIC EXERCISES: CPT | Performed by: PHYSICAL THERAPIST

## 2024-01-29 PROCEDURE — 97140 MANUAL THERAPY 1/> REGIONS: CPT | Performed by: PHYSICAL THERAPIST

## 2024-01-29 NOTE — PROGRESS NOTES
PHYSICAL THERAPY - MEDICARE DAILY TREATMENT NOTE (updated 3/23)      Date: 2024          Patient Name:  Kalina Shah :  1955   Medical   Diagnosis:  Acute left-sided low back pain without sciatica [M54.50] Treatment Diagnosis:  M54.59  OTHER LOWER BACK PAIN    Referral Source:  Josué Goodrich MD Insurance:   Payor: MEDICARE / Plan: MEDICARE PART A AND B / Product Type: *No Product type* /                     Patient  verified yes     Visit #   Current  / Total 8 16   Time   In / Out 1143a 1250p   Total Treatment Time 67   Total Timed Codes 52   1:1 Treatment Time 47      [facility] Totals Reminder:  bill using total billable   min of TIMED therapeutic procedures and modalities.   8-22 min = 1 unit; 23-37 min = 2 units; 38-52 min = 3 units; 53-67 min = 4 units; 68-82 min = 5 units        SUBJECTIVE    Pain Level (0-10 scale): 2/10    Any medication changes, allergies to medications, adverse drug reactions, diagnosis change, or new procedure performed?: [x] No    [] Yes (see summary sheet for update)  Medications: Verified on Patient Summary List    Subjective functional status/changes:     Feeling pretty good. L side of the back is bothering her more today.     OBJECTIVE    Therapeutic Procedures:  Tx Min Billable or 1:1 Min (if diff from Tx Min) Procedure, Rationale, Specifics   10  11292 Manual Therapy (timed):  decrease pain, increase ROM, and increase tissue extensibility to improve patient's ability to progress to PLOF and address remaining functional goals.  The manual therapy interventions were performed at a separate and distinct time from the therapeutic activities interventions . (see flow sheet as applicable)     Details if applicable:  STM right paraspinal and QL muscles and stretching, sidelying gapping and light rotational stretching   42 37 [id number] Therapeutic Exercise (timed):  increase ROM, strength, coordination, balance, and proprioception to improve patient's ability to progress

## 2024-02-05 ENCOUNTER — HOSPITAL ENCOUNTER (OUTPATIENT)
Dept: PHYSICAL THERAPY | Facility: HOSPITAL | Age: 69
Setting detail: RECURRING SERIES
Discharge: HOME | End: 2024-02-08
Payer: MEDICARE

## 2024-02-05 PROCEDURE — 97140 MANUAL THERAPY 1/> REGIONS: CPT | Performed by: PHYSICAL THERAPIST

## 2024-02-05 PROCEDURE — G0283 ELEC STIM OTHER THAN WOUND: HCPCS | Performed by: PHYSICAL THERAPIST

## 2024-02-05 PROCEDURE — 97110 THERAPEUTIC EXERCISES: CPT | Performed by: PHYSICAL THERAPIST

## 2024-02-05 NOTE — PROGRESS NOTES
PHYSICAL THERAPY - MEDICARE DAILY TREATMENT NOTE (updated 3/23)    Date: 2024        Patient Name:  Kalina Shah :  1955   Medical   Diagnosis:  Acute left-sided low back pain without sciatica [M54.50] Treatment Diagnosis:  M54.59  OTHER LOWER BACK PAIN    Referral Source:  Josué Goodrich MD Insurance:   Payor: MEDICARE / Plan: MEDICARE PART A AND B / Product Type: *No Product type* /                     Patient  verified yes     Visit #   Current  / Total 9 16   Time   In / Out 1052a 1157a   Total Treatment Time 65   Total Timed Codes 50   1:1 Treatment Time 45      [facility] Totals Reminder:  bill using total billable   min of TIMED therapeutic procedures and modalities.   8-22 min = 1 unit; 23-37 min = 2 units; 38-52 min = 3 units; 53-67 min = 4 units; 68-82 min = 5 units        SUBJECTIVE    Pain Level (0-10 scale): 6/10    Any medication changes, allergies to medications, adverse drug reactions, diagnosis change, or new procedure performed?: [x] No    [] Yes (see summary sheet for update)  Medications: Verified on Patient Summary List    Subjective functional status/changes:     L side of the back has been particularly pain since last Friday. Not sure why. She did clean her steps, but wasn't too much work.     OBJECTIVE    Therapeutic Procedures:  Tx Min Billable or 1:1 Min (if diff from Tx Min) Procedure, Rationale, Specifics   30  79950 Manual Therapy (timed):  decrease pain, increase ROM, and increase tissue extensibility to improve patient's ability to progress to PLOF and address remaining functional goals.  The manual therapy interventions were performed at a separate and distinct time from the therapeutic activities interventions . (see flow sheet as applicable)     Details if applicable:  STM right paraspinal and QL muscles and stretching, sidelying gapping and light rotational stretching   20 15 36760 Therapeutic Exercise (timed):  increase ROM, strength, coordination, balance, and

## 2024-02-08 ENCOUNTER — TELEPHONE (OUTPATIENT)
Age: 69
End: 2024-02-08

## 2024-02-08 DIAGNOSIS — E78.00 HYPERCHOLESTEROLEMIA: ICD-10-CM

## 2024-02-08 DIAGNOSIS — R07.9 CHEST PAIN WITH HIGH RISK FOR CARDIAC ETIOLOGY: Primary | ICD-10-CM

## 2024-02-08 RX ORDER — ASPIRIN 81 MG/1
81 TABLET ORAL DAILY
Qty: 90 TABLET | Refills: 1 | Status: SHIPPED | OUTPATIENT
Start: 2024-02-08

## 2024-02-08 RX ORDER — ATORVASTATIN CALCIUM 40 MG/1
40 TABLET, FILM COATED ORAL DAILY
Qty: 90 TABLET | Refills: 1 | Status: SHIPPED | OUTPATIENT
Start: 2024-02-08

## 2024-02-08 NOTE — TELEPHONE ENCOUNTER
Requested Prescriptions     Signed Prescriptions Disp Refills    aspirin 81 MG EC tablet 90 tablet 1     Sig: Take 1 tablet by mouth daily     Authorizing Provider: NAHID AVELAR     Ordering User: MELA DOBSON    atorvastatin (LIPITOR) 40 MG tablet 90 tablet 1     Sig: Take 1 tablet by mouth daily     Authorizing Provider: NAHID AVELAR     Ordering User: MELA DOBSON      Future Appointments   Date Time Provider Department [place]   2/12/2024 11:00 AM Ayad Jack, PT [facility]   2/14/2024 10:30 AM Josué Goodrich MD CCFP BS [facility]   3/4/2024 12:30 PM CAMILLE SILVANA US1 BSROBG BS [facility]   3/4/2024  1:00 PM Rizwana Machado MD BSROBG [facility]   3/5/2024 10:40 AM Ameya Bynum MD NEUROWTCRSPB [facility]   3/11/2024  1:00 PM [facility]FUSION NURSE 2 Health system   3/13/2024 12:40 PM CAMILLE SILVANA MAMMO1 BSROBG BS [facility]   3/13/2024  1:00 PM Rizwana Machado MD BSROBG BS [facility]   4/5/2024  9:20 AM Nahid Avelar APRN - NP [facility]   10/18/2024 11:00 AM Desi Zavala APRN - NP [facility]      Per Verbal Order by MD/NP

## 2024-02-08 NOTE — TELEPHONE ENCOUNTER
Refill needed:    atorvastatin (LIPITOR) 40 MG tablet      aspirin 81 MG EC tablet      Thanks

## 2024-02-12 ENCOUNTER — HOSPITAL ENCOUNTER (OUTPATIENT)
Dept: PHYSICAL THERAPY | Facility: HOSPITAL | Age: 69
Setting detail: RECURRING SERIES
Discharge: HOME | End: 2024-02-15
Payer: MEDICARE

## 2024-02-12 PROCEDURE — 97110 THERAPEUTIC EXERCISES: CPT | Performed by: PHYSICAL THERAPIST

## 2024-02-12 PROCEDURE — 97140 MANUAL THERAPY 1/> REGIONS: CPT | Performed by: PHYSICAL THERAPIST

## 2024-02-12 PROCEDURE — G0283 ELEC STIM OTHER THAN WOUND: HCPCS | Performed by: PHYSICAL THERAPIST

## 2024-02-12 NOTE — PROGRESS NOTES
PHYSICAL THERAPY - MEDICARE DAILY TREATMENT NOTE (updated 3/23)    Date: 2024        Patient Name:  Kalina Shah :  1955   Medical   Diagnosis:  Acute left-sided low back pain without sciatica [M54.50] Treatment Diagnosis:  M54.59  OTHER LOWER BACK PAIN    Referral Source:  Josué Goodrich MD Insurance:   Payor: MEDICARE / Plan: MEDICARE PART A AND B / Product Type: *No Product type* /                     Patient  verified yes     Visit #   Current  / Total 10 16   Time   In / Out 1056a 1155a   Total Treatment Time 59   Total Timed Codes 44   1:1 Treatment Time 39      [facility] Totals Reminder:  bill using total billable   min of TIMED therapeutic procedures and modalities.   8-22 min = 1 unit; 23-37 min = 2 units; 38-52 min = 3 units; 53-67 min = 4 units; 68-82 min = 5 units        SUBJECTIVE    Pain Level (0-10 scale): 5/10    Any medication changes, allergies to medications, adverse drug reactions, diagnosis change, or new procedure performed?: [x] No    [] Yes (see summary sheet for update)  Medications: Verified on Patient Summary List    Subjective functional status/changes:     Had a rough weekend with the back. Didn't really do too much either. Overall, not feeling like the back is getting any better right now.     OBJECTIVE    Therapeutic Procedures:  Tx Min Billable or 1:1 Min (if diff from Tx Min) Procedure, Rationale, Specifics   02 38519 Manual Therapy (timed):  decrease pain, increase ROM, and increase tissue extensibility to improve patient's ability to progress to PLOF and address remaining functional goals.  The manual therapy interventions were performed at a separate and distinct time from the therapeutic activities interventions . (see flow sheet as applicable)     Details if applicable:  STM right paraspinal and QL muscles and stretching, sidelying gapping and light rotational stretching   29 24 24598 Therapeutic Exercise (timed):  increase ROM, strength, coordination,

## 2024-02-12 NOTE — PROGRESS NOTES
Physical Therapy at [facility],   a part of [address], [address]  [place] [address]  Phone: [phone]  Fax: [phone]      PHYSICAL THERAPY PROGRESS NOTE  Patient Name:  Kalina Shah :  1955   Treatment/Medical Diagnosis: Acute left-sided low back pain without sciatica [M54.50]   Referral Source:  Josué Goodrich MD     Date of Initial Visit:  23 Attended Visits:  10 Missed Visits:  0     SUMMARY OF TREATMENT/ASSESSMENT:  Ms. Shah has been seen for 10 sessions regarding her chronic low back pain. She was initially making some progress, however the L side of her back has flared up in the last few weeks, and overall her progress has been limited. We can get pain relief during sessions, however this does not last for her. We discussed the possibility of other treatment options including Ortho and pain management, as PT is not very effective for her at this time.     CURRENT STATUS  Long Term Goals: To be accomplished in 16 treatments.  Patient will be able to perform light household chores without increase in back pain STILL PROBLEMATIC  Pt will be able to sit for at least 30 minutes without increase in symptoms MET  Pt will be able to perform light yard work without increase in back pain NOT MET  Pt will be able to self-manage care using updated HEP for improved independence PROGRESSING      RECOMMENDATIONS  Hold further PT at this time secondary to limited progress. May benefit from Ortho or pain management consult      WILLIE BAH, PT       2024       11:24 AM    If you have any questions/comments please contact us directly at [phone].   Thank you for allowing us to assist in the care of your patient.

## 2024-02-14 ENCOUNTER — OFFICE VISIT (OUTPATIENT)
Facility: CLINIC | Age: 69
End: 2024-02-14

## 2024-02-14 VITALS
OXYGEN SATURATION: 92 % | WEIGHT: 168 LBS | SYSTOLIC BLOOD PRESSURE: 143 MMHG | TEMPERATURE: 97.1 F | HEART RATE: 73 BPM | HEIGHT: 65 IN | DIASTOLIC BLOOD PRESSURE: 94 MMHG | RESPIRATION RATE: 20 BRPM | BODY MASS INDEX: 27.99 KG/M2

## 2024-02-14 DIAGNOSIS — R06.2 WHEEZING: ICD-10-CM

## 2024-02-14 DIAGNOSIS — I10 PRIMARY HYPERTENSION: ICD-10-CM

## 2024-02-14 DIAGNOSIS — R22.1 NECK SWELLING: ICD-10-CM

## 2024-02-14 DIAGNOSIS — Z00.00 MEDICARE ANNUAL WELLNESS VISIT, SUBSEQUENT: Primary | ICD-10-CM

## 2024-02-14 DIAGNOSIS — G89.29 CHRONIC BILATERAL LOW BACK PAIN WITHOUT SCIATICA: ICD-10-CM

## 2024-02-14 DIAGNOSIS — F41.9 ANXIETY: ICD-10-CM

## 2024-02-14 DIAGNOSIS — M54.50 CHRONIC BILATERAL LOW BACK PAIN WITHOUT SCIATICA: ICD-10-CM

## 2024-02-14 LAB
T4 FREE SERPL-MCNC: 0.7 NG/DL (ref 0.8–1.5)
TSH SERPL DL<=0.05 MIU/L-ACNC: 2.4 UIU/ML (ref 0.36–3.74)

## 2024-02-14 RX ORDER — SERTRALINE HYDROCHLORIDE 25 MG/1
25 TABLET, FILM COATED ORAL DAILY
Qty: 90 TABLET | Refills: 3 | Status: SHIPPED | OUTPATIENT
Start: 2024-02-14

## 2024-02-14 RX ORDER — ALBUTEROL SULFATE 90 UG/1
2 AEROSOL, METERED RESPIRATORY (INHALATION) 4 TIMES DAILY PRN
Qty: 18 G | Refills: 0 | Status: SHIPPED | OUTPATIENT
Start: 2024-02-14

## 2024-02-14 RX ORDER — CETIRIZINE HYDROCHLORIDE 10 MG/1
10 TABLET ORAL DAILY
Qty: 30 TABLET | Refills: 0 | Status: SHIPPED | OUTPATIENT
Start: 2024-02-14 | End: 2024-03-15

## 2024-02-14 NOTE — ASSESSMENT & PLAN NOTE
Uncontrolled, changes made today: stop strattera. Start zoloft first, then stop straterra thereafter 1 week later.

## 2024-02-14 NOTE — ASSESSMENT & PLAN NOTE
Uncontrolled, changes made today: repeat back xrays to make sure healing fractures, no new compression fractures.

## 2024-02-14 NOTE — ASSESSMENT & PLAN NOTE
Uncontrolled, get chest xray for intrapulmonary process rule out given hx of dnyamic heart issues. Consider trialing albuterol though no definite wheezing today. Also consider pulmonary function testing.

## 2024-02-14 NOTE — ASSESSMENT & PLAN NOTE
Unclear control, consider trialing stopping strattera for a week to see how it that affects things. Also has hx of allergies so would recommend restarting zyrtec.

## 2024-02-14 NOTE — PROGRESS NOTES
Medicare Annual Wellness Visit    Kalina Shah is here for Medicare AWV (3 mo fu and needs awv. Per  daughter has notice facial edema maybe secondary to medication.  Some wheezing noted.  Gen health is not great is in a lot of pain whihch make it hard to work out and has had recent weight gain /PT SATS are 92%), Follow-up, Back Pain, and Depression (/)    Assessment & Plan   Medicare annual wellness visit, subsequent  Chronic bilateral low back pain without sciatica  Assessment & Plan:   Uncontrolled, changes made today: repeat back xrays to make sure healing fractures, no new compression fractures.  Plan to get in with spine team.  Orders:  -     XR LUMBAR SPINE (2-3 VIEWS); Future  Wheezing  Assessment & Plan:   Uncontrolled, get chest xray for intrapulmonary process rule out given hx of dnyamic heart issues. Consider trialing albuterol though no definite wheezing today. Also consider pulmonary function testing.  Orders:  -     XR CHEST (2 VIEWS); Future  -     albuterol sulfate HFA (VENTOLIN HFA) 108 (90 Base) MCG/ACT inhaler; Inhale 2 puffs into the lungs 4 times daily as needed for Wheezing, Disp-18 g, R-0Normal  -     cetirizine (ZYRTEC) 10 MG tablet; Take 1 tablet by mouth daily, Disp-30 tablet, R-0Normal  -     Tryptase; Future  Neck swelling  Assessment & Plan:   Unclear control, consider trialing stopping strattera for a week to see how it that affects things. Also has hx of allergies so would recommend restarting zyrtec.  Orders:  -     cetirizine (ZYRTEC) 10 MG tablet; Take 1 tablet by mouth daily, Disp-30 tablet, R-0Normal  -     TSH; Future  -     T4, Free; Future  -     T3; Future  -     Tryptase; Future  Anxiety  Assessment & Plan:   Uncontrolled, changes made today: stop strattera. Start zoloft first, then stop straterra thereafter 1 week later.  Orders:  -     sertraline (ZOLOFT) 25 MG tablet; Take 1 tablet by mouth daily But for the first 3 days only take a half tablet., Disp-90 tablet,

## 2024-02-14 NOTE — PROGRESS NOTES
Chief Complaint   Patient presents with    Medicare AWV     3 mo fu and needs awv. Per  daughter has notice facial edema maybe secondary to medication.  Some wheezing noted.  Gen health is not great is in a lot of pain whihch make it hard to work out and has had recent weight gain   PT SATS are 92%    Follow-up    Back Pain    Depression

## 2024-02-17 LAB
T3 SERPL-MCNC: 116 NG/DL (ref 71–180)
TRYPTASE SERPL-MCNC: 3 UG/L (ref 2.2–13.2)

## 2024-03-01 ENCOUNTER — HOSPITAL ENCOUNTER (OUTPATIENT)
Facility: HOSPITAL | Age: 69
End: 2024-03-01
Attending: FAMILY MEDICINE
Payer: MEDICARE

## 2024-03-01 DIAGNOSIS — G89.29 CHRONIC BILATERAL LOW BACK PAIN WITHOUT SCIATICA: ICD-10-CM

## 2024-03-01 DIAGNOSIS — M54.50 CHRONIC BILATERAL LOW BACK PAIN WITHOUT SCIATICA: ICD-10-CM

## 2024-03-01 DIAGNOSIS — R06.2 WHEEZING: ICD-10-CM

## 2024-03-01 PROCEDURE — 71046 X-RAY EXAM CHEST 2 VIEWS: CPT

## 2024-03-01 PROCEDURE — 72100 X-RAY EXAM L-S SPINE 2/3 VWS: CPT

## 2024-03-04 ENCOUNTER — OFFICE VISIT (OUTPATIENT)
Age: 69
End: 2024-03-04
Payer: MEDICARE

## 2024-03-04 VITALS — DIASTOLIC BLOOD PRESSURE: 61 MMHG | BODY MASS INDEX: 28.46 KG/M2 | WEIGHT: 171 LBS | SYSTOLIC BLOOD PRESSURE: 97 MMHG

## 2024-03-04 DIAGNOSIS — N83.201 RIGHT OVARIAN CYST: Chronic | ICD-10-CM

## 2024-03-04 DIAGNOSIS — Z01.419 ENCOUNTER FOR GYNECOLOGICAL EXAMINATION: Primary | ICD-10-CM

## 2024-03-04 PROCEDURE — G0101 CA SCREEN;PELVIC/BREAST EXAM: HCPCS | Performed by: OBSTETRICS & GYNECOLOGY

## 2024-03-04 PROCEDURE — G8484 FLU IMMUNIZE NO ADMIN: HCPCS | Performed by: OBSTETRICS & GYNECOLOGY

## 2024-03-04 PROCEDURE — 3078F DIAST BP <80 MM HG: CPT | Performed by: OBSTETRICS & GYNECOLOGY

## 2024-03-04 PROCEDURE — 3074F SYST BP LT 130 MM HG: CPT | Performed by: OBSTETRICS & GYNECOLOGY

## 2024-03-04 NOTE — PROGRESS NOTES
all spheres.  No visual field defect on confrontation exam.  Full eyes movement, with no nystagmus, no diplopia, no ptosis.  Normal gag and swallow.  All remaining cranial nerves were normal  Motor function: 5/5 in all extremities  (+) tenderness bilateral sacroiliac joint  Sensory: intact to LT, PP and JPS  Good FTN and HTS   Gait: Normal    Assessment:       ICD-10-CM    1. Mild cognitive impairment  G31.84       2. Disease of spinal cord, unspecified ([facility])  G95.9       3. Bilateral sacroiliitis ([facility])  M46.1         Possible bilateral sacroilitis    MCI, anxiety/depression     L CTS, Minimal     MRI brain (8/4/22): 1. A 1.8 x 1.5 x 2.0 cm avidly enhancing mass in the region of the pineal gland. Mild mass effect on the superior tectum, without parenchymal edema. Given the presence of a small 6 mm meningioma at the left superior frontal convexity, this is suspicious for a meningioma in the region of the pineal gland. However,  differential considerations are broad, including pineal gland neoplasms (either benign or malignant), lymphoma, and metastasis among other considerations.     L lower back pain - MRI lumbar spine: Mild superior endplate compression deformities at T11 and L1. Less than 50% loss vertebral body height. No cortical retropulsion or epidural hematomas are identified.  These fractures are amenable to percutaneous kyphoplasty for pain palliation.    Plan:   I had a long discussion with patient and daughter. Answered all questions.  Increase Strattera 60 mg QD for attention  Given Athlean X video for lower back to try  Continue Gabapentin 300 mg BID for now  Has a follow up with  Dr Posada in May 2024 for brain mass s/p gamma knife (mass did not shrink so he is thinking it might be a benign mass)  6. Optimize medical management of anxiety/depression c/o PCP  7. Depending on above, will consider referral to pain management for bilateral sacroiliac joint injection      Return in about 3 months (around

## 2024-03-04 NOTE — PROGRESS NOTES
Kalina Shah is a 68 y.o. female returns for an annual exam     Chief Complaint   Patient presents with    Annual Exam       No LMP recorded. Patient is postmenopausal.  Her periods are absent  Problems: no problems  Birth Control: post menopausal status.  Last Pap: see report obtained 1 year(s) ago.  She does not have a history of ANNA 2, 3 or cervical cancer.   Last Mammogram:  5/17/2022 - next mammo scheduled for 3/13/2024  Last Bone Density: see report obtained 2 year(s) ago.; osteopenic  Last colonoscopy: see report obtained 17 year(s) ago.  Patient had ultrasound today to follow up for ovarian cysts:  TA AND TV SCANS PERFORMED FOR BEST VISUALIZATION. UTERUS IS RETROVERTED, NORMAL IN SIZE AND ECHOGENICITY. ENDOMETRIUM MEASURES 3-4MM IN THICKNESS. NO EVIDENCE OF MASSES OR ABNORMALITIES ARE SEEN. RIGHT OVARY APPEARS TO HAVE A SIMPLE CYST THAT MEASURES 31 X 27 X 29MM. LEFT ADNEXA APPEARS WITHIN NORMAL LIMITS. NO FREE FLUID SEEN IN THE CDS.     1. Have you been to the ER, urgent care clinic, or hospitalized since your last visit? No    2. Have you seen or consulted any other health care providers outside of the [facility] since your last visit? No    Examination chaperoned by Shahrzad Ernandez LPN.  
abdomen non-tender to palpation, normal bowel sounds, no masses present  Liver and spleen: no hepatomegaly present, spleen not palpable  Hernias: no hernias identified    Genitourinary  External Genitalia: normal appearance for age, no discharge present, no tenderness present, no inflammatory lesions present, no masses present  Vagina: normal vaginal vault without central or paravaginal defects, thin discharge present, no inflammatory lesions present, no masses present  Bladder: non-tender to palpation  Urethra: appears normal  Cervix: normal   Uterus: normal size, shape and consistency  Adnexa: no adnexal tenderness present, no adnexal masses present  Perineum: perineum within normal limits, no evidence of trauma, no rashes or skin lesions present  Anus: anus within normal limits, no hemorrhoids present  Inguinal Lymph Nodes: no lymphadenopathy present    Skin  General Inspection: no rash, no lesions identified    Neurologic/Psychiatric  Mental Status:  Orientation: grossly oriented to person, place and time  Mood and Affect: mood normal, affect appropriate    Assessment:  68 y.o. No obstetric history on file. for well woman exam  Her current medical status is satisfactory with no evidence of significant gynecologic issues.  Encounter Diagnoses   Name Primary?    Encounter for gynecological examination Yes    Right ovarian cyst        Plan:  I recommended follow up one year for routine annual gynecologic exam or sooner prn  Patient should follow up with a primary care physician for chronic medical problems and evaluation of non-gynecologic concerns and to please contact our office with any GYN questions or concerns.  I recommend STD testing per CDC guidelines and at patient request.   Follow up: well woman exam one year  Discussed typical course of benign/physiologic ovarian cyst vs s/sx ovarian carcinoma.   Notify MD for bowel changes/pain/bloating/early satiety.  Discussed hormonal options vs surgical options vs

## 2024-03-05 ENCOUNTER — OFFICE VISIT (OUTPATIENT)
Age: 69
End: 2024-03-05
Payer: MEDICARE

## 2024-03-05 VITALS
HEART RATE: 62 BPM | DIASTOLIC BLOOD PRESSURE: 60 MMHG | SYSTOLIC BLOOD PRESSURE: 110 MMHG | TEMPERATURE: 96 F | HEIGHT: 65 IN | OXYGEN SATURATION: 95 % | BODY MASS INDEX: 28.46 KG/M2

## 2024-03-05 DIAGNOSIS — M46.1 BILATERAL SACROILIITIS (HCC): ICD-10-CM

## 2024-03-05 DIAGNOSIS — G95.9 DISEASE OF SPINAL CORD, UNSPECIFIED (HCC): ICD-10-CM

## 2024-03-05 DIAGNOSIS — G31.84 MILD COGNITIVE IMPAIRMENT: Primary | ICD-10-CM

## 2024-03-05 PROCEDURE — G8428 CUR MEDS NOT DOCUMENT: HCPCS | Performed by: PSYCHIATRY & NEUROLOGY

## 2024-03-05 PROCEDURE — 99214 OFFICE O/P EST MOD 30 MIN: CPT | Performed by: PSYCHIATRY & NEUROLOGY

## 2024-03-05 PROCEDURE — G8399 PT W/DXA RESULTS DOCUMENT: HCPCS | Performed by: PSYCHIATRY & NEUROLOGY

## 2024-03-05 PROCEDURE — G8484 FLU IMMUNIZE NO ADMIN: HCPCS | Performed by: PSYCHIATRY & NEUROLOGY

## 2024-03-05 PROCEDURE — 3078F DIAST BP <80 MM HG: CPT | Performed by: PSYCHIATRY & NEUROLOGY

## 2024-03-05 PROCEDURE — 3074F SYST BP LT 130 MM HG: CPT | Performed by: PSYCHIATRY & NEUROLOGY

## 2024-03-05 PROCEDURE — 1090F PRES/ABSN URINE INCON ASSESS: CPT | Performed by: PSYCHIATRY & NEUROLOGY

## 2024-03-05 PROCEDURE — 1123F ACP DISCUSS/DSCN MKR DOCD: CPT | Performed by: PSYCHIATRY & NEUROLOGY

## 2024-03-05 PROCEDURE — 3017F COLORECTAL CA SCREEN DOC REV: CPT | Performed by: PSYCHIATRY & NEUROLOGY

## 2024-03-05 PROCEDURE — G8419 CALC BMI OUT NRM PARAM NOF/U: HCPCS | Performed by: PSYCHIATRY & NEUROLOGY

## 2024-03-05 PROCEDURE — 1036F TOBACCO NON-USER: CPT | Performed by: PSYCHIATRY & NEUROLOGY

## 2024-03-05 RX ORDER — ATOMOXETINE 60 MG/1
60 CAPSULE ORAL DAILY
Qty: 30 CAPSULE | Refills: 5 | Status: SHIPPED | OUTPATIENT
Start: 2024-04-04 | End: 2024-10-01

## 2024-03-08 ENCOUNTER — TELEPHONE (OUTPATIENT)
Facility: CLINIC | Age: 69
End: 2024-03-08

## 2024-03-08 NOTE — TELEPHONE ENCOUNTER
Pt needs order for prolia injection. The current one has . Her appointment is on Monday at 1:00    Victorina from [facility]  [phone]

## 2024-03-11 ENCOUNTER — HOSPITAL ENCOUNTER (OUTPATIENT)
Facility: HOSPITAL | Age: 69
Setting detail: INFUSION SERIES
Discharge: HOME | End: 2024-03-11
Payer: MEDICARE

## 2024-03-11 VITALS
SYSTOLIC BLOOD PRESSURE: 97 MMHG | WEIGHT: 168.3 LBS | HEART RATE: 60 BPM | TEMPERATURE: 97.7 F | RESPIRATION RATE: 18 BRPM | BODY MASS INDEX: 28.04 KG/M2 | DIASTOLIC BLOOD PRESSURE: 63 MMHG | OXYGEN SATURATION: 95 % | HEIGHT: 65 IN

## 2024-03-11 DIAGNOSIS — M81.0 OSTEOPOROSIS, UNSPECIFIED OSTEOPOROSIS TYPE, UNSPECIFIED PATHOLOGICAL FRACTURE PRESENCE: Primary | ICD-10-CM

## 2024-03-11 LAB
ALBUMIN SERPL-MCNC: 3.2 G/DL (ref 3.5–5)
ANION GAP SERPL CALC-SCNC: 14 MMOL/L (ref 5–15)
BUN SERPL-MCNC: 8 MG/DL (ref 6–20)
BUN/CREAT SERPL: 14 (ref 12–20)
CALCIUM SERPL-MCNC: 8.6 MG/DL (ref 8.5–10.1)
CHLORIDE SERPL-SCNC: 100 MMOL/L (ref 97–108)
CO2 SERPL-SCNC: 23 MMOL/L (ref 21–32)
CREAT SERPL-MCNC: 0.57 MG/DL (ref 0.55–1.02)
GLUCOSE SERPL-MCNC: 96 MG/DL (ref 65–100)
PHOSPHATE SERPL-MCNC: 3.4 MG/DL (ref 2.6–4.7)
POTASSIUM SERPL-SCNC: 4.3 MMOL/L (ref 3.5–5.1)
SODIUM SERPL-SCNC: 137 MMOL/L (ref 136–145)

## 2024-03-11 PROCEDURE — 6360000002 HC RX W HCPCS: Performed by: FAMILY MEDICINE

## 2024-03-11 PROCEDURE — 36415 COLL VENOUS BLD VENIPUNCTURE: CPT

## 2024-03-11 PROCEDURE — 80069 RENAL FUNCTION PANEL: CPT

## 2024-03-11 PROCEDURE — 96372 THER/PROPH/DIAG INJ SC/IM: CPT

## 2024-03-11 RX ADMIN — DENOSUMAB 60 MG: 60 INJECTION SUBCUTANEOUS at 14:22

## 2024-03-11 NOTE — PROGRESS NOTES
\[facility]\"" Progress Note  Date: March 11, 2024     Treatment: Prolia    Ms. Shah arrived in no acute distress at 1300.    Patient COVID Screening completed:  Do you have any symptoms of COVID-19? SOB, coughing, fever, or generally not feeling well? NO  Have you been exposed to COVID-19 recently? NO  Have you had any recent contact with family/friend that has a pending COVID test? NO    Assessment was unremarkable with no new concerns voiced. Labs drawn via RAC and processed.     Problem: Safety - Adult  Goal: Free from fall injury  Outcome: Progressing     Problem: Chronic Conditions and Co-morbidities  Goal: Patient's chronic conditions and co-morbidity symptoms are monitored and maintained or improved  Outcome: Progressing  Flowsheets (Taken 3/11/2024 1340)  Care Plan - Patient's Chronic Conditions and Co-Morbidity Symptoms are Monitored and Maintained or Improved: Monitor and assess patient's chronic conditions and comorbid symptoms for stability, deterioration, or improvement     Problem: Discharge Planning  Goal: Discharge to home or other facility with appropriate resources  Outcome: Progressing    Ms. Shah's vitals for today's visit.   Patient Vitals for the past 12 hrs:   Temp Pulse Resp BP SpO2   03/11/24 1303 97.7 °F (36.5 °C) 60 18 97/63 95 %     Lab results:  Recent Results (from the past 12 hour(s))   Renal Function Panel    Collection Time: 03/11/24  1:47 PM   Result Value Ref Range    Sodium 137 136 - 145 mmol/L    Potassium 4.3 3.5 - 5.1 mmol/L    Chloride 100 97 - 108 mmol/L    CO2 23 21 - 32 mmol/L    Anion Gap 14 5 - 15 mmol/L    Glucose 96 65 - 100 mg/dL    BUN 8 6 - 20 MG/DL    Creatinine 0.57 0.55 - 1.02 MG/DL    Bun/Cre Ratio 14 12 - 20      Est, Glom Filt Rate >60 >60 ml/min/1.73m2    Calcium 8.6 8.5 - 10.1 MG/DL    Phosphorus 3.4 2.6 - 4.7 MG/DL    Albumin 3.2 (L) 3.5 - 5.0 g/dL     Medications given:  Medications Administered         denosumab (PROLIA) SC injection 60 mg Admin  Date  03/11/2024 Action  Given Dose  60 mg Route  SubCUTAneous Administered By  Kelsi Vasquez RN        Injection given in R arm    Ms. Shah tolerated the treatment without complaints.    [facility] at 1425.     Future Appointments   Date Time Provider Department Center   3/13/2024 12:40 PM CAMILLE ROBGYN MAMMO1 BSROBG [facility]   3/13/2024  1:00 PM Rizwana Machado MD BSROBG [facility]   3/27/2024 10:00 AM Cecilia Hernandez MD CDE [facility]   4/5/2024  9:20 AM Nahid Avelar APRN - NP [facility]   4/17/2024 10:30 AM [facility]   6/10/2024  1:00 PM Ameya Bynum MD NEUROWTCRSPB [facility]   9/11/2024  1:00 PM [facility] INFUSION NURSE 2 [facility]   10/18/2024 11:00 AM Desi Zavala, APRN - NP [facility]   3/17/2025  1:40 PM CAMILLE ROBGYN MAMMO1 BSROBG [facility]   3/17/2025  2:00 PM CAMILLE ROBGYN US1 BSROBG [facility]   3/17/2025  2:40 PM Rizwana Machado MD BSROBG [facility]     Kelsi Vasquez RN  March 11, 2024  2:30 PM

## 2024-03-27 ENCOUNTER — OFFICE VISIT (OUTPATIENT)
Age: 69
End: 2024-03-27
Payer: MEDICARE

## 2024-03-27 VITALS
OXYGEN SATURATION: 95 % | WEIGHT: 169.8 LBS | BODY MASS INDEX: 28.29 KG/M2 | SYSTOLIC BLOOD PRESSURE: 102 MMHG | TEMPERATURE: 97.2 F | HEIGHT: 65 IN | HEART RATE: 74 BPM | DIASTOLIC BLOOD PRESSURE: 72 MMHG

## 2024-03-27 DIAGNOSIS — R63.5 ABNORMAL WEIGHT GAIN: ICD-10-CM

## 2024-03-27 DIAGNOSIS — E04.1 THYROID CYST: ICD-10-CM

## 2024-03-27 DIAGNOSIS — R79.89 LOW T4: Primary | ICD-10-CM

## 2024-03-27 PROCEDURE — G8427 DOCREV CUR MEDS BY ELIG CLIN: HCPCS | Performed by: INTERNAL MEDICINE

## 2024-03-27 PROCEDURE — 3017F COLORECTAL CA SCREEN DOC REV: CPT | Performed by: INTERNAL MEDICINE

## 2024-03-27 PROCEDURE — 1090F PRES/ABSN URINE INCON ASSESS: CPT | Performed by: INTERNAL MEDICINE

## 2024-03-27 PROCEDURE — 3074F SYST BP LT 130 MM HG: CPT | Performed by: INTERNAL MEDICINE

## 2024-03-27 PROCEDURE — 1036F TOBACCO NON-USER: CPT | Performed by: INTERNAL MEDICINE

## 2024-03-27 PROCEDURE — 1123F ACP DISCUSS/DSCN MKR DOCD: CPT | Performed by: INTERNAL MEDICINE

## 2024-03-27 PROCEDURE — 99204 OFFICE O/P NEW MOD 45 MIN: CPT | Performed by: INTERNAL MEDICINE

## 2024-03-27 PROCEDURE — G8419 CALC BMI OUT NRM PARAM NOF/U: HCPCS | Performed by: INTERNAL MEDICINE

## 2024-03-27 PROCEDURE — 3078F DIAST BP <80 MM HG: CPT | Performed by: INTERNAL MEDICINE

## 2024-03-27 PROCEDURE — G8484 FLU IMMUNIZE NO ADMIN: HCPCS | Performed by: INTERNAL MEDICINE

## 2024-03-27 PROCEDURE — G8399 PT W/DXA RESULTS DOCUMENT: HCPCS | Performed by: INTERNAL MEDICINE

## 2024-03-27 RX ORDER — DEXAMETHASONE 1 MG
TABLET ORAL
Qty: 1 TABLET | Refills: 0 | Status: SHIPPED | OUTPATIENT
Start: 2024-03-27

## 2024-03-27 RX ORDER — CETIRIZINE HYDROCHLORIDE 10 MG/1
10 TABLET ORAL DAILY
COMMUNITY

## 2024-03-27 RX ORDER — LANOLIN ALCOHOL/MO/W.PET/CERES
1000 CREAM (GRAM) TOPICAL EVERY EVENING
COMMUNITY

## 2024-03-27 NOTE — PATIENT INSTRUCTIONS
DEXAMETHASONE SUPPRESSION TEST: PATIENT INSTRUCTIONS   1. Take 1 mg of Dexamethasone by mouth between 11pm and midnight, it has been sent to your pharmacy.     2. The very next morning, you are to have the ordered blood tests drawn between 8 am and 8:30 am.     If the blood tests are not drawn at the right time, this may make the test results invalid, and so may need to be repeated.   If you are currently taking steroids, please let your doctor know, as this may interfere with the test

## 2024-03-27 NOTE — PROGRESS NOTES
Kalina Shah is a 68 y.o. female here for No chief complaint on file.      1. Have you been to the ER, urgent care clinic since your last visit?  Hospitalized since your last visit? -N/A    2. Have you seen or consulted any other health care providers outside of the [facility] since your last visit?  Include any pap smears or colon screening.-N/A

## 2024-03-27 NOTE — FLOWSHEET NOTE
[facility]  Endoscopy Preprocedure Instructions      1. On the day of your surgery, please report to registration located on the 2nd floor of the  the Main Hospital. yes    2. You must have a responsible adult to drive you to the hospital, stay at the hospital during your procedure and drive you home. If they leave your procedure will not be started (no exceptions). yes    3. Do not have anything to eat or drink (including water, gum, mints, coffee, and juice) after midnight. This does not apply to the medications you were instructed to take by your physician.yes  If you are currently taking Plavix, Coumadin, Aspirin, or other blood-thinning agents, contact your physician for special instructions. yes,aspirin    4. If you are having a procedure that requires bowel prep: We recommend that you have only clear liquids the day before your procedure and begin your bowel prep by 5:00 pm.  You may continue to drink clear liquids until midnight.  If for any reason you are not able to complete your prep please notify your physician immediately. yes    5. Have a list of all current medications, including vitamins, herbal supplements and any other over the counter medications. Reviewed over the phone    6. If you wear glasses, contacts, dentures and/or hearing aids, they may be removed prior to procedure, please bring a case to store them in. yes    7. You should understand that if you do not follow these instructions your procedure may be cancelled.  If your physical condition changes (I.e. fever, cold or flu) please contact your doctor as soon as possible.    8. It is important that you be on time.  If for any reason you are unable to keep your appointment please call [phone] the day of or your physician’s office prior to your scheduled procedure    9. Have you received your COVID Vaccine? yes If no, you will need to receive a COVID test/swab here at [facility] the [facility] parking lot Monday - Friday 8a -

## 2024-03-27 NOTE — PROGRESS NOTES
Endocrine Consultation    PCP: Josué Goodrich MD    Chief Complaint   Patient presents with    New Patient    Other     Low T4     HPI:  Kalina Shah is a 68 y.o. female with  has a past medical history of Allergic conjunctivitis of both eyes, Brain tumor (HCC), Cognitive decline, Depression, Essential hypertension, H/O mammogram, Hot flashes, Hot flashes, Hypercholesterolemia, Obstructive sleep apnea, Osteoarthritis of right knee, Osteoarthritis of right knee, and Pap smear for cervical cancer screening. who is seen in consultation at the request of Josué Goodrich MD for evaluation of low t4 level.     Tfts checked d/t weight gain, neck swelling and fatigue     Prior endo note with dr roger chaudhary 6/2022  \"CT chest in April for significant swelling supra-clavicular area - everyone once in awhile causes pressure on the right side  CT did not show enlarged thyroid gland, but showed a thyroid nodule  No recent TSH level  Mother - goiter - surgery  2 maternal aunts also with goiter  4/8/22 CT Chest w/Con - THYROID: In the left lobe of thyroid gland there is a 9 mm hypodense nodule\"    7/2022 - US neck   Nearly completely cystic left lobe nodule measuring 1.3 cm with a small mural solid component.    9/2022 - CT A/P  Adrenals: The adrenals are normal on noncontrast images.   Pancreas: The pancreas is normal on noncontrast images.     \"Dr Posada in May 2024 for brain mass s/p gamma knife (mass did not shrink so he is thinking it might be a benign mass)\"    Full ROS completed this visit:  +weight gain x 30 lbs in 1 year   Negative for weight loss, fevers, chills, blurry vision, changes in vision, chest pain, palpitations, leg swelling, shortness of breath, wheezing, snoring, abdominal pain, nausea, vomiting, diarrhea, constipation, frequent urination, dysuria, tremors, fainting, shakes, cold intolerance, hot intolerance, depression, anxiety, foot sores, rash.    LABS/STUDIES:     No results found for:

## 2024-03-28 ENCOUNTER — TELEPHONE (OUTPATIENT)
Age: 69
End: 2024-03-28

## 2024-03-28 NOTE — TELEPHONE ENCOUNTER
Spoke to Kan at the pharmacy verbal order given ok to fill 0.5mg give 2 tabs. To equal original 1 mg.

## 2024-03-28 NOTE — TELEPHONE ENCOUNTER
[facility] is requesting to change the dexamethasone 1mg to 0.5mg 2 tabs please call to approve or discuss

## 2024-04-02 ENCOUNTER — ANESTHESIA (OUTPATIENT)
Facility: HOSPITAL | Age: 69
End: 2024-04-02
Payer: MEDICARE

## 2024-04-02 ENCOUNTER — ANESTHESIA EVENT (OUTPATIENT)
Facility: HOSPITAL | Age: 69
End: 2024-04-02
Payer: MEDICARE

## 2024-04-02 ENCOUNTER — HOSPITAL ENCOUNTER (OUTPATIENT)
Facility: HOSPITAL | Age: 69
Setting detail: OUTPATIENT SURGERY
Discharge: HOME | End: 2024-04-02
Attending: INTERNAL MEDICINE | Admitting: INTERNAL MEDICINE
Payer: MEDICARE

## 2024-04-02 VITALS
BODY MASS INDEX: 28.03 KG/M2 | HEIGHT: 65 IN | OXYGEN SATURATION: 96 % | RESPIRATION RATE: 18 BRPM | SYSTOLIC BLOOD PRESSURE: 120 MMHG | TEMPERATURE: 98 F | DIASTOLIC BLOOD PRESSURE: 80 MMHG | WEIGHT: 168.21 LBS | HEART RATE: 70 BPM

## 2024-04-02 PROCEDURE — 88305 TISSUE EXAM BY PATHOLOGIST: CPT

## 2024-04-02 PROCEDURE — 6360000002 HC RX W HCPCS: Performed by: NURSE ANESTHETIST, CERTIFIED REGISTERED

## 2024-04-02 PROCEDURE — 3700000000 HC ANESTHESIA ATTENDED CARE: Performed by: INTERNAL MEDICINE

## 2024-04-02 PROCEDURE — 7100000010 HC PHASE II RECOVERY - FIRST 15 MIN: Performed by: INTERNAL MEDICINE

## 2024-04-02 PROCEDURE — 7100000011 HC PHASE II RECOVERY - ADDTL 15 MIN: Performed by: INTERNAL MEDICINE

## 2024-04-02 PROCEDURE — 3600007512: Performed by: INTERNAL MEDICINE

## 2024-04-02 PROCEDURE — 3700000001 HC ADD 15 MINUTES (ANESTHESIA): Performed by: INTERNAL MEDICINE

## 2024-04-02 PROCEDURE — 3600007502: Performed by: INTERNAL MEDICINE

## 2024-04-02 PROCEDURE — 2580000003 HC RX 258: Performed by: NURSE ANESTHETIST, CERTIFIED REGISTERED

## 2024-04-02 RX ORDER — SODIUM CHLORIDE 0.9 % (FLUSH) 0.9 %
5-40 SYRINGE (ML) INJECTION PRN
Status: CANCELLED | OUTPATIENT
Start: 2024-04-02

## 2024-04-02 RX ORDER — SODIUM CHLORIDE 0.9 % (FLUSH) 0.9 %
5-40 SYRINGE (ML) INJECTION EVERY 12 HOURS SCHEDULED
Status: CANCELLED | OUTPATIENT
Start: 2024-04-02

## 2024-04-02 RX ORDER — SODIUM CHLORIDE 9 MG/ML
INJECTION, SOLUTION INTRAVENOUS CONTINUOUS PRN
Status: DISCONTINUED | OUTPATIENT
Start: 2024-04-02 | End: 2024-04-02 | Stop reason: SDUPTHER

## 2024-04-02 RX ORDER — SODIUM CHLORIDE 9 MG/ML
25 INJECTION, SOLUTION INTRAVENOUS PRN
Status: CANCELLED | OUTPATIENT
Start: 2024-04-02

## 2024-04-02 RX ORDER — LIDOCAINE HCL/PF 100 MG/5ML
SYRINGE (ML) INJECTION PRN
Status: DISCONTINUED | OUTPATIENT
Start: 2024-04-02 | End: 2024-04-02 | Stop reason: SDUPTHER

## 2024-04-02 RX ORDER — PROPOFOL 10 MG/ML
INJECTION, EMULSION INTRAVENOUS CONTINUOUS PRN
Status: DISCONTINUED | OUTPATIENT
Start: 2024-04-02 | End: 2024-04-02 | Stop reason: SDUPTHER

## 2024-04-02 RX ORDER — SIMETHICONE 40MG/0.6ML
SUSPENSION, DROPS(FINAL DOSAGE FORM)(ML) ORAL
Status: DISCONTINUED
Start: 2024-04-02 | End: 2024-04-02 | Stop reason: HOSPADM

## 2024-04-02 RX ADMIN — PROPOFOL 30 MG: 10 INJECTION, EMULSION INTRAVENOUS at 08:59

## 2024-04-02 RX ADMIN — PROPOFOL 30 MG: 10 INJECTION, EMULSION INTRAVENOUS at 08:55

## 2024-04-02 RX ADMIN — PROPOFOL 70 MG: 10 INJECTION, EMULSION INTRAVENOUS at 08:54

## 2024-04-02 RX ADMIN — LIDOCAINE HYDROCHLORIDE 20 MG: 20 INJECTION INTRAVENOUS at 08:54

## 2024-04-02 RX ADMIN — PROPOFOL 40 MG: 10 INJECTION, EMULSION INTRAVENOUS at 09:08

## 2024-04-02 RX ADMIN — PROPOFOL 30 MG: 10 INJECTION, EMULSION INTRAVENOUS at 08:58

## 2024-04-02 RX ADMIN — SODIUM CHLORIDE: 9 INJECTION, SOLUTION INTRAVENOUS at 08:50

## 2024-04-02 RX ADMIN — PROPOFOL 120 MCG/KG/MIN: 10 INJECTION, EMULSION INTRAVENOUS at 08:53

## 2024-04-02 RX ADMIN — PROPOFOL 30 MG: 10 INJECTION, EMULSION INTRAVENOUS at 08:57

## 2024-04-02 ASSESSMENT — PAIN DESCRIPTION - DESCRIPTORS: DESCRIPTORS: ACHING

## 2024-04-02 ASSESSMENT — PAIN - FUNCTIONAL ASSESSMENT
PAIN_FUNCTIONAL_ASSESSMENT: 0-10
PAIN_FUNCTIONAL_ASSESSMENT: ACTIVITIES ARE NOT PREVENTED

## 2024-04-02 NOTE — DISCHARGE INSTRUCTIONS
2024    Kalina Shah  :  1955  [facility] Medical Record Number:  [id number]      COLONOSCOPY FINDINGS:  Your colonoscopy showed: 2 polyps removed.    DISCOMFORT:  Redness at IV site- apply warm compress to area; if redness or soreness persist- contact your physician  There may be a slight amount of blood passed from the rectum  Gaseous discomfort- walking, belching will help relieve any discomfort  You may not operate a vehicle for 12 hours  You may not engage in an occupation involving machinery or appliances for rest of today  You may not drink alcoholic beverages for at least 12 hours  Avoid making any critical decisions for at least 24 hour  DIET:   Regular diet   - however -  remember your colon is empty and a heavy meal will produce gas.   Avoid these foods:  vegetables, fried / greasy foods, carbonated drinks for today     ACTIVITY:  You may resume your normal daily activities it is recommended that you spend the remainder of the day resting -  avoid any strenuous activity.    CALL M.D.  ANY SIGN OF:   Increasing pain, nausea, vomiting  Abdominal distension (swelling)  New increased bleeding (oral or rectal)  Fever (chills)  Pain in chest area  Bloody discharge from nose or mouth   Shortness of breath    Follow-up Instructions:   Call Dr. Navarro if any questions or problems.   Telephone # [phone]  Biopsy results will be available in  5 to 7 days  Should have a repeat colonoscopy in 7 to 10 years

## 2024-04-02 NOTE — ANESTHESIA POSTPROCEDURE EVALUATION
Department of Anesthesiology  Postprocedure Note    Patient: Kalina Shah  MRN: [id number]  YOB: 1955  Date of evaluation: 4/2/2024    Procedure Summary       Date: 04/02/24 Room / Location: [place] [address] / [facility] ENDOSCOPY    Anesthesia Start: 0850 Anesthesia Stop: 0913    Procedures:       COLONOSCOPY      COLONOSCOPY POLYPECTOMY SNARE/COLD BIOPSY (Lower GI Region) Diagnosis:       Special screening for malignant neoplasms, colon      (Special screening for malignant neoplasms, colon [Z12.11])    Surgeons: Seb Navarro MD Responsible Provider: James Hankins MD    Anesthesia Type: MAC ASA Status: 3            Anesthesia Type: MAC    Favian Phase I: Favian Score: 10    Favian Phase II: Favian Score: 10    Anesthesia Post Evaluation    Patient location during evaluation: PACU  Patient participation: complete - patient participated  Level of consciousness: awake  Airway patency: patent  Nausea & Vomiting: no vomiting and no nausea  Cardiovascular status: hemodynamically stable  Respiratory status: acceptable  Hydration status: stable  Pain management: adequate    No notable events documented.

## 2024-04-02 NOTE — PROCEDURES
[facility]  Seb Navarro M.D.  [phone]            2024          Colonoscopy Operative Report  Kalina Shah  :  1955  [facility] Medical Record Number:  [id number]      Indications:    Screening colonoscopy     :  Seb Navarro MD    Assistant -- None  Implants -- None    Referring Provider: Josué Goodrich MD    Sedation:  MAC anesthesia Propofol    Pre-Procedural Exam:      Airway: clear,  No airway problems anticipated  Heart: RRR, without gallops or rubs  Lungs: clear bilaterally without wheezes, crackles, or rhonchi  Abdomen: soft, nontender, nondistended, bowel sounds present  Mental Status: awake, alert and oriented to person, place and time     Procedure Details:  After informed consent was obtained with all risks and benefits of procedure explained and preoperative exam completed, the patient was taken to the endoscopy suite and placed in the left lateral decubitus position.  Upon sequential sedation as per above, a digital rectal exam was performed. The Olympus videocolonoscope  was inserted in the rectum and carefully advanced to the terminal ileum.  The quality of preparation was good.  The colonoscope was slowly withdrawn with careful inspection and evaluation between folds. Retroflexion in the rectum was performed.    Findings:   Terminal Ileum: normal  Cecum: normal  Ascending Colon: 2  Sessile polyp(s), the largest 6 mm in size;  Transverse Colon: normal  Descending Colon: normal  Sigmoid: normal  Rectum: normal    Interventions:  2 complete polypectomy were performed using cold snare  and the polyps were  retrieved    Specimen Removed:  specimen #1, 6 mm in size, located in the ascending colon removed by cold snare and retrieved for pathology    Complications: None.     EBL:  None.    Impression: 2 polyps removed as above    Recommendations:  -Await pathology.  -If adenoma is present, repeat colonoscopy in 7 years.     Discharge Disposition:

## 2024-04-02 NOTE — H&P
[facility]  Seb Navarro M.D.  [phone]            History and Physical       NAME:  Kalina Shah   :   1955   MRN:   [id number]       Referring Physician:  Josué Goodrich MD      Consult Date: 2024 8:42 AM    Chief Complaint:  Colon cancer screening    History of Present Illness:  Patient is a 68 y.o. who is seen for colon cancer screening. Denies any ongoing GI complaints.      PMH:  Past Medical History:   Diagnosis Date    Allergic conjunctivitis of both eyes 10/19/2015    Brain tumor (HCC)     gamma knife/one radiation treatment/benign tumor per pt    Chest pain     occasional/heart valve is a little narrower per patient    Chronic back pain     Cognitive decline     Depression 2014    Essential hypertension 12/15/2017    H/O mammogram 2024    low risk    Hot flashes 2014    Hot flashes     Hypercholesterolemia     Obstructive sleep apnea     uses CPAP    Osteoarthritis of right knee 2014    Osteoarthritis of right knee 2014    Osteoporosis     Pap smear for cervical cancer screening 2023    wnl       PSH:  Past Surgical History:   Procedure Laterality Date    CARDIAC PROCEDURE N/A 2023    Left heart cath / coronary angiography performed by Iftikhar Elliott MD at [facility] CARDIAC CATH LAB/nml per pt    COLONOSCOPY      2 /10 normal    IR KYPHOPLASTY LUMBAR FIRST LEVEL  10/25/2022    IR KYPHOPLASTY LUMBAR FIRST LEVEL 10/25/2022 [facility] RAD ANGIO IR    IR KYPHOPLASTY LUMBAR FIRST LEVEL  10/25/2022    OTHER SURGICAL HISTORY      gamma knife       Allergies:  Allergies   Allergen Reactions    Venlafaxine Other (See Comments)     jittery       Home Medications:  Prior to Admission Medications   Prescriptions Last Dose Informant Patient Reported? Taking?   albuterol sulfate HFA (VENTOLIN HFA) 108 (90 Base) MCG/ACT inhaler Not Taking  No No   Sig: Inhale 2 puffs into the lungs 4 times daily as needed for Wheezing   Patient not taking:

## 2024-04-02 NOTE — ANESTHESIA PRE PROCEDURE
Department of Anesthesiology  Preprocedure Note       Name:  Kalina Shah   Age:  68 y.o.  :  1955                                          MRN:  [id number]         Date:  2024      Surgeon: Surgeon(s):  Seb Navarro MD    Procedure: Procedure(s):  COLONOSCOPY    Medications prior to admission:   Prior to Admission medications    Medication Sig Start Date End Date Taking? Authorizing Provider   cetirizine (ZYRTEC) 10 MG tablet Take 1 tablet by mouth daily   Yes ProviderCaity MD   vitamin B-12 (CYANOCOBALAMIN) 1000 MCG tablet Take 1 tablet by mouth every evening   Yes Provider, MD Caity   diclofenac sodium (VOLTAREN) 1 % GEL Apply topically as needed for Pain   Yes Provider, MD Caity   dexAMETHasone (DECADRON) 1 MG tablet Take 1 tab by mouth at 11 PM the night before AM labs. Have labs drawn between 8-8:30 am the next morning. 3/27/24   Cecilia Hernandez MD   atomoxetine (STRATTERA) 60 MG capsule Take 1 capsule by mouth daily  Patient not taking: Reported on 2024 4/4/24 10/1/24  Ameya Bynum MD   albuterol sulfate HFA (VENTOLIN HFA) 108 (90 Base) MCG/ACT inhaler Inhale 2 puffs into the lungs 4 times daily as needed for Wheezing  Patient not taking: Reported on 2024   Josué Goodrich MD   sertraline (ZOLOFT) 25 MG tablet Take 1 tablet by mouth daily But for the first 3 days only take a half tablet. 24   Josué Goodrich MD   aspirin 81 MG EC tablet Take 1 tablet by mouth daily 24   Nahid Avelar APRN - NP   atorvastatin (LIPITOR) 40 MG tablet Take 1 tablet by mouth daily 24   Nahid Avelar APRN - NP   gabapentin (NEURONTIN) 300 MG capsule Take 1 capsule by mouth 2 times daily for 360 days. Max Daily Amount: 600 mg 24  Josué Goodrich MD   busPIRone (BUSPAR) 10 MG tablet Take 1 tablet by mouth 2 times daily 23   Josué Goodrich MD   nitroGLYCERIN (NITROSTAT) 0.4 MG SL tablet Place 1 tablet under the tongue every 5 minutes as needed

## 2024-04-02 NOTE — PROGRESS NOTES
Endoscopy discharge instructions have been reviewed and given to patient.  The patient verbalized understanding and acceptance of instructions.      Dr. Navarro discussed with patient and son procedure findings and next steps.

## 2024-04-04 DIAGNOSIS — R63.5 ABNORMAL WEIGHT GAIN: ICD-10-CM

## 2024-04-04 LAB — CORTIS AM PEAK SERPL-MCNC: 2.4 UG/DL (ref 4.3–22.45)

## 2024-04-04 ASSESSMENT — VISUAL ACUITY: OU: 1

## 2024-04-04 NOTE — PROGRESS NOTES
Subjective:   Kalina Shah is a 68 y.o.  female with a past medical history including hypertension, hypercholesterolemia, KATJA, osteopenia, depression, pineal tumor, and anxiety presents today for 6 moth follow up.    Patient presents today for well-controlled blood pressure and a resting pulse in the 60s.  She appears very well on exam and has no cardiac complaints.  She has been having progressive dyspnea with exertion that is also associated with wheezing.  She was recently prescribed a bronchodilator but has not tried it yet.  She denies any chest pain/pressure/tightness, palpitations, presyncope, or recurrent syncope.    Risk factors for cardiovascular disease are well-controlled.  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  She is on a beta-blocker to prolong the diastolic phase.    She is being followed by multiple other specialties, including sleep medicine, endocrinology, primary care, and neurology very closely.  She apparently might have Cushing's syndrome and is having additional testing to work that up.        Previous visit on 10.6.23:  Since her last visit, Ms. Shah has had a left heart catheterization which shows bridging of the LAD with 70% stenosis during systole and 0% stenosis during diastole.  Recommendations from the catheterization was to continue beta-blocker therapy.    From a symptom standpoint, Ms. Shah continues to do quite well.  She continues to have some episodes of atypical chest pain that does not seem to be progressive or specifically associated with exertion.    She has not had any additional syncopal episodes or presyncopal episodes.    She feels like her cognitive function has improved since cutting back on wine and seeing neurology.    Her BP is very well controlled and she has no active

## 2024-04-05 ENCOUNTER — OFFICE VISIT (OUTPATIENT)
Age: 69
End: 2024-04-05
Payer: MEDICARE

## 2024-04-05 VITALS
HEIGHT: 65 IN | BODY MASS INDEX: 27.99 KG/M2 | DIASTOLIC BLOOD PRESSURE: 76 MMHG | HEART RATE: 69 BPM | OXYGEN SATURATION: 95 % | SYSTOLIC BLOOD PRESSURE: 128 MMHG | WEIGHT: 168 LBS

## 2024-04-05 DIAGNOSIS — R06.09 DYSPNEA ON EXERTION: Primary | ICD-10-CM

## 2024-04-05 DIAGNOSIS — G47.33 OBSTRUCTIVE SLEEP APNEA SYNDROME: ICD-10-CM

## 2024-04-05 DIAGNOSIS — I10 PRIMARY HYPERTENSION: ICD-10-CM

## 2024-04-05 DIAGNOSIS — Z87.898 HISTORY OF SYNCOPE: ICD-10-CM

## 2024-04-05 PROCEDURE — 99214 OFFICE O/P EST MOD 30 MIN: CPT | Performed by: NURSE PRACTITIONER

## 2024-04-05 PROCEDURE — G8427 DOCREV CUR MEDS BY ELIG CLIN: HCPCS | Performed by: NURSE PRACTITIONER

## 2024-04-05 PROCEDURE — 1123F ACP DISCUSS/DSCN MKR DOCD: CPT | Performed by: NURSE PRACTITIONER

## 2024-04-05 PROCEDURE — 1036F TOBACCO NON-USER: CPT | Performed by: NURSE PRACTITIONER

## 2024-04-05 PROCEDURE — G8419 CALC BMI OUT NRM PARAM NOF/U: HCPCS | Performed by: NURSE PRACTITIONER

## 2024-04-05 PROCEDURE — 3078F DIAST BP <80 MM HG: CPT | Performed by: NURSE PRACTITIONER

## 2024-04-05 PROCEDURE — 3074F SYST BP LT 130 MM HG: CPT | Performed by: NURSE PRACTITIONER

## 2024-04-05 PROCEDURE — 3017F COLORECTAL CA SCREEN DOC REV: CPT | Performed by: NURSE PRACTITIONER

## 2024-04-05 PROCEDURE — 1090F PRES/ABSN URINE INCON ASSESS: CPT | Performed by: NURSE PRACTITIONER

## 2024-04-05 PROCEDURE — G8399 PT W/DXA RESULTS DOCUMENT: HCPCS | Performed by: NURSE PRACTITIONER

## 2024-04-05 RX ORDER — DEXAMETHASONE 0.5 MG/1
TABLET ORAL
COMMUNITY
Start: 2024-03-27

## 2024-04-05 ASSESSMENT — PATIENT HEALTH QUESTIONNAIRE - PHQ9
2. FEELING DOWN, DEPRESSED OR HOPELESS: NOT AT ALL
SUM OF ALL RESPONSES TO PHQ9 QUESTIONS 1 & 2: 0
1. LITTLE INTEREST OR PLEASURE IN DOING THINGS: NOT AT ALL
SUM OF ALL RESPONSES TO PHQ QUESTIONS 1-9: 0

## 2024-04-05 NOTE — PATIENT INSTRUCTIONS
The testing of your heart has looked EXCELLENT.  Let's plan on yearly follow ups.    Please reach out sooner if you have increased shortness of breath (not improved with inhaler), chest pain, palpitations, or another fainting / near fainting spell.

## 2024-04-09 NOTE — TELEPHONE ENCOUNTER
[facility] faxed refill request:    Cetirizine 10 mg    Take one tablet by mouth every day.    Quantity: 30    Ldm

## 2024-04-10 ENCOUNTER — HOSPITAL ENCOUNTER (OUTPATIENT)
Facility: HOSPITAL | Age: 69
Discharge: HOME | End: 2024-04-13
Attending: INTERNAL MEDICINE
Payer: MEDICARE

## 2024-04-10 DIAGNOSIS — E04.1 THYROID CYST: ICD-10-CM

## 2024-04-10 PROCEDURE — 76536 US EXAM OF HEAD AND NECK: CPT

## 2024-04-10 RX ORDER — CETIRIZINE HYDROCHLORIDE 10 MG/1
10 TABLET ORAL DAILY
Qty: 90 TABLET | Refills: 0 | Status: SHIPPED | OUTPATIENT
Start: 2024-04-10 | End: 2024-07-09

## 2024-04-17 ENCOUNTER — OFFICE VISIT (OUTPATIENT)
Facility: CLINIC | Age: 69
End: 2024-04-17
Payer: MEDICARE

## 2024-04-17 VITALS
TEMPERATURE: 97.2 F | BODY MASS INDEX: 27.99 KG/M2 | OXYGEN SATURATION: 97 % | DIASTOLIC BLOOD PRESSURE: 75 MMHG | HEIGHT: 65 IN | RESPIRATION RATE: 20 BRPM | SYSTOLIC BLOOD PRESSURE: 111 MMHG | HEART RATE: 73 BPM | WEIGHT: 168 LBS

## 2024-04-17 DIAGNOSIS — G25.0 ESSENTIAL TREMOR: Primary | ICD-10-CM

## 2024-04-17 DIAGNOSIS — F41.9 ANXIETY: ICD-10-CM

## 2024-04-17 DIAGNOSIS — M54.50 CHRONIC BILATERAL LOW BACK PAIN WITHOUT SCIATICA: ICD-10-CM

## 2024-04-17 DIAGNOSIS — G89.29 CHRONIC BILATERAL LOW BACK PAIN WITHOUT SCIATICA: ICD-10-CM

## 2024-04-17 DIAGNOSIS — B35.1 ONYCHOMYCOSIS: ICD-10-CM

## 2024-04-17 PROCEDURE — 99215 OFFICE O/P EST HI 40 MIN: CPT | Performed by: FAMILY MEDICINE

## 2024-04-17 PROCEDURE — 1090F PRES/ABSN URINE INCON ASSESS: CPT | Performed by: FAMILY MEDICINE

## 2024-04-17 PROCEDURE — G8399 PT W/DXA RESULTS DOCUMENT: HCPCS | Performed by: FAMILY MEDICINE

## 2024-04-17 PROCEDURE — G8419 CALC BMI OUT NRM PARAM NOF/U: HCPCS | Performed by: FAMILY MEDICINE

## 2024-04-17 PROCEDURE — G8427 DOCREV CUR MEDS BY ELIG CLIN: HCPCS | Performed by: FAMILY MEDICINE

## 2024-04-17 PROCEDURE — 1036F TOBACCO NON-USER: CPT | Performed by: FAMILY MEDICINE

## 2024-04-17 PROCEDURE — 1123F ACP DISCUSS/DSCN MKR DOCD: CPT | Performed by: FAMILY MEDICINE

## 2024-04-17 PROCEDURE — 3078F DIAST BP <80 MM HG: CPT | Performed by: FAMILY MEDICINE

## 2024-04-17 PROCEDURE — 3074F SYST BP LT 130 MM HG: CPT | Performed by: FAMILY MEDICINE

## 2024-04-17 PROCEDURE — 3017F COLORECTAL CA SCREEN DOC REV: CPT | Performed by: FAMILY MEDICINE

## 2024-04-17 NOTE — ASSESSMENT & PLAN NOTE
Uncontrolled, changes made today: start pain medicine injections. Continue voltaren gel and topical treatments.

## 2024-04-17 NOTE — PROGRESS NOTES
SUBJECTIVES  with respective ASSESSMENT/PLAN:  Ms. Kalina Shah is a 68 y.o. female established patient who presents for Follow-up (2 mo still shaking pretty ba.  Being seen by endo and tested for poss cushions. Having back pain increased recently no dylan.  Did pt for a while which was not working.  ) and Depression  , found to have the followin. Essential tremor  Overview:  Medications: propranolol 60mg daily    Interval Hx: back on medication. Has noticed gotten a little worse since backing down on regular wine use. Also started sertraline and stopped Strattera too so not exactly sure why.  Assessment & Plan:    Not interested in medication intervention.  2. Anxiety  Overview:  Medication: Sertraline 25mg daily, Buspar 10mg BID, propranolol ER 60mg daily, .  Prev meds: straterra 40mg daily for ADHD related anxiety? (managed by Misa Tolentino)    Interval HX: doing much better, now only anxiousness is related to endocrine workup, not general things. Denies SI/HI.  Assessment & Plan:   Borderline controlled, continue current medications and continue current treatment plan  3. Chronic bilateral low back pain without sciatica  Overview:  Intermittent flares paraspinal muscle related  spot in L low back/buttock region but also on R side. Positional discomfort, temporarily responsive to topicals and heat but not sustained. No radiation of pain down leg.    Interval Hx:  - moved into chronic back pain at this point, several months. Completed PT with no improvement. Continues to have spasming. Xray negative for new fractures.      Assessment & Plan:   Uncontrolled, changes made today: start pain medicine injections. Continue voltaren gel and topical treatments.   Orders:  -     External Referral To Pain Clinic  4. Onychomycosis  Overview:  Bilateral great toe nail plates distal portion leading to mild dystrophy clawed nails.  Assessment & Plan:  Topical treatments recommended. Cream, soaks, and lacquer,

## 2024-04-18 DIAGNOSIS — M54.40 ACUTE LEFT-SIDED LOW BACK PAIN WITH SCIATICA, SCIATICA LATERALITY UNSPECIFIED: ICD-10-CM

## 2024-04-18 RX ORDER — GABAPENTIN 300 MG/1
300 CAPSULE ORAL 2 TIMES DAILY
Qty: 180 CAPSULE | Refills: 3 | Status: SHIPPED | OUTPATIENT
Start: 2024-04-18 | End: 2025-04-13

## 2024-04-18 NOTE — TELEPHONE ENCOUNTER
[facility]   [address]    GABAPENTIN 300 MG    Take one capsule by mouth twice daily as directed. Max daily amount 600 mg    Qty 180

## 2024-04-30 ENCOUNTER — HOSPITAL ENCOUNTER (OUTPATIENT)
Facility: HOSPITAL | Age: 69
Discharge: HOME | End: 2024-05-03
Attending: ANESTHESIOLOGY
Payer: MEDICARE

## 2024-04-30 DIAGNOSIS — M47.816 SPONDYLOSIS WITHOUT MYELOPATHY OR RADICULOPATHY, LUMBAR REGION: ICD-10-CM

## 2024-04-30 DIAGNOSIS — M47.816 LUMBAR SPONDYLOSIS: ICD-10-CM

## 2024-04-30 PROCEDURE — 72148 MRI LUMBAR SPINE W/O DYE: CPT

## 2024-04-30 PROCEDURE — 72114 X-RAY EXAM L-S SPINE BENDING: CPT

## 2024-05-17 ENCOUNTER — OFFICE VISIT (OUTPATIENT)
Age: 69
End: 2024-05-17
Payer: MEDICARE

## 2024-05-17 VITALS
HEIGHT: 65 IN | DIASTOLIC BLOOD PRESSURE: 76 MMHG | BODY MASS INDEX: 28.66 KG/M2 | WEIGHT: 172 LBS | SYSTOLIC BLOOD PRESSURE: 105 MMHG | HEART RATE: 73 BPM | OXYGEN SATURATION: 96 %

## 2024-05-17 DIAGNOSIS — R79.89 LOW T4: ICD-10-CM

## 2024-05-17 DIAGNOSIS — R22.1 NECK FULLNESS: ICD-10-CM

## 2024-05-17 DIAGNOSIS — R63.5 ABNORMAL WEIGHT GAIN: Primary | ICD-10-CM

## 2024-05-17 DIAGNOSIS — E04.1 THYROID CYST: ICD-10-CM

## 2024-05-17 PROCEDURE — 1090F PRES/ABSN URINE INCON ASSESS: CPT | Performed by: INTERNAL MEDICINE

## 2024-05-17 PROCEDURE — 3017F COLORECTAL CA SCREEN DOC REV: CPT | Performed by: INTERNAL MEDICINE

## 2024-05-17 PROCEDURE — 3078F DIAST BP <80 MM HG: CPT | Performed by: INTERNAL MEDICINE

## 2024-05-17 PROCEDURE — G8399 PT W/DXA RESULTS DOCUMENT: HCPCS | Performed by: INTERNAL MEDICINE

## 2024-05-17 PROCEDURE — 99214 OFFICE O/P EST MOD 30 MIN: CPT | Performed by: INTERNAL MEDICINE

## 2024-05-17 PROCEDURE — 3074F SYST BP LT 130 MM HG: CPT | Performed by: INTERNAL MEDICINE

## 2024-05-17 PROCEDURE — 1123F ACP DISCUSS/DSCN MKR DOCD: CPT | Performed by: INTERNAL MEDICINE

## 2024-05-17 PROCEDURE — G8427 DOCREV CUR MEDS BY ELIG CLIN: HCPCS | Performed by: INTERNAL MEDICINE

## 2024-05-17 PROCEDURE — G8419 CALC BMI OUT NRM PARAM NOF/U: HCPCS | Performed by: INTERNAL MEDICINE

## 2024-05-17 PROCEDURE — 1036F TOBACCO NON-USER: CPT | Performed by: INTERNAL MEDICINE

## 2024-05-17 NOTE — PROGRESS NOTES
Follow up     PCP: Josué Goodrich MD    Chief Complaint   Patient presents with    Low T4     HPI:  Kalina Shah is a 68 y.o. female with  has a past medical history of Allergic conjunctivitis of both eyes, Brain tumor (HCC), Chest pain, Chronic back pain, Cognitive decline, Depression, Essential hypertension, H/O mammogram, Hot flashes, Hot flashes, Hypercholesterolemia, Obstructive sleep apnea, Osteoarthritis of right knee, Osteoarthritis of right knee, Osteoporosis, and Pap smear for cervical cancer screening. Here for follow up     Tfts checked d/t weight gain, neck swelling and fatigue     Prior endo note with dr roger chaudhary 6/2022  \"CT chest in April for significant swelling supra-clavicular area - everyone once in awhile causes pressure on the right side  CT did not show enlarged thyroid gland, but showed a thyroid nodule  No recent TSH level  Mother - goiter - surgery  2 maternal aunts also with goiter  4/8/22 CT Chest w/Con - THYROID: In the left lobe of thyroid gland there is a 9 mm hypodense nodule\"    7/2022 - US neck   Nearly completely cystic left lobe nodule measuring 1.3 cm with a small mural solid component.    9/2022 - CT A/P  Adrenals: The adrenals are normal on noncontrast images.   Pancreas: The pancreas is normal on noncontrast images.     \"Dr Posada in May 2024 for brain mass s/p gamma knife (mass did not shrink so he is thinking it might be a benign mass)\"    Today -   Reviewed thyroid US, DST  No clinical chnage  BRIEF ROS   Gen: no fevers/chills  CV: no chest pain  Resp: no shortness of breath, cough   GI: no nausea, emesis, abdominal pain  Neuro: no confusion     LABS/STUDIES:     No results found for: \"IUR2XSNK\"    Lab Results   Component Value Date/Time    CREATININE 0.57 03/11/2024 01:47 PM    LABGLOM >60 03/11/2024 01:47 PM    LABGLOM >60 04/19/2023 08:57 AM       Lab Results   Component Value Date/Time    CHOL 225 04/27/2022 11:40 AM    TRIG 82 04/27/2022 11:40 AM    HDL

## 2024-05-17 NOTE — PROGRESS NOTES
Kalina Shah is a 68 y.o. female here for   Chief Complaint   Patient presents with    Low T4       1. Have you been to the ER, urgent care clinic since your last visit?  Hospitalized since your last visit? -NO    2. Have you seen or consulted any other health care providers outside of the [facility] since your last visit?  Include any pap smears or colon screening.-Pain Management doctor MAY 13[facility]

## 2024-05-23 DIAGNOSIS — R63.5 ABNORMAL WEIGHT GAIN: ICD-10-CM

## 2024-05-29 DIAGNOSIS — R79.89 LOW T4: ICD-10-CM

## 2024-05-29 DIAGNOSIS — R63.5 ABNORMAL WEIGHT GAIN: ICD-10-CM

## 2024-05-29 LAB — CORTIS AM PEAK SERPL-MCNC: 12 UG/DL (ref 4.3–22.45)

## 2024-05-30 ENCOUNTER — TELEPHONE (OUTPATIENT)
Age: 69
End: 2024-05-30

## 2024-05-30 LAB
T4 FREE SERPL-MCNC: 0.7 NG/DL (ref 0.8–1.5)
TSH SERPL DL<=0.05 MIU/L-ACNC: 6.07 UIU/ML (ref 0.36–3.74)

## 2024-05-30 RX ORDER — LEVOTHYROXINE SODIUM 0.03 MG/1
25 TABLET ORAL DAILY
Qty: 90 TABLET | Refills: 0 | Status: SHIPPED | OUTPATIENT
Start: 2024-05-30

## 2024-05-30 NOTE — TELEPHONE ENCOUNTER
----- Message from Cecilia Hernandez MD sent at 5/30/2024  9:23 AM EDT -----  Please inform   Thyroid level underactive  Start thyroid supplement - levothyroxine 25 mcg daily, rx sent.   Keep appointment and Ill order surveillance labs     take levothyroxine 30-60 minutes before food and at least 4 hours before calcium, iron, multivitamin, and soy.

## 2024-05-30 NOTE — RESULT ENCOUNTER NOTE
Please inform   Thyroid level underactive  Start thyroid supplement - levothyroxine 25 mcg daily, rx sent.   Keep appointment and Ill order surveillance labs     take levothyroxine 30-60 minutes before food and at least 4 hours before calcium, iron, multivitamin, and soy.

## 2024-05-30 NOTE — TELEPHONE ENCOUNTER
Informed pt of Dr. Hernandez's note. Pt verbalized understanding with no further questions or concerns at this time.

## 2024-05-31 LAB
ACTH PLAS-MCNC: 28.5 PG/ML (ref 7.2–63.3)
DHEA-S SERPL-MCNC: 40.6 UG/DL (ref 20.4–186.6)

## 2024-06-03 LAB
COLLECT DURATION TIME UR: 24 HR
CORTIS F 24H UR-MRATE: NORMAL UG/24 HR (ref 6–42)
CORTIS F UR-MCNC: 3 UG/L
CORTIS SAL-MCNC: 0.04 UG/DL
CORTIS SAL-MCNC: 0.04 UG/DL
SPECIMEN VOL ?TM UR: NORMAL ML

## 2024-06-04 ENCOUNTER — TELEPHONE (OUTPATIENT)
Age: 69
End: 2024-06-04

## 2024-06-04 NOTE — TELEPHONE ENCOUNTER
Patient was informed to  thyroid medication and needed repeat labs. Patient went to lab but no orders. Please instruct patient when she is to have repeat labs done just having started medication was unclear if she should wait a period of time before repeating them and to have new orders put in. She uses a [facility] lab

## 2024-06-07 NOTE — PROGRESS NOTES
[id number] [place], [place] [id number]  Andres Son MD Ph:[phone]      TSH, 3rd Generation 05/29/2024 6.07 (H)  0.36 - 3.74 uIU/mL Final    Comment:   Due to TSH heterogeneity, both structurally and degree of glycosylation, monoclonal antibodies used in the TSH assay may not accurately quantitate TSH. Therefore, this result should be correlated with clinical findings as well as with other assessments of thyroid function, e.g., free T4, free T3.      T4 Free 05/29/2024 0.7 (L)  0.8 - 1.5 NG/DL Final   Office Visit on 05/17/2024   Component Date Value Ref Range Status    CORTISOL 05/18/2024 0.041  ug/dL Final    Comment: (NOTE)  This test was developed and its performance characteristics  determined by [facility]. It has not been cleared or approved  by the Food and Drug Administration.  Reference Range:  Children and Adults:  8:00a.m.:   0.025 - 0.600  Noon:      <0.010 - 0.330  4:00p.m.:   0.010 - 0.200  Bedtime (9:00p.m.-Midnight):  <0.010 - 0.090  Performed At: [facility]  [address] [place] [id number]  Mony HOLLIS MD Ph:[phone]      CORTISOL 05/22/2024 0.035  ug/dL Final    Comment: (NOTE)  This test was developed and its performance characteristics  determined by [facility]. It has not been cleared or approved  by the Food and Drug Administration.  Reference Range:  Children and Adults:  8:00a.m.:   0.025 - 0.600  Noon:      <0.010 - 0.330  4:00p.m.:   0.010 - 0.200  Bedtime (9:00p.m.-Midnight):  <0.010 - 0.090  Performed At: [facility]  [address] [place] [id number]  Mony HOLLIS MD Ph:[phone]     Orders Only on 04/04/2024   Component Date Value Ref Range Status    Cortisol - AM 04/04/2024 2.4 (L)  4.30 - 22.45 ug/dL Final   Hospital Outpatient Visit on 03/11/2024   Component Date Value Ref Range Status    Sodium 03/11/2024 137  136 - 145 mmol/L Final    Potassium 03/11/2024 4.3  3.5 - 5.1 mmol/L Final    Chloride 03/11/2024 100  97

## 2024-06-10 ENCOUNTER — OFFICE VISIT (OUTPATIENT)
Age: 69
End: 2024-06-10
Payer: MEDICARE

## 2024-06-10 VITALS
TEMPERATURE: 96 F | HEIGHT: 65 IN | OXYGEN SATURATION: 95 % | HEART RATE: 75 BPM | BODY MASS INDEX: 28.62 KG/M2 | DIASTOLIC BLOOD PRESSURE: 70 MMHG | SYSTOLIC BLOOD PRESSURE: 120 MMHG

## 2024-06-10 DIAGNOSIS — R94.02 ABNORMAL BRAIN SCAN: ICD-10-CM

## 2024-06-10 DIAGNOSIS — G31.84 MILD COGNITIVE IMPAIRMENT: Primary | ICD-10-CM

## 2024-06-10 DIAGNOSIS — R41.89 COGNITIVE IMPAIRMENT: ICD-10-CM

## 2024-06-10 DIAGNOSIS — M46.1 BILATERAL SACROILIITIS (HCC): ICD-10-CM

## 2024-06-10 PROCEDURE — G8419 CALC BMI OUT NRM PARAM NOF/U: HCPCS | Performed by: PSYCHIATRY & NEUROLOGY

## 2024-06-10 PROCEDURE — 3078F DIAST BP <80 MM HG: CPT | Performed by: PSYCHIATRY & NEUROLOGY

## 2024-06-10 PROCEDURE — 3074F SYST BP LT 130 MM HG: CPT | Performed by: PSYCHIATRY & NEUROLOGY

## 2024-06-10 PROCEDURE — 99214 OFFICE O/P EST MOD 30 MIN: CPT | Performed by: PSYCHIATRY & NEUROLOGY

## 2024-06-10 PROCEDURE — 1036F TOBACCO NON-USER: CPT | Performed by: PSYCHIATRY & NEUROLOGY

## 2024-06-10 PROCEDURE — 3017F COLORECTAL CA SCREEN DOC REV: CPT | Performed by: PSYCHIATRY & NEUROLOGY

## 2024-06-10 PROCEDURE — 1090F PRES/ABSN URINE INCON ASSESS: CPT | Performed by: PSYCHIATRY & NEUROLOGY

## 2024-06-10 PROCEDURE — 1123F ACP DISCUSS/DSCN MKR DOCD: CPT | Performed by: PSYCHIATRY & NEUROLOGY

## 2024-06-10 PROCEDURE — G8428 CUR MEDS NOT DOCUMENT: HCPCS | Performed by: PSYCHIATRY & NEUROLOGY

## 2024-06-10 PROCEDURE — G8399 PT W/DXA RESULTS DOCUMENT: HCPCS | Performed by: PSYCHIATRY & NEUROLOGY

## 2024-06-17 ENCOUNTER — OFFICE VISIT (OUTPATIENT)
Age: 69
End: 2024-06-17
Payer: MEDICARE

## 2024-06-17 VITALS
OXYGEN SATURATION: 98 % | WEIGHT: 172 LBS | DIASTOLIC BLOOD PRESSURE: 64 MMHG | HEIGHT: 65 IN | SYSTOLIC BLOOD PRESSURE: 101 MMHG | TEMPERATURE: 98.2 F | BODY MASS INDEX: 28.66 KG/M2 | HEART RATE: 67 BPM

## 2024-06-17 DIAGNOSIS — E03.9 ACQUIRED HYPOTHYROIDISM: ICD-10-CM

## 2024-06-17 DIAGNOSIS — R79.89 LOW T4: ICD-10-CM

## 2024-06-17 DIAGNOSIS — R22.1 ENLARGEMENT OF NECK: Primary | ICD-10-CM

## 2024-06-17 PROCEDURE — 99214 OFFICE O/P EST MOD 30 MIN: CPT | Performed by: INTERNAL MEDICINE

## 2024-06-17 PROCEDURE — 1123F ACP DISCUSS/DSCN MKR DOCD: CPT | Performed by: INTERNAL MEDICINE

## 2024-06-17 PROCEDURE — 3074F SYST BP LT 130 MM HG: CPT | Performed by: INTERNAL MEDICINE

## 2024-06-17 PROCEDURE — 3017F COLORECTAL CA SCREEN DOC REV: CPT | Performed by: INTERNAL MEDICINE

## 2024-06-17 PROCEDURE — G8399 PT W/DXA RESULTS DOCUMENT: HCPCS | Performed by: INTERNAL MEDICINE

## 2024-06-17 PROCEDURE — G8427 DOCREV CUR MEDS BY ELIG CLIN: HCPCS | Performed by: INTERNAL MEDICINE

## 2024-06-17 PROCEDURE — 3078F DIAST BP <80 MM HG: CPT | Performed by: INTERNAL MEDICINE

## 2024-06-17 PROCEDURE — 1090F PRES/ABSN URINE INCON ASSESS: CPT | Performed by: INTERNAL MEDICINE

## 2024-06-17 PROCEDURE — G8419 CALC BMI OUT NRM PARAM NOF/U: HCPCS | Performed by: INTERNAL MEDICINE

## 2024-06-17 PROCEDURE — 1036F TOBACCO NON-USER: CPT | Performed by: INTERNAL MEDICINE

## 2024-06-17 NOTE — PROGRESS NOTES
Follow up     PCP: Josué Goodrich MD    Chief Complaint   Patient presents with    Thyroid Problem     HPI:  Kalina Shah is a 68 y.o. female with  has a past medical history of Allergic conjunctivitis of both eyes, Brain tumor (HCC), Chest pain, Chronic back pain, Cognitive decline, Depression, Essential hypertension, H/O mammogram, Hot flashes, Hot flashes, Hypercholesterolemia, Obstructive sleep apnea, Osteoarthritis of right knee, Osteoarthritis of right knee, Osteoporosis, and Pap smear for cervical cancer screening. Here for follow up     Tfts checked d/t weight gain, neck swelling and fatigue     Prior endo note with dr roger chaudhary 6/2022  \"CT chest in April for significant swelling supra-clavicular area - everyone once in awhile causes pressure on the right side  CT did not show enlarged thyroid gland, but showed a thyroid nodule  No recent TSH level  Mother - goiter - surgery  2 maternal aunts also with goiter  4/8/22 CT Chest w/Con - THYROID: In the left lobe of thyroid gland there is a 9 mm hypodense nodule\"    7/2022 - US neck   Nearly completely cystic left lobe nodule measuring 1.3 cm with a small mural solid component.    9/2022 - CT A/P  Adrenals: The adrenals are normal on noncontrast images.   Pancreas: The pancreas is normal on noncontrast images.     \"Dr Posada in May 2024 for brain mass s/p gamma knife (mass did not shrink so he is thinking it might be a benign mass)\"    Reviewed labs today  Started levothyroxine 25 mcg po daily 5/29/24  No changes to energy levels since started   Saliva cortisol x 2 NL  Acth 28.5, am cortisol 12  Dheas Nl at 40.6     No clinical chnage  BRIEF ROS   Gen: no fevers/chills  CV: no chest pain  Resp: no shortness of breath, cough   GI: no nausea, emesis, abdominal pain  Neuro: no confusion     LABS/STUDIES:     No results found for: \"[id number]\"    Lab Results   Component Value Date/Time    CREATININE 0.57 03/11/2024 01:47 PM    LABGLOM >60 03/11/2024 01:47

## 2024-06-17 NOTE — PROGRESS NOTES
Kalina Shah is a 68 y.o. female here for   Chief Complaint   Patient presents with    Thyroid Problem       1. Have you been to the ER, urgent care clinic since your last visit?  Hospitalized since your last visit? -NO    2. Have you seen or consulted any other health care providers outside of the [facility] since your last visit?  Include any pap smears or colon screening.-Pt saw Pain specialist May 20th

## 2024-06-28 ENCOUNTER — HOSPITAL ENCOUNTER (OUTPATIENT)
Facility: HOSPITAL | Age: 69
Discharge: HOME | End: 2024-06-28
Attending: PSYCHIATRY & NEUROLOGY
Payer: MEDICARE

## 2024-06-28 ENCOUNTER — HOSPITAL ENCOUNTER (OUTPATIENT)
Facility: HOSPITAL | Age: 69
End: 2024-06-28
Attending: PSYCHIATRY & NEUROLOGY
Payer: MEDICARE

## 2024-06-28 DIAGNOSIS — R41.89 COGNITIVE IMPAIRMENT: ICD-10-CM

## 2024-06-28 DIAGNOSIS — R94.02 ABNORMAL BRAIN SCAN: ICD-10-CM

## 2024-06-28 PROCEDURE — 78814 PET IMAGE W/CT LMTD: CPT

## 2024-06-28 PROCEDURE — 6360000002 HC RX W HCPCS: Performed by: PSYCHIATRY & NEUROLOGY

## 2024-06-28 PROCEDURE — 3430000000 HC RX DIAGNOSTIC RADIOPHARMACEUTICAL: Performed by: PSYCHIATRY & NEUROLOGY

## 2024-06-28 PROCEDURE — A9552 F18 FDG: HCPCS | Performed by: PSYCHIATRY & NEUROLOGY

## 2024-06-28 PROCEDURE — A9586 FLORBETAPIR F18: HCPCS | Performed by: PSYCHIATRY & NEUROLOGY

## 2024-06-28 RX ORDER — FLUDEOXYGLUCOSE F-18 500 MCI/ML
10 INJECTION INTRAVENOUS ONCE
Status: DISCONTINUED | OUTPATIENT
Start: 2024-06-28 | End: 2024-07-02 | Stop reason: HOSPADM

## 2024-06-28 RX ADMIN — FLORBETAPIR F 18 10 MILLICURIE: 51 INJECTION, SOLUTION INTRAVENOUS at 09:06

## 2024-07-01 DIAGNOSIS — G25.0 ESSENTIAL TREMOR: ICD-10-CM

## 2024-07-02 RX ORDER — PROPRANOLOL HCL 60 MG
60 CAPSULE, EXTENDED RELEASE 24HR ORAL EVERY MORNING
Qty: 90 CAPSULE | Refills: 3 | Status: SHIPPED | OUTPATIENT
Start: 2024-07-02

## 2024-07-05 ENCOUNTER — HOSPITAL ENCOUNTER (OUTPATIENT)
Facility: HOSPITAL | Age: 69
End: 2024-07-05
Attending: INTERNAL MEDICINE
Payer: MEDICARE

## 2024-07-05 DIAGNOSIS — R22.1 ENLARGEMENT OF NECK: ICD-10-CM

## 2024-07-05 LAB — CREAT BLD-MCNC: 0.6 MG/DL (ref 0.6–1.3)

## 2024-07-05 PROCEDURE — 70492 CT SFT TSUE NCK W/O & W/DYE: CPT

## 2024-07-05 PROCEDURE — 70491 CT SOFT TISSUE NECK W/DYE: CPT

## 2024-07-05 PROCEDURE — 6360000004 HC RX CONTRAST MEDICATION: Performed by: INTERNAL MEDICINE

## 2024-07-05 PROCEDURE — 82565 ASSAY OF CREATININE: CPT

## 2024-07-05 RX ADMIN — IOPAMIDOL 100 ML: 755 INJECTION, SOLUTION INTRAVENOUS at 10:46

## 2024-07-12 ENCOUNTER — TELEPHONE (OUTPATIENT)
Age: 69
End: 2024-07-12

## 2024-07-12 NOTE — TELEPHONE ENCOUNTER
----- Message from Cecilia Hernandez MD sent at 7/12/2024 10:05 AM EDT -----  Please inform  I wanted to share the results of your recent CT scan with you.  1. The scan did not show any suspicious masses or lymph nodes in the soft tissues of your neck. Follow up with PCP for continued concern for neck enlargement.   2. The enhancing mass in your pineal gland has not shown any significant changes in size, follow up with your neurologist for this.

## 2024-07-12 NOTE — RESULT ENCOUNTER NOTE
Please inform  I wanted to share the results of your recent CT scan with you.  1. The scan did not show any suspicious masses or lymph nodes in the soft tissues of your neck. Follow up with PCP for continued concern for neck enlargement.   2. The enhancing mass in your pineal gland has not shown any significant changes in size, follow up with your neurologist for this.

## 2024-07-18 ENCOUNTER — CLINICAL DOCUMENTATION (OUTPATIENT)
Age: 69
End: 2024-07-18

## 2024-07-18 NOTE — PROGRESS NOTES
Called to return phone call regarding patient's inquiry for low blood pressure.    She suffers from chronic back pain and apparently recently has been experiencing some weakness in her legs.  She states that she was seated last week and was unable to get up and when her blood pressure was evaluated it was a little bit low.  She reports her lowest home blood pressures have been around 110/60 and for the most part she feels fine.  Her blood pressure is generally around 120/70.  She is only on low-dose of propranolol and does not want to come off of it because it greatly exacerbates her tremors when she does not take it.  She is not having any orthostatic dizziness and has not had any syncopal spells.  Reinforced importance of staying hydrated.  Advised to reach out to her Ortho/spine doctor soon as possible given progressive lower extremity weakness that could be related to myelopathy.  Discussed S/S of cauda equina syndrome and advised to go to ED if any of these occur.

## 2024-07-22 ENCOUNTER — OFFICE VISIT (OUTPATIENT)
Age: 69
End: 2024-07-22
Payer: MEDICARE

## 2024-07-22 VITALS
WEIGHT: 172 LBS | HEIGHT: 62 IN | OXYGEN SATURATION: 95 % | BODY MASS INDEX: 31.65 KG/M2 | HEART RATE: 66 BPM | SYSTOLIC BLOOD PRESSURE: 104 MMHG | DIASTOLIC BLOOD PRESSURE: 60 MMHG

## 2024-07-22 DIAGNOSIS — E88.1 LIPODYSTROPHY: Primary | ICD-10-CM

## 2024-07-22 DIAGNOSIS — R22.1 NECK SWELLING: ICD-10-CM

## 2024-07-22 PROCEDURE — 3017F COLORECTAL CA SCREEN DOC REV: CPT | Performed by: OTOLARYNGOLOGY

## 2024-07-22 PROCEDURE — 3078F DIAST BP <80 MM HG: CPT | Performed by: OTOLARYNGOLOGY

## 2024-07-22 PROCEDURE — 1090F PRES/ABSN URINE INCON ASSESS: CPT | Performed by: OTOLARYNGOLOGY

## 2024-07-22 PROCEDURE — 3074F SYST BP LT 130 MM HG: CPT | Performed by: OTOLARYNGOLOGY

## 2024-07-22 PROCEDURE — G8427 DOCREV CUR MEDS BY ELIG CLIN: HCPCS | Performed by: OTOLARYNGOLOGY

## 2024-07-22 PROCEDURE — G8419 CALC BMI OUT NRM PARAM NOF/U: HCPCS | Performed by: OTOLARYNGOLOGY

## 2024-07-22 PROCEDURE — 1123F ACP DISCUSS/DSCN MKR DOCD: CPT | Performed by: OTOLARYNGOLOGY

## 2024-07-22 PROCEDURE — G8399 PT W/DXA RESULTS DOCUMENT: HCPCS | Performed by: OTOLARYNGOLOGY

## 2024-07-22 PROCEDURE — 1036F TOBACCO NON-USER: CPT | Performed by: OTOLARYNGOLOGY

## 2024-07-22 PROCEDURE — 99203 OFFICE O/P NEW LOW 30 MIN: CPT | Performed by: OTOLARYNGOLOGY

## 2024-07-22 RX ORDER — PRAVASTATIN SODIUM 40 MG
40 TABLET ORAL
COMMUNITY
Start: 2023-04-18

## 2024-07-22 ASSESSMENT — ENCOUNTER SYMPTOMS
VOICE CHANGE: 0
VOMITING: 0
BACK PAIN: 1
STRIDOR: 0
SINUS PAIN: 0
COUGH: 0
ABDOMINAL PAIN: 0
NAUSEA: 0
EYE DISCHARGE: 0
WHEEZING: 0
TROUBLE SWALLOWING: 0
SINUS PRESSURE: 0
EYE ITCHING: 0
SORE THROAT: 0
APNEA: 0
PHOTOPHOBIA: 0
SHORTNESS OF BREATH: 0
CHOKING: 0

## 2024-07-22 NOTE — PROGRESS NOTES
Subjective:    Kalina Shah   68 y.o.   1955     New Patient Visit    Chief Complaint   Patient presents with    New Patient     Swelling around the neck, referring provider stated she has thyroid problems.       History of Present Illness:  7/22/24 - [facility] office    69 yo female with concerns for bilateral supraclavicular fullness.  Has been around 1 year.  Denies any new meds, no hx steroid use or autoimmune disease.  No concern for HIV disease.  No pain no dysphagia.  She has a known small thyroid nodule, she is on a low-dose of levothyroxine for some hypothyroidism.  She had an ultrasound of the thyroid this year and also a CT scan in July 2024.    Review of Systems  Review of Systems   Constitutional:  Negative for appetite change, chills, fatigue and fever.   HENT:  Negative for congestion, ear discharge, ear pain, nosebleeds, postnasal drip, sinus pressure, sinus pain, sneezing, sore throat, tinnitus, trouble swallowing and voice change.    Eyes:  Negative for photophobia, discharge, itching and visual disturbance.   Respiratory:  Negative for apnea, cough, choking, shortness of breath, wheezing and stridor.    Cardiovascular:  Negative for chest pain and palpitations.   Gastrointestinal:  Negative for abdominal pain, nausea and vomiting.   Endocrine: Negative for cold intolerance and heat intolerance.   Genitourinary:  Negative for difficulty urinating and dysuria.   Musculoskeletal:  Positive for back pain. Negative for arthralgias, gait problem, myalgias, neck pain and neck stiffness.   Skin:  Negative for rash and wound.   Allergic/Immunologic: Negative for environmental allergies and food allergies.   Neurological:  Negative for dizziness, speech difficulty, light-headedness and headaches.   Hematological:  Negative for adenopathy. Does not bruise/bleed easily.   Psychiatric/Behavioral:  Negative for confusion and sleep disturbance. The patient is not nervous/anxious.            Past

## 2024-07-29 ENCOUNTER — OFFICE VISIT (OUTPATIENT)
Facility: CLINIC | Age: 69
End: 2024-07-29
Payer: MEDICARE

## 2024-07-29 VITALS
DIASTOLIC BLOOD PRESSURE: 61 MMHG | BODY MASS INDEX: 28.66 KG/M2 | HEIGHT: 65 IN | SYSTOLIC BLOOD PRESSURE: 97 MMHG | HEART RATE: 64 BPM | TEMPERATURE: 97.3 F | RESPIRATION RATE: 16 BRPM | WEIGHT: 172 LBS | OXYGEN SATURATION: 94 %

## 2024-07-29 DIAGNOSIS — M54.50 CHRONIC BILATERAL LOW BACK PAIN WITHOUT SCIATICA: ICD-10-CM

## 2024-07-29 DIAGNOSIS — G89.29 CHRONIC BILATERAL LOW BACK PAIN WITHOUT SCIATICA: ICD-10-CM

## 2024-07-29 DIAGNOSIS — R29.898 BILATERAL LEG WEAKNESS: ICD-10-CM

## 2024-07-29 DIAGNOSIS — K52.9 CHRONIC DIARRHEA: ICD-10-CM

## 2024-07-29 DIAGNOSIS — K21.9 GASTROESOPHAGEAL REFLUX DISEASE, UNSPECIFIED WHETHER ESOPHAGITIS PRESENT: ICD-10-CM

## 2024-07-29 DIAGNOSIS — K52.9 CHRONIC DIARRHEA: Primary | ICD-10-CM

## 2024-07-29 DIAGNOSIS — G95.9 DISEASE OF SPINAL CORD, UNSPECIFIED (HCC): ICD-10-CM

## 2024-07-29 PROCEDURE — 3074F SYST BP LT 130 MM HG: CPT | Performed by: FAMILY MEDICINE

## 2024-07-29 PROCEDURE — 99215 OFFICE O/P EST HI 40 MIN: CPT | Performed by: FAMILY MEDICINE

## 2024-07-29 PROCEDURE — 1090F PRES/ABSN URINE INCON ASSESS: CPT | Performed by: FAMILY MEDICINE

## 2024-07-29 PROCEDURE — 1123F ACP DISCUSS/DSCN MKR DOCD: CPT | Performed by: FAMILY MEDICINE

## 2024-07-29 PROCEDURE — G8399 PT W/DXA RESULTS DOCUMENT: HCPCS | Performed by: FAMILY MEDICINE

## 2024-07-29 PROCEDURE — 3078F DIAST BP <80 MM HG: CPT | Performed by: FAMILY MEDICINE

## 2024-07-29 PROCEDURE — G8417 CALC BMI ABV UP PARAM F/U: HCPCS | Performed by: FAMILY MEDICINE

## 2024-07-29 PROCEDURE — 1036F TOBACCO NON-USER: CPT | Performed by: FAMILY MEDICINE

## 2024-07-29 PROCEDURE — 3017F COLORECTAL CA SCREEN DOC REV: CPT | Performed by: FAMILY MEDICINE

## 2024-07-29 PROCEDURE — G8427 DOCREV CUR MEDS BY ELIG CLIN: HCPCS | Performed by: FAMILY MEDICINE

## 2024-07-29 NOTE — ASSESSMENT & PLAN NOTE
Return to pain clinic for consideration of epidural injections, talk with neurologist about EMG testing for leg weakness in the setting of known spinal cord disease.  Continue to remain cautious at home avoiding falls, activity as tolerated to maintain current strength utilize walker or cane assistance as needed.

## 2024-07-29 NOTE — PROGRESS NOTES
SUBJECTIVES  with respective ASSESSMENT/PLAN:  Ms. Kalina Shah is a 68 y.o. female established patient who presents for Follow-up (Fu apt.  Pt having indigestion and loose stools for the last few weeks 6 BM's daily. Denies fever chills or abd pains. No real aggravating or relieving factors even with small meals.  /Per daughter: On a separate occasion pt went to bathroom and was not able to lift herself off the toilet.  EMS says she had low blood pressure.  Pt refused transport. ) and Back Pain (Chronic back pain see pain clinic and received injections which helped a little bit. Denies loss of bowel / bladder and no tevin s/s )  , found to have the followin. Chronic diarrhea  Overview:  Background: Approximately 3 to 4 weeks of acute onset upper and lower GI symptoms including nausea vomiting diarrhea.  Nonbloody nonbilious.  Reflux and gastritis symptoms have continued though not as intense.  Having approximately 6 liquid brown stools daily.  Has started noticing lightheadedness and dehydration at times.  Unable to quantify exact oral intake but decreased from before.  Lower blood pressures lately as a result.  Recent sick exposure with longtime friend who had GI troubles and chronic diarrhea now status post unspecified abdominal procedure with hospitalization.  Otherwise no known sick contacts.  No new medications at this time.  No new food sources.  Denies fevers or chills, abdominal pain, bloody stools.  Denies incontinence of urine or stools.  Has not tried any over-the-counter treatments.  Assessment & Plan:  Subacute/chronic diarrhea developing, differential diagnosis includes C. difficile infection or other infectious causes given history of contact with friend, versus malabsorption issues or noninfectious causes such as hypothyroidism.  Medication side effect less likely, levothyroxine, BuSpar, sertraline have been considered.  Recommend hydration with electrolytes, stool testing, if negative

## 2024-07-29 NOTE — PROGRESS NOTES
Chief Complaint   Patient presents with    Follow-up     Fu apt.  Pt having indigestion and loose stools for the last few weeks 6 BM's daily. Denies fever chills or abd pains. No real aggravating or relieving factors even with small meals.    Per daughter: On a separate occasion pt went to bathroom and was not able to lift herself off the toilet.  EMS says she had low blood pressure.  Pt refused transport.     Back Pain     Chronic back pain see pain clinic and received injections which helped a little bit. Denies loss of bowel / bladder and no tevin s/s

## 2024-07-29 NOTE — ASSESSMENT & PLAN NOTE
Subacute/chronic diarrhea developing, differential diagnosis includes C. difficile infection or other infectious causes given history of contact with friend, versus malabsorption issues or noninfectious causes such as hypothyroidism.  Medication side effect less likely, levothyroxine, BuSpar, sertraline have been considered.  Recommend hydration with electrolytes, stool testing, if negative for infectious etiology Imodium and over-the-counter options are available.  Will also treat reflux symptoms after obtaining H. pylori stool antigen testing.  Maintain hydration.  If unable to keep up with oral intake will need to go to the emergency room for IV fluid administration. Monitor Bps.

## 2024-08-05 ENCOUNTER — TELEPHONE (OUTPATIENT)
Age: 69
End: 2024-08-05

## 2024-08-05 DIAGNOSIS — R07.9 CHEST PAIN WITH HIGH RISK FOR CARDIAC ETIOLOGY: ICD-10-CM

## 2024-08-05 RX ORDER — ATORVASTATIN CALCIUM 40 MG/1
40 TABLET, FILM COATED ORAL NIGHTLY
Qty: 90 TABLET | Refills: 1 | Status: SHIPPED | OUTPATIENT
Start: 2024-08-05

## 2024-08-05 RX ORDER — ASPIRIN 81 MG/1
81 TABLET ORAL DAILY
Qty: 90 TABLET | Refills: 1 | Status: SHIPPED | OUTPATIENT
Start: 2024-08-05

## 2024-08-05 NOTE — TELEPHONE ENCOUNTER
Requested Prescriptions     Signed Prescriptions Disp Refills    aspirin 81 MG EC tablet 90 tablet 1     Sig: Take 1 tablet by mouth daily     Authorizing Provider: NAHID AVELAR     Ordering User: MELA DOBSON    atorvastatin (LIPITOR) 40 MG tablet 90 tablet 1     Sig: Take 1 tablet by mouth nightly     Authorizing Provider: NAHID AVELAR     Ordering User: MELA DOBSON      Future Appointments   Date Time Provider Department Center   9/11/2024  1:00 PM [facility] INFUSION NURSE 2 [facility]   9/13/2024 11:45 AM Cecilia Hernandez MD CDE BS [facility]   10/18/2024 11:00 AM Desi Zavala APRN - SHALONDA SDC BS [facility]   11/12/2024  4:00 PM Josué Goodrich MD [facility] DEP   12/9/2024  1:40 PM Ameya Bynum MD NEUROWTCRSPB BS [facility]   3/17/2025  1:40 PM CAMILLE SILVANA MAMMO1 [facility] BS AMB   3/17/2025  2:00 PM CAMILLE SILVANA US1 [facility] BS AMB   3/17/2025  2:40 PM Rizwana Machado MD BSROBG BS [facility]   4/4/2025  9:20 AM Nahid Avelar APRN - NP [facility] BS AMB      Per Verbal Order by MD/NP   Spoke with pt regarding noting that her Atorvastatin was not on her current medication list and had been removed as list cleanup-she stated she does still take this. I notified Nahid Avelar NP and verbal ok to reorder Atorvastatin 40 mg by mouth one nightly.  Pt updated her pharmacy and verbalized understanding.

## 2024-08-12 DIAGNOSIS — K21.9 GASTROESOPHAGEAL REFLUX DISEASE, UNSPECIFIED WHETHER ESOPHAGITIS PRESENT: ICD-10-CM

## 2024-08-12 DIAGNOSIS — D75.89 MACROCYTOSIS WITHOUT ANEMIA: Primary | ICD-10-CM

## 2024-08-12 DIAGNOSIS — R74.8 ELEVATED LIVER ENZYMES: ICD-10-CM

## 2024-08-12 DIAGNOSIS — K52.9 CHRONIC DIARRHEA: ICD-10-CM

## 2024-08-13 LAB
ALBUMIN SERPL-MCNC: 3.1 G/DL (ref 3.5–5)
ALBUMIN/GLOB SERPL: 0.9 (ref 1.1–2.2)
ALP SERPL-CCNC: 141 U/L (ref 45–117)
ALT SERPL-CCNC: 43 U/L (ref 12–78)
ANION GAP SERPL CALC-SCNC: 8 MMOL/L (ref 5–15)
AST SERPL-CCNC: 49 U/L (ref 15–37)
BASOPHILS # BLD: 0 K/UL (ref 0–0.1)
BASOPHILS NFR BLD: 1 % (ref 0–1)
BILIRUB SERPL-MCNC: 0.7 MG/DL (ref 0.2–1)
BUN SERPL-MCNC: 7 MG/DL (ref 6–20)
BUN/CREAT SERPL: 12 (ref 12–20)
C DIFF GDH STL QL: NEGATIVE
C DIFF TOX A+B STL QL IA: NEGATIVE
C DIFF TOXIN INTERPRETATION: NORMAL
CALCIUM SERPL-MCNC: 8.9 MG/DL (ref 8.5–10.1)
CHLORIDE SERPL-SCNC: 105 MMOL/L (ref 97–108)
CO2 SERPL-SCNC: 26 MMOL/L (ref 21–32)
CREAT SERPL-MCNC: 0.57 MG/DL (ref 0.55–1.02)
CRP SERPL-MCNC: <0.29 MG/DL (ref 0–0.3)
CRYPTOSP AG STL QL: NEGATIVE
DIFFERENTIAL METHOD BLD: ABNORMAL
EOSINOPHIL # BLD: 0.1 K/UL (ref 0–0.4)
EOSINOPHIL NFR BLD: 1 % (ref 0–7)
ERYTHROCYTE [DISTWIDTH] IN BLOOD BY AUTOMATED COUNT: 14.3 % (ref 11.5–14.5)
G LAMBLIA AG STL QL: NEGATIVE
GLOBULIN SER CALC-MCNC: 3.5 G/DL (ref 2–4)
GLUCOSE SERPL-MCNC: 113 MG/DL (ref 65–100)
HCT VFR BLD AUTO: 39.5 % (ref 35–47)
HGB BLD-MCNC: 12.8 G/DL (ref 11.5–16)
IMM GRANULOCYTES # BLD AUTO: 0 K/UL (ref 0–0.04)
IMM GRANULOCYTES NFR BLD AUTO: 1 % (ref 0–0.5)
LYMPHOCYTES # BLD: 1 K/UL (ref 0.8–3.5)
LYMPHOCYTES NFR BLD: 20 % (ref 12–49)
MCH RBC QN AUTO: 34.8 PG (ref 26–34)
MCHC RBC AUTO-ENTMCNC: 32.4 G/DL (ref 30–36.5)
MCV RBC AUTO: 107.3 FL (ref 80–99)
MONOCYTES # BLD: 0.6 K/UL (ref 0–1)
MONOCYTES NFR BLD: 13 % (ref 5–13)
NEUTS SEG # BLD: 3.2 K/UL (ref 1.8–8)
NEUTS SEG NFR BLD: 64 % (ref 32–75)
NRBC # BLD: 0 K/UL (ref 0–0.01)
NRBC BLD-RTO: 0 PER 100 WBC
PLATELET # BLD AUTO: 248 K/UL (ref 150–400)
PMV BLD AUTO: 11.2 FL (ref 8.9–12.9)
POTASSIUM SERPL-SCNC: 4.4 MMOL/L (ref 3.5–5.1)
PROT SERPL-MCNC: 6.6 G/DL (ref 6.4–8.2)
RBC # BLD AUTO: 3.68 M/UL (ref 3.8–5.2)
SODIUM SERPL-SCNC: 139 MMOL/L (ref 136–145)
WBC # BLD AUTO: 5 K/UL (ref 3.6–11)
WBC #/AREA STL HPF: NORMAL /HPF (ref 0–4)

## 2024-08-15 LAB
ENDOMYSIUM IGA SER QL: NEGATIVE
IGA SERPL-MCNC: 252 MG/DL (ref 87–352)
O+P SPEC MICRO: NORMAL
O+P STL CONC: NORMAL
SPECIMEN SOURCE: NORMAL
TTG IGA SER-ACNC: <2 U/ML (ref 0–3)

## 2024-08-16 LAB
H PYLORI AG STL QL IA: NEGATIVE
SPECIMEN SOURCE: NORMAL

## 2024-08-18 LAB
CALPROTECTIN STL-MCNT: 24 UG/G (ref 0–120)
ELASTASE PANC STL-MCNT: 300 UG ELAST./G

## 2024-08-20 LAB
FAT STL QL: NORMAL
NEUTRAL FAT STL QL: NORMAL

## 2024-08-21 ENCOUNTER — TELEPHONE (OUTPATIENT)
Facility: CLINIC | Age: 69
End: 2024-08-21

## 2024-08-21 LAB — SPECIMEN STATUS REPORT: NORMAL

## 2024-08-21 NOTE — TELEPHONE ENCOUNTER
Lab Olya called in regards to pt's stool sample.     No pink/grey top tube was received, so an Ova and parasites will not be ran.

## 2024-08-22 PROBLEM — D75.89 MACROCYTOSIS WITHOUT ANEMIA: Status: ACTIVE | Noted: 2024-08-22

## 2024-08-23 LAB
C DIFF TOX A+B STL QL IA: NEGATIVE
H PYLORI AG STL QL IA: NEGATIVE
SPECIMEN STATUS REPORT: NORMAL

## 2024-08-27 ENCOUNTER — TELEPHONE (OUTPATIENT)
Facility: CLINIC | Age: 69
End: 2024-08-27

## 2024-08-27 DIAGNOSIS — E78.00 HYPERCHOLESTEROLEMIA: Primary | ICD-10-CM

## 2024-08-30 DIAGNOSIS — E03.9 ACQUIRED HYPOTHYROIDISM: Primary | ICD-10-CM

## 2024-09-03 RX ORDER — LEVOTHYROXINE SODIUM 25 UG/1
25 TABLET ORAL DAILY
Qty: 90 TABLET | Refills: 3 | Status: SHIPPED | OUTPATIENT
Start: 2024-09-03

## 2024-09-10 DIAGNOSIS — E03.9 ACQUIRED HYPOTHYROIDISM: ICD-10-CM

## 2024-09-11 ENCOUNTER — HOSPITAL ENCOUNTER (OUTPATIENT)
Facility: HOSPITAL | Age: 69
Setting detail: INFUSION SERIES
Discharge: HOME | End: 2024-09-11
Payer: MEDICARE

## 2024-09-11 ENCOUNTER — TELEPHONE (OUTPATIENT)
Facility: CLINIC | Age: 69
End: 2024-09-11

## 2024-09-11 VITALS
SYSTOLIC BLOOD PRESSURE: 84 MMHG | HEART RATE: 63 BPM | RESPIRATION RATE: 18 BRPM | TEMPERATURE: 98.2 F | DIASTOLIC BLOOD PRESSURE: 61 MMHG | OXYGEN SATURATION: 96 %

## 2024-09-11 DIAGNOSIS — M81.0 OSTEOPOROSIS, UNSPECIFIED OSTEOPOROSIS TYPE, UNSPECIFIED PATHOLOGICAL FRACTURE PRESENCE: Primary | ICD-10-CM

## 2024-09-11 LAB
ALBUMIN SERPL-MCNC: 2.6 G/DL (ref 3.5–5)
ANION GAP SERPL CALC-SCNC: 13 MMOL/L (ref 2–12)
BUN SERPL-MCNC: 3 MG/DL (ref 6–20)
BUN/CREAT SERPL: 6 (ref 12–20)
CALCIUM SERPL-MCNC: 8.5 MG/DL (ref 8.5–10.1)
CHLORIDE SERPL-SCNC: 105 MMOL/L (ref 97–108)
CO2 SERPL-SCNC: 23 MMOL/L (ref 21–32)
CREAT SERPL-MCNC: 0.47 MG/DL (ref 0.55–1.02)
GLUCOSE SERPL-MCNC: 123 MG/DL (ref 65–100)
PHOSPHATE SERPL-MCNC: 3.2 MG/DL (ref 2.6–4.7)
POTASSIUM SERPL-SCNC: 4 MMOL/L (ref 3.5–5.1)
SODIUM SERPL-SCNC: 141 MMOL/L (ref 136–145)
T4 FREE SERPL-MCNC: 0.8 NG/DL (ref 0.8–1.5)
TSH SERPL DL<=0.05 MIU/L-ACNC: 1.57 UIU/ML (ref 0.36–3.74)

## 2024-09-11 PROCEDURE — 6360000002 HC RX W HCPCS: Performed by: FAMILY MEDICINE

## 2024-09-11 PROCEDURE — 36415 COLL VENOUS BLD VENIPUNCTURE: CPT

## 2024-09-11 PROCEDURE — 96372 THER/PROPH/DIAG INJ SC/IM: CPT

## 2024-09-11 PROCEDURE — 80069 RENAL FUNCTION PANEL: CPT

## 2024-09-11 RX ADMIN — DENOSUMAB 60 MG: 60 INJECTION SUBCUTANEOUS at 14:02

## 2024-09-12 ENCOUNTER — OFFICE VISIT (OUTPATIENT)
Facility: CLINIC | Age: 69
End: 2024-09-12

## 2024-09-12 VITALS
SYSTOLIC BLOOD PRESSURE: 114 MMHG | RESPIRATION RATE: 16 BRPM | BODY MASS INDEX: 28.32 KG/M2 | OXYGEN SATURATION: 95 % | HEART RATE: 60 BPM | DIASTOLIC BLOOD PRESSURE: 75 MMHG | WEIGHT: 170 LBS | TEMPERATURE: 97.2 F | HEIGHT: 65 IN

## 2024-09-12 DIAGNOSIS — E88.09 HYPOALBUMINEMIA: ICD-10-CM

## 2024-09-12 DIAGNOSIS — G44.209 TENSION HEADACHE: ICD-10-CM

## 2024-09-12 DIAGNOSIS — M54.50 CHRONIC BILATERAL LOW BACK PAIN WITHOUT SCIATICA: ICD-10-CM

## 2024-09-12 DIAGNOSIS — G89.29 CHRONIC BILATERAL LOW BACK PAIN WITHOUT SCIATICA: ICD-10-CM

## 2024-09-12 DIAGNOSIS — I95.9 HYPOTENSION, UNSPECIFIED HYPOTENSION TYPE: Primary | ICD-10-CM

## 2024-09-12 RX ORDER — DICLOFENAC SODIUM 75 MG/1
75 TABLET, DELAYED RELEASE ORAL 2 TIMES DAILY
COMMUNITY

## 2024-09-13 ENCOUNTER — OFFICE VISIT (OUTPATIENT)
Age: 69
End: 2024-09-13
Payer: MEDICARE

## 2024-09-13 VITALS
WEIGHT: 168.4 LBS | SYSTOLIC BLOOD PRESSURE: 94 MMHG | HEIGHT: 65 IN | HEART RATE: 63 BPM | OXYGEN SATURATION: 91 % | DIASTOLIC BLOOD PRESSURE: 62 MMHG | BODY MASS INDEX: 28.06 KG/M2 | TEMPERATURE: 97.3 F

## 2024-09-13 DIAGNOSIS — E04.1 THYROID CYST: ICD-10-CM

## 2024-09-13 DIAGNOSIS — E03.9 ACQUIRED HYPOTHYROIDISM: Primary | ICD-10-CM

## 2024-09-13 DIAGNOSIS — R22.1 ENLARGEMENT OF NECK: ICD-10-CM

## 2024-09-13 PROCEDURE — G8417 CALC BMI ABV UP PARAM F/U: HCPCS | Performed by: INTERNAL MEDICINE

## 2024-09-13 PROCEDURE — 3017F COLORECTAL CA SCREEN DOC REV: CPT | Performed by: INTERNAL MEDICINE

## 2024-09-13 PROCEDURE — 3078F DIAST BP <80 MM HG: CPT | Performed by: INTERNAL MEDICINE

## 2024-09-13 PROCEDURE — 1036F TOBACCO NON-USER: CPT | Performed by: INTERNAL MEDICINE

## 2024-09-13 PROCEDURE — 99214 OFFICE O/P EST MOD 30 MIN: CPT | Performed by: INTERNAL MEDICINE

## 2024-09-13 PROCEDURE — 1090F PRES/ABSN URINE INCON ASSESS: CPT | Performed by: INTERNAL MEDICINE

## 2024-09-13 PROCEDURE — 3074F SYST BP LT 130 MM HG: CPT | Performed by: INTERNAL MEDICINE

## 2024-09-13 PROCEDURE — G8399 PT W/DXA RESULTS DOCUMENT: HCPCS | Performed by: INTERNAL MEDICINE

## 2024-09-13 PROCEDURE — G8428 CUR MEDS NOT DOCUMENT: HCPCS | Performed by: INTERNAL MEDICINE

## 2024-09-13 PROCEDURE — 1123F ACP DISCUSS/DSCN MKR DOCD: CPT | Performed by: INTERNAL MEDICINE

## 2024-10-30 ENCOUNTER — TELEPHONE (OUTPATIENT)
Facility: CLINIC | Age: 69
End: 2024-10-30

## 2024-10-30 NOTE — TELEPHONE ENCOUNTER
Lab orders placed 08/22/2024.    Can we call pt, and ask that she have fasting labs drawn prior to scheduled appointment.

## 2024-10-31 ENCOUNTER — TELEPHONE (OUTPATIENT)
Facility: CLINIC | Age: 69
End: 2024-10-31

## 2024-10-31 NOTE — TELEPHONE ENCOUNTER
Physical Therapist called to advised pt missed PT session on 10/25/204.  
Plan: Recommend further evaluation with hand orthopedic surgeon for permanent removal
Detail Level: Detailed

## 2024-11-07 DIAGNOSIS — D75.89 MACROCYTOSIS WITHOUT ANEMIA: ICD-10-CM

## 2024-11-07 DIAGNOSIS — E88.09 HYPOALBUMINEMIA: ICD-10-CM

## 2024-11-07 DIAGNOSIS — R74.8 ELEVATED LIVER ENZYMES: ICD-10-CM

## 2024-11-07 DIAGNOSIS — E78.00 HYPERCHOLESTEROLEMIA: ICD-10-CM

## 2024-11-08 LAB
ALBUMIN SERPL-MCNC: 3.4 G/DL (ref 3.5–5)
ALBUMIN/GLOB SERPL: 1 (ref 1.1–2.2)
ALP SERPL-CCNC: 130 U/L (ref 45–117)
ALT SERPL-CCNC: 40 U/L (ref 12–78)
ANION GAP SERPL CALC-SCNC: 8 MMOL/L (ref 2–12)
APPEARANCE UR: CLEAR
AST SERPL-CCNC: 48 U/L (ref 15–37)
BACTERIA URNS QL MICRO: ABNORMAL /HPF
BASOPHILS # BLD: 0.1 K/UL (ref 0–0.1)
BASOPHILS NFR BLD: 1 % (ref 0–1)
BILIRUB SERPL-MCNC: 0.8 MG/DL (ref 0.2–1)
BILIRUB UR QL: NEGATIVE
BUN SERPL-MCNC: 5 MG/DL (ref 6–20)
BUN/CREAT SERPL: 8 (ref 12–20)
CALCIUM SERPL-MCNC: 9.7 MG/DL (ref 8.5–10.1)
CHLORIDE SERPL-SCNC: 109 MMOL/L (ref 97–108)
CHOLEST SERPL-MCNC: 199 MG/DL
CO2 SERPL-SCNC: 24 MMOL/L (ref 21–32)
COLOR UR: ABNORMAL
CREAT SERPL-MCNC: 0.61 MG/DL (ref 0.55–1.02)
DIFFERENTIAL METHOD BLD: ABNORMAL
EOSINOPHIL # BLD: 0.3 K/UL (ref 0–0.4)
EOSINOPHIL NFR BLD: 5 % (ref 0–7)
EPITH CASTS URNS QL MICRO: ABNORMAL /LPF
ERYTHROCYTE [DISTWIDTH] IN BLOOD BY AUTOMATED COUNT: 12.8 % (ref 11.5–14.5)
GGT SERPL-CCNC: 253 U/L (ref 5–55)
GLOBULIN SER CALC-MCNC: 3.4 G/DL (ref 2–4)
GLUCOSE SERPL-MCNC: 101 MG/DL (ref 65–100)
GLUCOSE UR STRIP.AUTO-MCNC: NEGATIVE MG/DL
HCT VFR BLD AUTO: 38 % (ref 35–47)
HDLC SERPL-MCNC: 77 MG/DL
HDLC SERPL: 2.6 (ref 0–5)
HGB BLD-MCNC: 12.3 G/DL (ref 11.5–16)
HGB UR QL STRIP: NEGATIVE
IMM GRANULOCYTES # BLD AUTO: 0 K/UL (ref 0–0.04)
IMM GRANULOCYTES NFR BLD AUTO: 0 % (ref 0–0.5)
KETONES UR QL STRIP.AUTO: NEGATIVE MG/DL
LDLC SERPL CALC-MCNC: 90.8 MG/DL (ref 0–100)
LEUKOCYTE ESTERASE UR QL STRIP.AUTO: ABNORMAL
LYMPHOCYTES # BLD: 1.6 K/UL (ref 0.8–3.5)
LYMPHOCYTES NFR BLD: 29 % (ref 12–49)
MCH RBC QN AUTO: 33.8 PG (ref 26–34)
MCHC RBC AUTO-ENTMCNC: 32.4 G/DL (ref 30–36.5)
MCV RBC AUTO: 104.4 FL (ref 80–99)
MONOCYTES # BLD: 0.7 K/UL (ref 0–1)
MONOCYTES NFR BLD: 12 % (ref 5–13)
NEUTS SEG # BLD: 3 K/UL (ref 1.8–8)
NEUTS SEG NFR BLD: 53 % (ref 32–75)
NITRITE UR QL STRIP.AUTO: NEGATIVE
NRBC # BLD: 0 K/UL (ref 0–0.01)
NRBC BLD-RTO: 0 PER 100 WBC
PERIPHERAL SMEAR, MD REVIEW: NORMAL
PH UR STRIP: 7.5 (ref 5–8)
PLATELET # BLD AUTO: 384 K/UL (ref 150–400)
PMV BLD AUTO: 10.8 FL (ref 8.9–12.9)
POTASSIUM SERPL-SCNC: 4.2 MMOL/L (ref 3.5–5.1)
PROT SERPL-MCNC: 6.8 G/DL (ref 6.4–8.2)
PROT UR STRIP-MCNC: NEGATIVE MG/DL
RBC # BLD AUTO: 3.64 M/UL (ref 3.8–5.2)
RBC #/AREA URNS HPF: ABNORMAL /HPF (ref 0–5)
SODIUM SERPL-SCNC: 141 MMOL/L (ref 136–145)
SP GR UR REFRACTOMETRY: 1.01 (ref 1–1.03)
TRIGL SERPL-MCNC: 156 MG/DL
URINE CULTURE IF INDICATED: ABNORMAL
UROBILINOGEN UR QL STRIP.AUTO: 0.2 EU/DL (ref 0.2–1)
VLDLC SERPL CALC-MCNC: 31.2 MG/DL
WBC # BLD AUTO: 5.6 K/UL (ref 3.6–11)
WBC URNS QL MICRO: ABNORMAL /HPF (ref 0–4)

## 2024-11-12 ENCOUNTER — OFFICE VISIT (OUTPATIENT)
Facility: CLINIC | Age: 69
End: 2024-11-12

## 2024-11-12 VITALS
RESPIRATION RATE: 16 BRPM | WEIGHT: 168 LBS | SYSTOLIC BLOOD PRESSURE: 115 MMHG | HEART RATE: 81 BPM | HEIGHT: 62 IN | DIASTOLIC BLOOD PRESSURE: 82 MMHG | BODY MASS INDEX: 30.91 KG/M2 | OXYGEN SATURATION: 97 %

## 2024-11-12 DIAGNOSIS — E53.8 VITAMIN B12 DEFICIENCY: ICD-10-CM

## 2024-11-12 DIAGNOSIS — G25.0 ESSENTIAL TREMOR: Primary | ICD-10-CM

## 2024-11-12 DIAGNOSIS — F33.1 MODERATE EPISODE OF RECURRENT MAJOR DEPRESSIVE DISORDER (HCC): ICD-10-CM

## 2024-11-12 DIAGNOSIS — N64.4 BREAST TENDERNESS: ICD-10-CM

## 2024-11-12 DIAGNOSIS — D75.89 MACROCYTOSIS WITHOUT ANEMIA: ICD-10-CM

## 2024-11-12 DIAGNOSIS — F41.9 ANXIETY: ICD-10-CM

## 2024-11-12 DIAGNOSIS — M81.0 OSTEOPOROSIS, UNSPECIFIED OSTEOPOROSIS TYPE, UNSPECIFIED PATHOLOGICAL FRACTURE PRESENCE: ICD-10-CM

## 2024-11-12 DIAGNOSIS — M54.40 ACUTE LEFT-SIDED LOW BACK PAIN WITH SCIATICA, SCIATICA LATERALITY UNSPECIFIED: ICD-10-CM

## 2024-11-12 DIAGNOSIS — I10 ESSENTIAL HYPERTENSION: ICD-10-CM

## 2024-11-12 DIAGNOSIS — F10.90 ALCOHOL USE DISORDER: ICD-10-CM

## 2024-11-12 DIAGNOSIS — K70.0 ALCOHOL INDUCED FATTY LIVER: ICD-10-CM

## 2024-11-12 DIAGNOSIS — K52.9 CHRONIC DIARRHEA: ICD-10-CM

## 2024-11-12 PROBLEM — G95.9 DISEASE OF SPINAL CORD, UNSPECIFIED (HCC): Status: RESOLVED | Noted: 2023-09-29 | Resolved: 2024-11-12

## 2024-11-12 LAB
ALP BONE CFR SERPL: 34 % (ref 14–68)
ALP INTEST CFR SERPL: 0 % (ref 0–18)
ALP LIVER CFR SERPL: 66 % (ref 18–85)
ALP SERPL-CCNC: 135 IU/L (ref 44–121)

## 2024-11-12 RX ORDER — ABALOPARATIDE 2000 UG/ML
80 INJECTION, SOLUTION SUBCUTANEOUS DAILY
COMMUNITY
Start: 2024-10-30 | End: 2024-12-11

## 2024-11-12 RX ORDER — GABAPENTIN 300 MG/1
300 CAPSULE ORAL 3 TIMES DAILY
COMMUNITY
Start: 2024-11-12

## 2024-11-12 RX ORDER — NALTREXONE HYDROCHLORIDE 50 MG/1
50 TABLET, FILM COATED ORAL DAILY
COMMUNITY
Start: 2024-11-11 | End: 2024-12-11

## 2024-11-12 SDOH — ECONOMIC STABILITY: FOOD INSECURITY: WITHIN THE PAST 12 MONTHS, YOU WORRIED THAT YOUR FOOD WOULD RUN OUT BEFORE YOU GOT MONEY TO BUY MORE.: NEVER TRUE

## 2024-11-12 SDOH — ECONOMIC STABILITY: FOOD INSECURITY: WITHIN THE PAST 12 MONTHS, THE FOOD YOU BOUGHT JUST DIDN'T LAST AND YOU DIDN'T HAVE MONEY TO GET MORE.: NEVER TRUE

## 2024-11-12 SDOH — ECONOMIC STABILITY: INCOME INSECURITY: HOW HARD IS IT FOR YOU TO PAY FOR THE VERY BASICS LIKE FOOD, HOUSING, MEDICAL CARE, AND HEATING?: NOT HARD AT ALL

## 2024-11-12 NOTE — ADDENDUM NOTE
Addended by: MARGE DANG on: 11/12/2024 05:05 PM     Modules accepted: Orders     Rx left at .  Roxanna TRAYLOR

## 2024-11-12 NOTE — PROGRESS NOTES
Assessment & Plan  1. Essential tremors.  She has essential tremors primarily in her hands. Given her established relationship with neurosurgeon, it would be beneficial for her to discuss the possibility of being a candidate for ultrasound stimulation of the brain to manage her hand tremors. She was advised to consult with neurosurgeon, and if he determines she is not a candidate, alternative medications will be considered.    2. Hypertension.  Her hypertension is well-managed, and she has gradually been weaned off her blood pressure medications. She is not experiencing dizziness or lightheadedness.    3. Alcohol use disorder.  Her alkaline phosphatase levels have been slightly elevated, and her protein levels have been slightly low. Her AST was higher than her ALT. These patterns are consistent with alcohol-induced damage to the liver and bone marrow. Her CT scan from 10/2024 revealed diffuse hepatic steatosis, likely alcohol-induced fatty liver. Her gallbladder appeared normal with no signs of biliary duct dilation or obvious stones. She does not exhibit a wide wobbly gait. She was advised to continue her current treatments, including naltrexone 50 mg daily and gabapentin 300 mg three times a day. A multivitamin with folate, B12, and thiamine was recommended. She was also advised to consume iron-rich foods.    4. Chronic diarrhea.  Her diarrhea has resolved after switching from magnesium oxide to magnesium glycinate.    5. Right breast lump.  The lump could be a hematoma or ruptured fibroglandular cyst tissue, both of which are inflammatory. As long as it continues to improve and eventually disappears, it is unlikely to be cancerous. Her last breast imaging in 03/2024 showed scattered areas of fibroglandular density. She was advised to inform her OB/GYN about the lump during her mammogram in 01/2025 and request a tomocentesis. If the lump stabilizes and does not improve after a couple of months, she should inform
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
## 2024-12-06 ENCOUNTER — TELEPHONE (OUTPATIENT)
Age: 69
End: 2024-12-06

## 2024-12-06 DIAGNOSIS — F33.1 MODERATE EPISODE OF RECURRENT MAJOR DEPRESSIVE DISORDER (HCC): ICD-10-CM

## 2024-12-06 DIAGNOSIS — F41.9 ANXIETY: ICD-10-CM

## 2024-12-06 DIAGNOSIS — R07.9 CHEST PAIN WITH HIGH RISK FOR CARDIAC ETIOLOGY: ICD-10-CM

## 2024-12-06 RX ORDER — ATORVASTATIN CALCIUM 40 MG/1
40 TABLET, FILM COATED ORAL NIGHTLY
Qty: 90 TABLET | Refills: 1 | Status: SHIPPED | OUTPATIENT
Start: 2024-12-06

## 2024-12-06 RX ORDER — BUSPIRONE HYDROCHLORIDE 10 MG/1
10 TABLET ORAL 2 TIMES DAILY
Qty: 180 TABLET | Refills: 3 | Status: SHIPPED | OUTPATIENT
Start: 2024-12-06

## 2024-12-06 RX ORDER — ASPIRIN 81 MG/1
81 TABLET ORAL DAILY
Qty: 90 TABLET | Refills: 1 | Status: SHIPPED | OUTPATIENT
Start: 2024-12-06

## 2024-12-06 NOTE — TELEPHONE ENCOUNTER
Refill needed:    atorvastatin (LIPITOR) 40 MG tablet    aspirin 81 MG EC tablet      Thanks

## 2024-12-10 ENCOUNTER — CLINICAL DOCUMENTATION (OUTPATIENT)
Age: 69
End: 2024-12-10

## 2024-12-10 ENCOUNTER — OFFICE VISIT (OUTPATIENT)
Age: 69
End: 2024-12-10
Payer: MEDICARE

## 2024-12-10 VITALS
WEIGHT: 166.8 LBS | DIASTOLIC BLOOD PRESSURE: 75 MMHG | HEART RATE: 78 BPM | SYSTOLIC BLOOD PRESSURE: 111 MMHG | BODY MASS INDEX: 30.69 KG/M2 | OXYGEN SATURATION: 94 % | TEMPERATURE: 98 F | HEIGHT: 62 IN

## 2024-12-10 DIAGNOSIS — G47.33 OBSTRUCTIVE SLEEP APNEA (ADULT) (PEDIATRIC): Primary | ICD-10-CM

## 2024-12-10 DIAGNOSIS — I10 ESSENTIAL HYPERTENSION: ICD-10-CM

## 2024-12-10 PROCEDURE — 1036F TOBACCO NON-USER: CPT | Performed by: INTERNAL MEDICINE

## 2024-12-10 PROCEDURE — G8417 CALC BMI ABV UP PARAM F/U: HCPCS | Performed by: INTERNAL MEDICINE

## 2024-12-10 PROCEDURE — G8484 FLU IMMUNIZE NO ADMIN: HCPCS | Performed by: INTERNAL MEDICINE

## 2024-12-10 PROCEDURE — 1090F PRES/ABSN URINE INCON ASSESS: CPT | Performed by: INTERNAL MEDICINE

## 2024-12-10 PROCEDURE — 3017F COLORECTAL CA SCREEN DOC REV: CPT | Performed by: INTERNAL MEDICINE

## 2024-12-10 PROCEDURE — 3074F SYST BP LT 130 MM HG: CPT | Performed by: INTERNAL MEDICINE

## 2024-12-10 PROCEDURE — G8399 PT W/DXA RESULTS DOCUMENT: HCPCS | Performed by: INTERNAL MEDICINE

## 2024-12-10 PROCEDURE — 1159F MED LIST DOCD IN RCRD: CPT | Performed by: INTERNAL MEDICINE

## 2024-12-10 PROCEDURE — 99214 OFFICE O/P EST MOD 30 MIN: CPT | Performed by: INTERNAL MEDICINE

## 2024-12-10 PROCEDURE — 3078F DIAST BP <80 MM HG: CPT | Performed by: INTERNAL MEDICINE

## 2024-12-10 PROCEDURE — G8427 DOCREV CUR MEDS BY ELIG CLIN: HCPCS | Performed by: INTERNAL MEDICINE

## 2024-12-10 PROCEDURE — 1160F RVW MEDS BY RX/DR IN RCRD: CPT | Performed by: INTERNAL MEDICINE

## 2024-12-10 PROCEDURE — 1123F ACP DISCUSS/DSCN MKR DOCD: CPT | Performed by: INTERNAL MEDICINE

## 2024-12-10 ASSESSMENT — SLEEP AND FATIGUE QUESTIONNAIRES
HOW LIKELY ARE YOU TO NOD OFF OR FALL ASLEEP WHILE SITTING QUIETLY AFTER LUNCH WITHOUT ALCOHOL: WOULD NEVER DOZE
HOW LIKELY ARE YOU TO NOD OFF OR FALL ASLEEP WHILE LYING DOWN TO REST IN THE AFTERNOON WHEN CIRCUMSTANCES PERMIT: MODERATE CHANCE OF DOZING
HOW LIKELY ARE YOU TO NOD OFF OR FALL ASLEEP WHILE SITTING INACTIVE IN A PUBLIC PLACE: WOULD NEVER DOZE
HOW LIKELY ARE YOU TO NOD OFF OR FALL ASLEEP WHILE WATCHING TV: MODERATE CHANCE OF DOZING
ESS TOTAL SCORE: 4
HOW LIKELY ARE YOU TO NOD OFF OR FALL ASLEEP WHEN YOU ARE A PASSENGER IN A CAR FOR AN HOUR WITHOUT A BREAK: WOULD NEVER DOZE
HOW LIKELY ARE YOU TO NOD OFF OR FALL ASLEEP WHILE SITTING AND TALKING TO SOMEONE: WOULD NEVER DOZE
HOW LIKELY ARE YOU TO NOD OFF OR FALL ASLEEP WHILE SITTING AND READING: WOULD NEVER DOZE
HOW LIKELY ARE YOU TO NOD OFF OR FALL ASLEEP IN A CAR, WHILE STOPPED FOR A FEW MINUTES IN TRAFFIC: WOULD NEVER DOZE

## 2024-12-10 NOTE — PROGRESS NOTES
[address]  [place] [address]  Tel.  [phone]  Fax. [phone] [address]  [place] [address]  Tel.  [phone]  Fax. [phone] [address] [place] [address]  Tel.  [phone]  Fax. [phone]     S>Kalina Shah is a 69 y.o. female seen for a positive airway pressure follow-up.  She reports no problems using the device.  The following problems are identified:    Drowsiness no Problems exhaling no   Snoring no Forget to put on no   Mask Comfortable yes Can't fall asleep no   Dry Mouth no Mask falls off no   Air Leaking no Frequent awakenings no     Estimated AHI flow from download shows 0.9/hour.   recent significant leak  Averaging 8.5/hours use on nights used  Days with usage 100%  Adherence 97%  Weight from last visit 165 lb      She admits that her sleep has improved.     Allergies   Allergen Reactions    Venlafaxine Other (See Comments)     jittery       She has a current medication list which includes the following prescription(s): buspirone, aspirin, atorvastatin, tymlos, magnesium, naltrexone, gabapentin, diclofenac, levothyroxine, albuterol sulfate, vitamin b-12, diclofenac sodium, sertraline, nitroglycerin, and calcium carb-cholecalciferol..      She  has a past medical history of Allergic conjunctivitis of both eyes, Brain tumor (HCC), Chest pain, Chronic back pain, Cognitive decline, Depression, Essential hypertension, Facial laceration, H/O mammogram, Hot flashes, Hot flashes, Hypercholesterolemia, Obstructive sleep apnea, Osteoarthritis of right knee, Osteoarthritis of right knee, Osteoporosis, Pap smear for cervical cancer screening, and Thyroid condition.        12/10/2024     2:58 PM 10/17/2023     2:40 PM 6/13/2023    11:04 AM   Sleep Medicine   Sitting and reading 0 1 1   Watching TV 2 1 1   Sitting, inactive in a public place (e.g. a theatre or a meeting) 0 0 0   As a passenger in a car for an hour without a

## 2024-12-10 NOTE — PATIENT INSTRUCTIONS
[address]  [place] [address]  Tel.  [phone]  Fax. [phone] [address]  [place] [address]  Tel.  [phone]  Fax. [phone] [address] [place] [address]  Tel.  [phone]  Fax. [phone]     PROPER SLEEP HYGIENE    What to avoid  Do not have drinks with caffeine, such as coffee or black tea, for 8 hours before bed.  Do not smoke or use other types of tobacco near bedtime. Nicotine is a stimulant and can keep you awake.  Avoid drinking alcohol late in the evening, because it can cause you to wake in the middle of the night.  Do not eat a big meal close to bedtime. If you are hungry, eat a light snack.  Do not drink a lot of water close to bedtime, because the need to urinate may wake you up during the night.  Do not read or watch TV in bed. Use the bed only for sleeping and sexual activity.  What to try  Go to bed at the same time every night, and wake up at the same time every morning. Do not take naps during the day.  Keep your bedroom quiet, dark, and cool.  Get regular exercise, but not within 3 to 4 hours of your bedtime..  Sleep on a comfortable pillow and mattress.  If watching the clock makes you anxious, turn it facing away from you so you cannot see the time.  If you worry when you lie down, start a worry book. Well before bedtime, write down your worries, and then set the book and your concerns aside.  Try meditation or other relaxation techniques before you go to bed.  If you cannot fall asleep, get up and go to another room until you feel sleepy. Do something relaxing. Repeat your bedtime routine before you go to bed again.  Make your house quiet and calm about an hour before bedtime. Turn down the lights, turn off the TV, log off the computer, and turn down the volume on music. This can help you relax after a busy day.    Drowsy Driving  The [place] National Highway Traffic Safety Administration cites drowsiness as a

## 2025-03-03 DIAGNOSIS — F41.9 ANXIETY: ICD-10-CM

## 2025-03-04 ENCOUNTER — TELEPHONE (OUTPATIENT)
Facility: CLINIC | Age: 70
End: 2025-03-04

## 2025-03-04 RX ORDER — SERTRALINE HYDROCHLORIDE 25 MG/1
25 TABLET, FILM COATED ORAL DAILY
Qty: 90 TABLET | Refills: 3 | Status: SHIPPED | OUTPATIENT
Start: 2025-03-04

## 2025-03-04 NOTE — TELEPHONE ENCOUNTER
Pt scheduled for Prolia injection on 03/11/2025.  Prolia form has been placed in provider's inbox for signature.

## 2025-03-06 ENCOUNTER — TELEPHONE (OUTPATIENT)
Facility: CLINIC | Age: 70
End: 2025-03-06

## 2025-03-06 NOTE — TELEPHONE ENCOUNTER
Lab orders placed. 03/04/2025.    Please call pt, and ask that female have labs drawn prior to scheduled appt, so results can be discussed at that time.

## 2025-03-10 ENCOUNTER — TELEPHONE (OUTPATIENT)
Age: 70
End: 2025-03-10

## 2025-03-10 NOTE — TELEPHONE ENCOUNTER
PT name and  verified    68 yo last ov/ae 3/4/24, last mammo 3/13/24      PT calling stating that  wants her to have a 3D mammo and she is scheduled for 3/17 for our regular screening mammo.  RN asked why he wanted a 3, as her one last year was negative and fu in one year.  PT states because one of her breast is larger than the other.  RN relayed that  would have to put that order in, if he evaluated her and wants that performed. And then she could call CS and schedule that for downstairs, or see where  prefers her to go.   PT voices understanding and will call back and cancel her mammo here, once she has everything lined up with .

## 2025-03-13 DIAGNOSIS — E53.8 VITAMIN B12 DEFICIENCY: ICD-10-CM

## 2025-03-13 DIAGNOSIS — N83.201 RIGHT OVARIAN CYST: Primary | ICD-10-CM

## 2025-03-13 DIAGNOSIS — I10 ESSENTIAL HYPERTENSION: ICD-10-CM

## 2025-03-13 DIAGNOSIS — K70.0 ALCOHOL INDUCED FATTY LIVER: ICD-10-CM

## 2025-03-13 DIAGNOSIS — D75.89 MACROCYTOSIS WITHOUT ANEMIA: ICD-10-CM

## 2025-03-13 NOTE — TELEPHONE ENCOUNTER
Deepak Subramanian, ROSALINA  You17 minutes ago (9:50 AM)     AC  Unable to reach patient at number on file, left message on patient's personal VM to please complete labs prior to appointment.

## 2025-03-14 LAB
ALBUMIN SERPL-MCNC: 4.2 G/DL (ref 3.5–5)
ALBUMIN/GLOB SERPL: 1.5 (ref 1.1–2.2)
ALP SERPL-CCNC: 72 U/L (ref 45–117)
ALT SERPL-CCNC: 26 U/L (ref 12–78)
ANION GAP SERPL CALC-SCNC: 7 MMOL/L (ref 2–12)
AST SERPL-CCNC: 20 U/L (ref 15–37)
BASOPHILS # BLD: 0.04 K/UL (ref 0–0.1)
BASOPHILS NFR BLD: 0.8 % (ref 0–1)
BILIRUB SERPL-MCNC: 0.7 MG/DL (ref 0.2–1)
BUN SERPL-MCNC: 14 MG/DL (ref 6–20)
BUN/CREAT SERPL: 21 (ref 12–20)
CALCIUM SERPL-MCNC: 9.7 MG/DL (ref 8.5–10.1)
CHLORIDE SERPL-SCNC: 104 MMOL/L (ref 97–108)
CO2 SERPL-SCNC: 28 MMOL/L (ref 21–32)
CREAT SERPL-MCNC: 0.68 MG/DL (ref 0.55–1.02)
DIFFERENTIAL METHOD BLD: NORMAL
EOSINOPHIL # BLD: 0.17 K/UL (ref 0–0.4)
EOSINOPHIL NFR BLD: 3.3 % (ref 0–7)
ERYTHROCYTE [DISTWIDTH] IN BLOOD BY AUTOMATED COUNT: 13.9 % (ref 11.5–14.5)
FOLATE SERPL-MCNC: 40.5 NG/ML (ref 5–21)
GLOBULIN SER CALC-MCNC: 2.8 G/DL (ref 2–4)
GLUCOSE SERPL-MCNC: 129 MG/DL (ref 65–100)
HCT VFR BLD AUTO: 39 % (ref 35–47)
HGB BLD-MCNC: 12.5 G/DL (ref 11.5–16)
IMM GRANULOCYTES # BLD AUTO: 0.01 K/UL (ref 0–0.04)
IMM GRANULOCYTES NFR BLD AUTO: 0.2 % (ref 0–0.5)
LYMPHOCYTES # BLD: 1.29 K/UL (ref 0.8–3.5)
LYMPHOCYTES NFR BLD: 24.9 % (ref 12–49)
MCH RBC QN AUTO: 28.7 PG (ref 26–34)
MCHC RBC AUTO-ENTMCNC: 32.1 G/DL (ref 30–36.5)
MCV RBC AUTO: 89.7 FL (ref 80–99)
MONOCYTES # BLD: 0.43 K/UL (ref 0–1)
MONOCYTES NFR BLD: 8.3 % (ref 5–13)
NEUTS SEG # BLD: 3.24 K/UL (ref 1.8–8)
NEUTS SEG NFR BLD: 62.5 % (ref 32–75)
NRBC # BLD: 0 K/UL (ref 0–0.01)
NRBC BLD-RTO: 0 PER 100 WBC
PLATELET # BLD AUTO: 273 K/UL (ref 150–400)
PMV BLD AUTO: 11.7 FL (ref 8.9–12.9)
POTASSIUM SERPL-SCNC: 4.2 MMOL/L (ref 3.5–5.1)
PROT SERPL-MCNC: 7 G/DL (ref 6.4–8.2)
RBC # BLD AUTO: 4.35 M/UL (ref 3.8–5.2)
SODIUM SERPL-SCNC: 139 MMOL/L (ref 136–145)
VIT B12 SERPL-MCNC: 454 PG/ML (ref 193–986)
WBC # BLD AUTO: 5.2 K/UL (ref 3.6–11)

## 2025-03-17 ENCOUNTER — OFFICE VISIT (OUTPATIENT)
Age: 70
End: 2025-03-17
Payer: MEDICARE

## 2025-03-17 ENCOUNTER — RESULTS FOLLOW-UP (OUTPATIENT)
Age: 70
End: 2025-03-17

## 2025-03-17 VITALS — BODY MASS INDEX: 28.72 KG/M2 | WEIGHT: 157 LBS | SYSTOLIC BLOOD PRESSURE: 126 MMHG | DIASTOLIC BLOOD PRESSURE: 82 MMHG

## 2025-03-17 DIAGNOSIS — R93.89 ABNORMAL GENITOURINARY ULTRASOUND: ICD-10-CM

## 2025-03-17 DIAGNOSIS — Z01.411 ENCOUNTER FOR GYNECOLOGICAL EXAMINATION (GENERAL) (ROUTINE) WITH ABNORMAL FINDINGS: Primary | ICD-10-CM

## 2025-03-17 DIAGNOSIS — R92.8 ABNORMAL MAMMOGRAM OF RIGHT BREAST: Primary | ICD-10-CM

## 2025-03-17 DIAGNOSIS — N83.201 RIGHT OVARIAN CYST: ICD-10-CM

## 2025-03-17 PROCEDURE — 3079F DIAST BP 80-89 MM HG: CPT | Performed by: OBSTETRICS & GYNECOLOGY

## 2025-03-17 PROCEDURE — 1160F RVW MEDS BY RX/DR IN RCRD: CPT | Performed by: OBSTETRICS & GYNECOLOGY

## 2025-03-17 PROCEDURE — G0101 CA SCREEN;PELVIC/BREAST EXAM: HCPCS | Performed by: OBSTETRICS & GYNECOLOGY

## 2025-03-17 PROCEDURE — 1036F TOBACCO NON-USER: CPT | Performed by: OBSTETRICS & GYNECOLOGY

## 2025-03-17 PROCEDURE — G8417 CALC BMI ABV UP PARAM F/U: HCPCS | Performed by: OBSTETRICS & GYNECOLOGY

## 2025-03-17 PROCEDURE — 3074F SYST BP LT 130 MM HG: CPT | Performed by: OBSTETRICS & GYNECOLOGY

## 2025-03-17 PROCEDURE — 99212 OFFICE O/P EST SF 10 MIN: CPT | Performed by: OBSTETRICS & GYNECOLOGY

## 2025-03-17 PROCEDURE — 1159F MED LIST DOCD IN RCRD: CPT | Performed by: OBSTETRICS & GYNECOLOGY

## 2025-03-17 PROCEDURE — 1090F PRES/ABSN URINE INCON ASSESS: CPT | Performed by: OBSTETRICS & GYNECOLOGY

## 2025-03-17 PROCEDURE — 3017F COLORECTAL CA SCREEN DOC REV: CPT | Performed by: OBSTETRICS & GYNECOLOGY

## 2025-03-17 PROCEDURE — G8427 DOCREV CUR MEDS BY ELIG CLIN: HCPCS | Performed by: OBSTETRICS & GYNECOLOGY

## 2025-03-17 PROCEDURE — G8399 PT W/DXA RESULTS DOCUMENT: HCPCS | Performed by: OBSTETRICS & GYNECOLOGY
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
## 2025-03-17 NOTE — RESULT ENCOUNTER NOTE
Abnormal, needs follow up within two weeks.  Order: 3-D DIAGNOSTIC mammogram for rt side or requested imaging per radiology.  TICKLE until fu completed and in chart.   Notify patient if not done by radiology, MC not read or not MC active.   Update PMH/HM: include: ABNORMAL, date of imaging (mm/dd/yy)  If not bilateral: record side of imaging

## 2025-03-18 ENCOUNTER — OFFICE VISIT (OUTPATIENT)
Facility: CLINIC | Age: 70
End: 2025-03-18
Payer: MEDICARE

## 2025-03-18 VITALS
TEMPERATURE: 96.9 F | OXYGEN SATURATION: 97 % | SYSTOLIC BLOOD PRESSURE: 132 MMHG | DIASTOLIC BLOOD PRESSURE: 87 MMHG | BODY MASS INDEX: 25.83 KG/M2 | WEIGHT: 155 LBS | HEART RATE: 66 BPM | HEIGHT: 65 IN | RESPIRATION RATE: 16 BRPM

## 2025-03-18 DIAGNOSIS — F41.9 ANXIETY: ICD-10-CM

## 2025-03-18 DIAGNOSIS — R79.89 LOW T4: ICD-10-CM

## 2025-03-18 DIAGNOSIS — F33.1 MODERATE EPISODE OF RECURRENT MAJOR DEPRESSIVE DISORDER (HCC): ICD-10-CM

## 2025-03-18 DIAGNOSIS — I10 ESSENTIAL HYPERTENSION: ICD-10-CM

## 2025-03-18 DIAGNOSIS — F10.90 ALCOHOL USE DISORDER: ICD-10-CM

## 2025-03-18 DIAGNOSIS — E53.8 VITAMIN B12 DEFICIENCY: ICD-10-CM

## 2025-03-18 DIAGNOSIS — I25.119 ATHEROSCLEROSIS OF NATIVE CORONARY ARTERY OF NATIVE HEART WITH ANGINA PECTORIS: ICD-10-CM

## 2025-03-18 DIAGNOSIS — Z00.00 MEDICARE ANNUAL WELLNESS VISIT, SUBSEQUENT: Primary | ICD-10-CM

## 2025-03-18 DIAGNOSIS — Z01.818 PREOP EXAMINATION: ICD-10-CM

## 2025-03-18 DIAGNOSIS — E78.00 HYPERCHOLESTEROLEMIA: ICD-10-CM

## 2025-03-18 DIAGNOSIS — K70.0 ALCOHOL INDUCED FATTY LIVER: ICD-10-CM

## 2025-03-18 DIAGNOSIS — R22.31 NODULE OF FINGER OF RIGHT HAND: ICD-10-CM

## 2025-03-18 DIAGNOSIS — E55.9 VITAMIN D DEFICIENCY: ICD-10-CM

## 2025-03-18 PROBLEM — I20.9 ANGINA PECTORIS, UNSPECIFIED: Status: RESOLVED | Noted: 2023-09-29 | Resolved: 2025-03-18

## 2025-03-18 PROCEDURE — 99214 OFFICE O/P EST MOD 30 MIN: CPT | Performed by: FAMILY MEDICINE

## 2025-03-18 PROCEDURE — 3075F SYST BP GE 130 - 139MM HG: CPT | Performed by: FAMILY MEDICINE

## 2025-03-18 PROCEDURE — 1090F PRES/ABSN URINE INCON ASSESS: CPT | Performed by: FAMILY MEDICINE

## 2025-03-18 PROCEDURE — 3079F DIAST BP 80-89 MM HG: CPT | Performed by: FAMILY MEDICINE

## 2025-03-18 PROCEDURE — 1036F TOBACCO NON-USER: CPT | Performed by: FAMILY MEDICINE

## 2025-03-18 PROCEDURE — 3017F COLORECTAL CA SCREEN DOC REV: CPT | Performed by: FAMILY MEDICINE

## 2025-03-18 PROCEDURE — G8399 PT W/DXA RESULTS DOCUMENT: HCPCS | Performed by: FAMILY MEDICINE

## 2025-03-18 PROCEDURE — G0439 PPPS, SUBSEQ VISIT: HCPCS | Performed by: FAMILY MEDICINE

## 2025-03-18 PROCEDURE — G8417 CALC BMI ABV UP PARAM F/U: HCPCS | Performed by: FAMILY MEDICINE

## 2025-03-18 PROCEDURE — 1123F ACP DISCUSS/DSCN MKR DOCD: CPT | Performed by: FAMILY MEDICINE

## 2025-03-18 PROCEDURE — 1159F MED LIST DOCD IN RCRD: CPT | Performed by: FAMILY MEDICINE

## 2025-03-18 PROCEDURE — G8427 DOCREV CUR MEDS BY ELIG CLIN: HCPCS | Performed by: FAMILY MEDICINE

## 2025-03-18 PROCEDURE — 1126F AMNT PAIN NOTED NONE PRSNT: CPT | Performed by: FAMILY MEDICINE

## 2025-03-18 RX ORDER — GABAPENTIN 300 MG/1
300 CAPSULE ORAL 3 TIMES DAILY
COMMUNITY
Start: 2024-10-22 | End: 2025-03-18

## 2025-03-18 RX ORDER — NALTREXONE HYDROCHLORIDE 50 MG/1
50 TABLET, FILM COATED ORAL DAILY
COMMUNITY
Start: 2025-03-05 | End: 2025-04-04

## 2025-03-18 SDOH — ECONOMIC STABILITY: FOOD INSECURITY: WITHIN THE PAST 12 MONTHS, THE FOOD YOU BOUGHT JUST DIDN'T LAST AND YOU DIDN'T HAVE MONEY TO GET MORE.: NEVER TRUE

## 2025-03-18 SDOH — ECONOMIC STABILITY: FOOD INSECURITY: WITHIN THE PAST 12 MONTHS, YOU WORRIED THAT YOUR FOOD WOULD RUN OUT BEFORE YOU GOT MONEY TO BUY MORE.: NEVER TRUE

## 2025-03-18 ASSESSMENT — PATIENT HEALTH QUESTIONNAIRE - PHQ9
3. TROUBLE FALLING OR STAYING ASLEEP: NOT AT ALL
5. POOR APPETITE OR OVEREATING: NOT AT ALL
SUM OF ALL RESPONSES TO PHQ QUESTIONS 1-9: 0
9. THOUGHTS THAT YOU WOULD BE BETTER OFF DEAD, OR OF HURTING YOURSELF: NOT AT ALL
SUM OF ALL RESPONSES TO PHQ QUESTIONS 1-9: 0
6. FEELING BAD ABOUT YOURSELF - OR THAT YOU ARE A FAILURE OR HAVE LET YOURSELF OR YOUR FAMILY DOWN: NOT AT ALL
2. FEELING DOWN, DEPRESSED OR HOPELESS: NOT AT ALL
10. IF YOU CHECKED OFF ANY PROBLEMS, HOW DIFFICULT HAVE THESE PROBLEMS MADE IT FOR YOU TO DO YOUR WORK, TAKE CARE OF THINGS AT HOME, OR GET ALONG WITH OTHER PEOPLE: NOT DIFFICULT AT ALL
1. LITTLE INTEREST OR PLEASURE IN DOING THINGS: NOT AT ALL
4. FEELING TIRED OR HAVING LITTLE ENERGY: NOT AT ALL
SUM OF ALL RESPONSES TO PHQ QUESTIONS 1-9: 0
7. TROUBLE CONCENTRATING ON THINGS, SUCH AS READING THE NEWSPAPER OR WATCHING TELEVISION: NOT AT ALL
8. MOVING OR SPEAKING SO SLOWLY THAT OTHER PEOPLE COULD HAVE NOTICED. OR THE OPPOSITE, BEING SO FIGETY OR RESTLESS THAT YOU HAVE BEEN MOVING AROUND A LOT MORE THAN USUAL: NOT AT ALL
SUM OF ALL RESPONSES TO PHQ QUESTIONS 1-9: 0

## 2025-03-18 ASSESSMENT — LIFESTYLE VARIABLES
HOW OFTEN DO YOU HAVE A DRINK CONTAINING ALCOHOL: NEVER
HOW MANY STANDARD DRINKS CONTAINING ALCOHOL DO YOU HAVE ON A TYPICAL DAY: PATIENT DOES NOT DRINK

## 2025-03-18 NOTE — PROGRESS NOTES
Chief Complaint   Patient presents with    Medicare AWV     AWV.   Is having eye surgery in April and has a preop form she would like to have completed.  [email or web address] is ok besides her finger tiop in both hands feel like they are a sleep all the time. Also happens when she sleeps with her arm above her ead.     Discuss Labs     Had labs drawn and would like to discuss       
Medicare Annual Wellness Visit    Kalina Shah is here for Medicare AWV (AWV.   Is having eye surgery in April and has a preop form she would like to have completed.  [employer] is ok besides her finger tiop in both hands feel like they are a sleep all the time. Also happens when she sleeps with her arm above her ead. ) and Discuss Labs (Had labs drawn and would like to discuss)    Assessment & Plan  1. Medicare annual wellness visit:   - Continue current medication regimen and maintain sobriety.  - Persist with weight loss efforts and perform neck muscle strengthening exercises.    2. Carpal tunnel syndrome: Severe.  - Use wrist splints, apply heat, perform wrist stretching exercises, and apply Voltaren gel.  - Consider cortisone injections if symptoms persist.    3. Trigger finger: Intermittent.  - Keep fingers straight for 1-2 weeks.  - Consider cortisone injections if symptoms persist.    4. Right hand nodule:   - Order ultrasound to confirm lipoma.    5. Sleep apnea:   - Continue CPAP and work on weight loss.  - Perform neck muscle strengthening exercises.    6. Mental health management:   - Continue sertraline 25 mg daily, BuSpar 10 mg twice a day, naltrexone 50 mg daily, and gabapentin 300 mg three times a day.  - Maintain sobriety.    7. Macular hole:   - Complete preoperative form.    Follow-up  - Scheduled for eye surgery in April 2025 for a macular hole in the left eye.    Medicare annual wellness visit, subsequent  Moderate episode of recurrent major depressive disorder (HCC)  Anxiety  Alcohol use disorder  Nodule of finger of right hand  -     US HEAD NECK SOFT TISSUE; Future    Results  - Red cells normalized  - Liver enzymes normalized       No follow-ups on file.     Subjective     History of Present Illness  The patient is a 69-year-old female presenting for a Medicare annual wellness visit and laboratory follow-up.    Increased Energy Levels  - Reports increased energy levels attributed to 
40mg daily for ADHD related anxiety? (managed by Misa Tolentino), propranolol ER 60mg daily (drops in blood pressure)    Therapy: yes, enjoying  4. Alcohol use disorder  Overview:  Background: root cause of several of her recurrent issues.  Medication: gabapentin 300mg TID, naltrexone 50mg daily    Specialist: [facility]    Sober: since October 2024.  5. Nodule of finger of right hand  -     US HEAD NECK SOFT TISSUE; Future  6. Atherosclerosis of native coronary artery of native heart with angina pectoris  Overview:  Medication: Aspirin 81mg daily, Lipitor 40mg daily          Pertinent FHx includes:   Family History   Problem Relation Age of Onset    Elevated Lipids Mother     Osteoporosis Mother     Elevated Lipids Father     Hypertension Father     Prostate Cancer Father     Elevated Lipids Sister     High Blood Pressure Paternal Grandmother     Cancer Paternal Uncle         brain       Pertinent SocHx includes:   Social History     Tobacco Use   Smoking Status Former    Types: Cigarettes   Smokeless Tobacco Never   Tobacco Comments    Quit smoking cigarettes 3/2023     Social History     Substance and Sexual Activity   Alcohol Use Yes    Comment: /2-3 glass of wine per week per pt on 3/27/2024   QUIT drinking in October 2024.    Cardiac Pre-Operative Evaluation  Emergency: No  ACS: No  RCRI Criteria:   Last Creat:   Lab Results   Component Value Date    CREATININE 0.68 03/13/2025    CREATININE 0.61 11/07/2024    CREATININE 0.47 (L) 09/11/2024      CHF: no   Insulin Dependent DM: no No results found for: \"LABA1C\"   Suprainguinal vascular surgery, intrathoracic surgery, intraabdominal surgery?: no   Hx of Stroke or TIA: no   Ischemic Heart Dz: no, but does have CAD on aspirin and statin.    Review of Systems    OBJECTIVE  /87   Pulse 66   Temp 96.9 °F (36.1 °C)   Resp 16   Ht 1.651 m (5' 5\")   Wt 70.3 kg (155 lb)   SpO2 97%   BMI 25.79 kg/m²     Constitutional: Well-appearing, no acute

## 2025-03-18 NOTE — PATIENT INSTRUCTIONS
having a problem from this test. If dilating drops are used for a vision test, they may make the eyes sting and cause a medicine taste in the mouth.  Follow-up care is a key part of your treatment and safety. Be sure to make and go to all appointments, and call your doctor if you are having problems. It's also a good idea to know your test results and keep a list of the medicines you take.  Where can you learn more?  Go to https://www."[employer].net/patientEd and enter G551 to learn more about \"Learning About Vision Tests.\"  Current as of: July 31, 2024  Content Version: 14.4  © 8972-0779 [employer].   Care instructions adapted under license by [employer]. If you have questions about a medical condition or this instruction, always ask your healthcare professional. [employer] disclaims any warranty or liability for your use of this information.         Learning About Activities of Daily Living  What are activities of daily living?     Activities of daily living (ADLs) are the basic self-care tasks you do every day. These include eating, bathing, dressing, and moving around.  As you age, and if you have health problems, you may find that it's harder to do some of these tasks. If so, your doctor can suggest ideas that may help.  To measure what kind of help you may need, your doctor will ask how well you are able to do ADLs. Let your doctor know if there are any tasks that you are having trouble doing. This is an important first step to getting help. And when you have the help you need, you can stay as independent as possible.  How will a doctor assess your ADLs?  Asking about ADLs is part of a routine health checkup your doctor will likely do as you age. Your health check might be done in a doctor's office, in your home, or at a hospital. The goal is to find out if you are having any problems that could make it hard to care for yourself or that make it unsafe for you to be on your own.  To

## 2025-03-19 DIAGNOSIS — I10 ESSENTIAL HYPERTENSION: Primary | ICD-10-CM

## 2025-03-19 DIAGNOSIS — Z01.818 PRE-OP EXAM: ICD-10-CM

## 2025-03-20 ENCOUNTER — HOSPITAL ENCOUNTER (OUTPATIENT)
Facility: HOSPITAL | Age: 70
Discharge: HOME | End: 2025-03-23
Attending: FAMILY MEDICINE

## 2025-03-20 DIAGNOSIS — I10 ESSENTIAL HYPERTENSION: ICD-10-CM

## 2025-03-20 DIAGNOSIS — Z01.818 PRE-OP EXAM: ICD-10-CM

## 2025-03-21 ENCOUNTER — RESULTS FOLLOW-UP (OUTPATIENT)
Facility: HOSPITAL | Age: 70
End: 2025-03-21

## 2025-03-21 LAB
EKG ATRIAL RATE: 66 BPM
EKG DIAGNOSIS: NORMAL
EKG P AXIS: 59 DEGREES
EKG P-R INTERVAL: 164 MS
EKG Q-T INTERVAL: 396 MS
EKG QRS DURATION: 82 MS
EKG QTC CALCULATION (BAZETT): 415 MS
EKG R AXIS: -61 DEGREES
EKG T AXIS: 2 DEGREES
EKG VENTRICULAR RATE: 66 BPM

## 2025-03-21 NOTE — TELEPHONE ENCOUNTER
----- Message from Dr. Rizwana Machado MD sent at 3/17/2025  6:37 PM EDT -----  Abnormal, needs follow up within two weeks.  Order: 3-D DIAGNOSTIC mammogram for rt side or requested imaging per radiology.  TICKLE until fu completed and in chart.   Notify patient if not done by radiology, MC not read or not MC active.   Update PMH/HM: include: ABNORMAL, date of imaging (mm/dd/yy)  If not bilateral: record side of imaging

## 2025-03-28 ENCOUNTER — HOSPITAL ENCOUNTER (OUTPATIENT)
Facility: HOSPITAL | Age: 70
Discharge: HOME | End: 2025-03-31
Attending: FAMILY MEDICINE
Payer: MEDICARE

## 2025-03-28 DIAGNOSIS — R22.31 NODULE OF FINGER OF RIGHT HAND: ICD-10-CM

## 2025-03-28 PROCEDURE — 76536 US EXAM OF HEAD AND NECK: CPT

## 2025-03-28 PROCEDURE — 76882 US LMTD JT/FCL EVL NVASC XTR: CPT

## 2025-04-01 ENCOUNTER — RESULTS FOLLOW-UP (OUTPATIENT)
Facility: CLINIC | Age: 70
End: 2025-04-01

## 2025-04-01 ENCOUNTER — HOSPITAL ENCOUNTER (OUTPATIENT)
Facility: HOSPITAL | Age: 70
Discharge: HOME | End: 2025-04-04
Attending: INTERNAL MEDICINE
Payer: MEDICARE

## 2025-04-01 DIAGNOSIS — E04.1 THYROID CYST: ICD-10-CM

## 2025-04-01 DIAGNOSIS — L98.9 HAND LESION: Primary | ICD-10-CM

## 2025-04-01 PROCEDURE — 76536 US EXAM OF HEAD AND NECK: CPT

## 2025-04-02 ENCOUNTER — TELEPHONE (OUTPATIENT)
Age: 70
End: 2025-04-02

## 2025-04-02 ENCOUNTER — RESULTS FOLLOW-UP (OUTPATIENT)
Age: 70
End: 2025-04-02

## 2025-04-02 NOTE — TELEPHONE ENCOUNTER
Spoke with pt to get details for eye surgery-forwarded Cardiac clearance request to Dr. JOSUE German          ----- Message from Dr. Barry German MD sent at 4/2/2025  3:22 PM EDT -----  She does not need to be seen for clearance    Kaila, please get the details and set up a note. thanks  ----- Message -----  From: Marzena Crawford  Sent: 4/2/2025  10:20 AM EDT  To: MD Pollo Diop III--    This is Nahid's patient and she is having eye surgery on 4-9-2025, can you see her on Monday 4-7-2025 for clearance ?    Please advise.    Thanks

## 2025-04-02 NOTE — TELEPHONE ENCOUNTER
Patient having eye surgery on 4-9-2025 , last seen 4-5-2024 .  Need clearance , advised patient to have office give us a call .  Nahid out of office until 4-8-2025.    Thanks

## 2025-04-02 NOTE — TELEPHONE ENCOUNTER
Cardiac Clearance     Dr. Rodriguez Rose    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

## 2025-04-02 NOTE — TELEPHONE ENCOUNTER
Ok from cardiac standpoint without special precautions or further cardiac testing. Hold aspirin for 7 days if necessary, and resume post op as soon as bleeding risk is acceptable

## 2025-04-03 NOTE — TELEPHONE ENCOUNTER
Faxed cardiac clearance to [facility] with faxed confirmation of receipt

## 2025-04-21 NOTE — PROGRESS NOTES
Tobacco Use    Smoking status: Former     Types: Cigarettes    Smokeless tobacco: Never    Tobacco comments:     Quit smoking cigarettes 3/2023   Vaping Use    Vaping status: Never Used   Substance and Sexual Activity    Alcohol use: Not Currently     Comment: /2-3 glass of wine per week per pt on 3/27/2024    Drug use: Never    Sexual activity: Not Currently     Birth control/protection: Abstinence, Post-menopausal     Social Drivers of Health     Financial Resource Strain: Low Risk  (11/12/2024)    Overall Financial Resource Strain (CARDIA)     Difficulty of Paying Living Expenses: Not hard at all   Food Insecurity: No Food Insecurity (3/18/2025)    Hunger Vital Sign     Worried About Running Out of Food in the Last Year: Never true     Ran Out of Food in the Last Year: Never true   Transportation Needs: No Transportation Needs (3/18/2025)    PRAPARE - Transportation     Lack of Transportation (Medical): No     Lack of Transportation (Non-Medical): No   Physical Activity: Sufficiently Active (3/18/2025)    Exercise Vital Sign     Days of Exercise per Week: 4 days     Minutes of Exercise per Session: 60 min   Housing Stability: Low Risk  (3/18/2025)    Housing Stability Vital Sign     Unable to Pay for Housing in the Last Year: No     Number of Times Moved in the Last Year: 0     Homeless in the Last Year: No       Meds:  Current Outpatient Medications on File Prior to Visit   Medication Sig Dispense Refill    naltrexone (DEPADE) 50 MG tablet Take 1 tablet by mouth daily      sertraline (ZOLOFT) 25 MG tablet Take 1 tablet by mouth daily 90 tablet 3    busPIRone (BUSPAR) 10 MG tablet TAKE 1 TABLET BY MOUTH TWICE DAILY 180 tablet 3    aspirin 81 MG EC tablet Take 1 tablet by mouth daily 90 tablet 1    atorvastatin (LIPITOR) 40 MG tablet Take 1 tablet by mouth nightly 90 tablet 1    Magnesium 400 MG CAPS Take 400 mg by mouth every evening Magnesium glycinate      gabapentin (NEURONTIN) 300 MG capsule Take 1

## 2025-04-22 ENCOUNTER — HOSPITAL ENCOUNTER (OUTPATIENT)
Facility: HOSPITAL | Age: 70
Discharge: HOME | End: 2025-04-25
Payer: MEDICARE

## 2025-04-22 ENCOUNTER — TRANSCRIBE ORDERS (OUTPATIENT)
Facility: HOSPITAL | Age: 70
End: 2025-04-22

## 2025-04-22 ENCOUNTER — OFFICE VISIT (OUTPATIENT)
Age: 70
End: 2025-04-22
Payer: MEDICARE

## 2025-04-22 VITALS — WEIGHT: 155 LBS | HEIGHT: 64 IN | BODY MASS INDEX: 26.46 KG/M2

## 2025-04-22 VITALS
OXYGEN SATURATION: 95 % | DIASTOLIC BLOOD PRESSURE: 80 MMHG | TEMPERATURE: 95.3 F | HEART RATE: 61 BPM | SYSTOLIC BLOOD PRESSURE: 130 MMHG | BODY MASS INDEX: 26.61 KG/M2 | HEIGHT: 64 IN

## 2025-04-22 DIAGNOSIS — R92.8 ABNORMAL MAMMOGRAM OF RIGHT BREAST: ICD-10-CM

## 2025-04-22 DIAGNOSIS — R90.89 ABNORMAL BRAIN MRI: ICD-10-CM

## 2025-04-22 DIAGNOSIS — G89.29 CHRONIC LOW BACK PAIN, UNSPECIFIED BACK PAIN LATERALITY, UNSPECIFIED WHETHER SCIATICA PRESENT: ICD-10-CM

## 2025-04-22 DIAGNOSIS — G31.84 MILD COGNITIVE IMPAIRMENT: ICD-10-CM

## 2025-04-22 DIAGNOSIS — Z12.31 VISIT FOR SCREENING MAMMOGRAM: Primary | ICD-10-CM

## 2025-04-22 DIAGNOSIS — F41.9 SEVERE ANXIETY: ICD-10-CM

## 2025-04-22 DIAGNOSIS — R20.2 PARESTHESIA: Primary | ICD-10-CM

## 2025-04-22 DIAGNOSIS — M54.50 CHRONIC LOW BACK PAIN, UNSPECIFIED BACK PAIN LATERALITY, UNSPECIFIED WHETHER SCIATICA PRESENT: ICD-10-CM

## 2025-04-22 PROCEDURE — G8399 PT W/DXA RESULTS DOCUMENT: HCPCS | Performed by: PSYCHIATRY & NEUROLOGY

## 2025-04-22 PROCEDURE — 1090F PRES/ABSN URINE INCON ASSESS: CPT | Performed by: PSYCHIATRY & NEUROLOGY

## 2025-04-22 PROCEDURE — 1123F ACP DISCUSS/DSCN MKR DOCD: CPT | Performed by: PSYCHIATRY & NEUROLOGY

## 2025-04-22 PROCEDURE — G0279 TOMOSYNTHESIS, MAMMO: HCPCS

## 2025-04-22 PROCEDURE — 3079F DIAST BP 80-89 MM HG: CPT | Performed by: PSYCHIATRY & NEUROLOGY

## 2025-04-22 PROCEDURE — G8417 CALC BMI ABV UP PARAM F/U: HCPCS | Performed by: PSYCHIATRY & NEUROLOGY

## 2025-04-22 PROCEDURE — 1036F TOBACCO NON-USER: CPT | Performed by: PSYCHIATRY & NEUROLOGY

## 2025-04-22 PROCEDURE — 3075F SYST BP GE 130 - 139MM HG: CPT | Performed by: PSYCHIATRY & NEUROLOGY

## 2025-04-22 PROCEDURE — G8428 CUR MEDS NOT DOCUMENT: HCPCS | Performed by: PSYCHIATRY & NEUROLOGY

## 2025-04-22 PROCEDURE — 99214 OFFICE O/P EST MOD 30 MIN: CPT | Performed by: PSYCHIATRY & NEUROLOGY

## 2025-04-22 PROCEDURE — 3017F COLORECTAL CA SCREEN DOC REV: CPT | Performed by: PSYCHIATRY & NEUROLOGY

## 2025-04-22 RX ORDER — NALTREXONE HYDROCHLORIDE 50 MG/1
50 TABLET, FILM COATED ORAL DAILY
COMMUNITY

## 2025-04-23 ENCOUNTER — OFFICE VISIT (OUTPATIENT)
Age: 70
End: 2025-04-23
Payer: MEDICARE

## 2025-04-23 VITALS
BODY MASS INDEX: 25.64 KG/M2 | WEIGHT: 150.2 LBS | TEMPERATURE: 97.8 F | OXYGEN SATURATION: 95 % | DIASTOLIC BLOOD PRESSURE: 71 MMHG | HEART RATE: 64 BPM | HEIGHT: 64 IN | SYSTOLIC BLOOD PRESSURE: 112 MMHG

## 2025-04-23 DIAGNOSIS — R22.1 NECK FULLNESS: ICD-10-CM

## 2025-04-23 DIAGNOSIS — E04.1 THYROID CYST: ICD-10-CM

## 2025-04-23 DIAGNOSIS — E03.9 ACQUIRED HYPOTHYROIDISM: Primary | ICD-10-CM

## 2025-04-23 PROCEDURE — 1123F ACP DISCUSS/DSCN MKR DOCD: CPT | Performed by: INTERNAL MEDICINE

## 2025-04-23 PROCEDURE — 3017F COLORECTAL CA SCREEN DOC REV: CPT | Performed by: INTERNAL MEDICINE

## 2025-04-23 PROCEDURE — 1090F PRES/ABSN URINE INCON ASSESS: CPT | Performed by: INTERNAL MEDICINE

## 2025-04-23 PROCEDURE — G8428 CUR MEDS NOT DOCUMENT: HCPCS | Performed by: INTERNAL MEDICINE

## 2025-04-23 PROCEDURE — 1126F AMNT PAIN NOTED NONE PRSNT: CPT | Performed by: INTERNAL MEDICINE

## 2025-04-23 PROCEDURE — 3074F SYST BP LT 130 MM HG: CPT | Performed by: INTERNAL MEDICINE

## 2025-04-23 PROCEDURE — 99214 OFFICE O/P EST MOD 30 MIN: CPT | Performed by: INTERNAL MEDICINE

## 2025-04-23 PROCEDURE — 3078F DIAST BP <80 MM HG: CPT | Performed by: INTERNAL MEDICINE

## 2025-04-23 PROCEDURE — G8417 CALC BMI ABV UP PARAM F/U: HCPCS | Performed by: INTERNAL MEDICINE

## 2025-04-23 PROCEDURE — G8399 PT W/DXA RESULTS DOCUMENT: HCPCS | Performed by: INTERNAL MEDICINE

## 2025-04-23 PROCEDURE — 1036F TOBACCO NON-USER: CPT | Performed by: INTERNAL MEDICINE
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
## 2025-04-24 LAB
T4 FREE SERPL-MCNC: 0.9 NG/DL (ref 0.8–1.5)
TSH SERPL DL<=0.05 MIU/L-ACNC: 1.41 UIU/ML (ref 0.36–3.74)

## 2025-04-24 ASSESSMENT — VISUAL ACUITY: OU: 1
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
## 2025-04-25 ENCOUNTER — OFFICE VISIT (OUTPATIENT)
Age: 70
End: 2025-04-25
Payer: MEDICARE

## 2025-04-25 ENCOUNTER — RESULTS FOLLOW-UP (OUTPATIENT)
Age: 70
End: 2025-04-25

## 2025-04-25 ENCOUNTER — PROCEDURE VISIT (OUTPATIENT)
Age: 70
End: 2025-04-25
Payer: MEDICARE

## 2025-04-25 VITALS
DIASTOLIC BLOOD PRESSURE: 82 MMHG | BODY MASS INDEX: 25.27 KG/M2 | HEART RATE: 61 BPM | HEIGHT: 64 IN | SYSTOLIC BLOOD PRESSURE: 122 MMHG | OXYGEN SATURATION: 97 % | WEIGHT: 148 LBS

## 2025-04-25 DIAGNOSIS — R20.2 PARESTHESIA: Primary | ICD-10-CM

## 2025-04-25 DIAGNOSIS — Q24.5 CORONARY-MYOCARDIAL BRIDGE: Primary | ICD-10-CM

## 2025-04-25 DIAGNOSIS — G56.03 BILATERAL CARPAL TUNNEL SYNDROME: ICD-10-CM

## 2025-04-25 DIAGNOSIS — I10 PRIMARY HYPERTENSION: ICD-10-CM

## 2025-04-25 DIAGNOSIS — E78.00 HYPERCHOLESTEROLEMIA: ICD-10-CM

## 2025-04-25 DIAGNOSIS — E03.9 ACQUIRED HYPOTHYROIDISM: Primary | ICD-10-CM

## 2025-04-25 DIAGNOSIS — R55 EPISODE OF LOSS OF CONSCIOUSNESS: ICD-10-CM

## 2025-04-25 DIAGNOSIS — F10.90 ALCOHOL USE DISORDER: ICD-10-CM

## 2025-04-25 PROCEDURE — 3079F DIAST BP 80-89 MM HG: CPT | Performed by: NURSE PRACTITIONER

## 2025-04-25 PROCEDURE — 95886 MUSC TEST DONE W/N TEST COMP: CPT | Performed by: PSYCHIATRY & NEUROLOGY

## 2025-04-25 PROCEDURE — 95911 NRV CNDJ TEST 9-10 STUDIES: CPT | Performed by: PSYCHIATRY & NEUROLOGY

## 2025-04-25 PROCEDURE — 1159F MED LIST DOCD IN RCRD: CPT | Performed by: NURSE PRACTITIONER

## 2025-04-25 PROCEDURE — 1090F PRES/ABSN URINE INCON ASSESS: CPT | Performed by: NURSE PRACTITIONER

## 2025-04-25 PROCEDURE — 1123F ACP DISCUSS/DSCN MKR DOCD: CPT | Performed by: NURSE PRACTITIONER

## 2025-04-25 PROCEDURE — G8399 PT W/DXA RESULTS DOCUMENT: HCPCS | Performed by: NURSE PRACTITIONER

## 2025-04-25 PROCEDURE — 99214 OFFICE O/P EST MOD 30 MIN: CPT | Performed by: NURSE PRACTITIONER

## 2025-04-25 PROCEDURE — 3074F SYST BP LT 130 MM HG: CPT | Performed by: NURSE PRACTITIONER

## 2025-04-25 PROCEDURE — G8417 CALC BMI ABV UP PARAM F/U: HCPCS | Performed by: NURSE PRACTITIONER

## 2025-04-25 PROCEDURE — 99213 OFFICE O/P EST LOW 20 MIN: CPT | Performed by: PSYCHIATRY & NEUROLOGY

## 2025-04-25 PROCEDURE — 1036F TOBACCO NON-USER: CPT | Performed by: NURSE PRACTITIONER

## 2025-04-25 PROCEDURE — G8427 DOCREV CUR MEDS BY ELIG CLIN: HCPCS | Performed by: NURSE PRACTITIONER

## 2025-04-25 PROCEDURE — 1126F AMNT PAIN NOTED NONE PRSNT: CPT | Performed by: NURSE PRACTITIONER

## 2025-04-25 PROCEDURE — 3017F COLORECTAL CA SCREEN DOC REV: CPT | Performed by: NURSE PRACTITIONER

## 2025-04-25 ASSESSMENT — PATIENT HEALTH QUESTIONNAIRE - PHQ9
SUM OF ALL RESPONSES TO PHQ QUESTIONS 1-9: 0
SUM OF ALL RESPONSES TO PHQ QUESTIONS 1-9: 0
1. LITTLE INTEREST OR PLEASURE IN DOING THINGS: NOT AT ALL
SUM OF ALL RESPONSES TO PHQ QUESTIONS 1-9: 0
SUM OF ALL RESPONSES TO PHQ QUESTIONS 1-9: 0
2. FEELING DOWN, DEPRESSED OR HOPELESS: NOT AT ALL

## 2025-04-25 NOTE — PROGRESS NOTES
EMG/NCS done. See Procedure Note for results.    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  Advise to wear bilateral wrist brace in the meantime.    Follow up in 1 yr          Ameya Bynum MD    
  Wrist    3.0 <4.0 38.9 >9 Wrist 5th Digit 14.0   Right Ulnar Anti Sensory (5th Digit)  31.4 °C   Wrist    2.7 <4.0 47.1 >9 Wrist 5th Digit 14.0     Motor Summary Table     Stim Site NR Onset (ms) Norm Onset (ms) O-P Amp (mV) Norm O-P Amp Amp (Prev) (%) Site1 Site2 Dist (cm) Alec (m/s) Norm Alec (m/s)   Left Median Motor (Abd Poll Brev)  30.5 °C   Wrist    5.0 <4.5 8.0 >4.1 100.0 Wrist Abd Poll Brev 8.0     Elbow    8.9  7.2  90.0 Elbow Wrist 18.0 46 >49   Right Median Motor (Abd Poll Brev)  31.3 °C   Wrist    4.8 <4.5 4.8 >4.1 100.0 Wrist Abd Poll Brev 8.0     Elbow    8.0  5.5  114.6 Elbow Wrist 18.0 56 >49   Left Ulnar Motor (Abd Dig Minimi)  24.5 °C   Wrist    2.7 <3.1 8.1 >7.0 100.0 Wrist Abd Dig Minimi 8.0     B Elbow    6.2  7.9  97.5 B Elbow Wrist 18.0 51 >50   A Elbow    7.7  7.6  96.2 A Elbow B Elbow 10.0 67 >50   Right Ulnar Motor (Abd Dig Minimi)  31.5 °C   Wrist    2.8 <3.1 7.4 >7.0 100.0 Wrist Abd Dig Minimi 8.0     B Elbow    6.2  7.1  95.9 B Elbow Wrist 18.0 53 >50   A Elbow    8.0  6.9  97.2 A Elbow B Elbow 10.0 56 >50     F Wave Studies     NR F-Lat (ms) Lat Norm (ms) L-R F-Lat (ms) L-R Lat Norm   Left Ulnar (Mrkrs) (Abd Dig Min)  30.8 °C      27.37 <32 0.00 <2.5   Right Ulnar (Mrkrs) (Abd Dig Min)  31.4 °C      27.37 <32 0.00 <2.5     EMG     Side Muscle Nerve Root Ins Act Fibs Psw Recrt Duration Amp Poly Comment   Left 1stDorInt Ulnar C8-T1 Nml Nml Nml Nml Nml Nml Nml    Left ExtIndicis Radial (Post Int) C7-8 Nml Nml Nml Nml Nml Nml Nml    Left Abd Poll Brev Median C8-T1 Nml Nml Nml Reduced Nml Nml Nml    Left Biceps Musculocut C5-6 Nml Nml Nml Nml Nml Nml Nml    Left Triceps Radial C6-7-8 Nml Nml Nml Nml Nml Nml Nml    Left Deltoid Axillary C5-6 Nml Nml Nml Nml Nml Nml Nml    Right 1stDorInt Ulnar C8-T1 Nml Nml Nml Nml Nml Nml Nml    Right ExtIndicis Radial (Post Int) C7-8 Nml Nml Nml Nml Nml Nml Nml    Right Abd Poll Brev Median C8-T1 Nml Nml Nml Reduced Nml Nml Nml    Right Biceps Musculocut

## 2025-04-25 NOTE — RESULT ENCOUNTER NOTE
Subjective:      Patient ID: Zeynep Wang is a 50 y.o. female.    Chief Complaint:  Follow-up      History of Present Illness       With regards to the thyroid nodule:  her PCP noted an enlarged thyroid on routine physical examination in 12/2014. This prompted a thyroid ultrasound on 01/02/2015 which confirmed MNG with a large nodule within the right thyroid lobe measuring 2.3 x 1.8 x 2.0 cm      This prompted FNA which was done on 02/26/2015   SPECIMEN  1) FNA R Thyroid (Clinician)  FINAL PATHOLOGIC DIAGNOSIS  [facility] Thyroid Cytology Category: Benign     Repeat thyroid ultrasound study in 12/2015 noted a 3.0 cm heterogenous nodule occupying the majority of the right lobe of the thyroid      We  repeat FNA which was performed on 01/14/2016 :   SPECIMEN  1) FNA RIGHT Thyroid (Clinician)  FINAL PATHOLOGIC DIAGNOSIS  Right thyroid, fine-needle aspiration:  [facility] Thyroid Cytology Category: BENIGN  Abundant benign follicular epithelial cells, cyst macrophages, scattered lymphocytes, peripheral cells, and colloid consistent with a benign follicular nodule      Repeat thyroid ultrasound in 12/6/18   Impression       Slight enlargement of heterogeneous right thyroid nodule.  This was previously biopsied.  Given increase in size re-biopsy can be considered.     fna 4/26/19  FINAL PATHOLOGIC DIAGNOSIS  [facility] Thyroid Cytology Category: Follicular Lesion of Undetermined Significance (FLUS)     Molecular markers --> 15-20% risk of malignancy     No recent  hoarseness of voice      She denies dysphagia   Denies shortness of breath in the recumbent position      She denies family history of thyroid disease or thyroid cancer   Denies personal history of radiation exposure to her head, face or neck      She denies symptoms of hyper or hypothyroidism at this time       Mother has hypothyroidism       She started otc iodine supplements     With regards to obesity, Body mass index is 30.73  Your thyroid blood test is normal. Continue same dose of levothyroxine. Repeat thyroid levels in 6 months. Ordered. Cecilia Hernandez MD   kg/m².  Denies symptoms of hyperglycemia such as polyuria, polydipsia, nocturia, unexplained weight loss or blurred vision    She has osteopenia on bmd 12/6/18     She does snore  +fatigue - she is aware that she may have florentino and was supposed to have sleep eval     C/o yeast infection       Review of Systems   Constitutional: Negative for unexpected weight change.   Eyes: Negative for visual disturbance.   Respiratory: Negative for shortness of breath.    Cardiovascular: Negative for chest pain.   Gastrointestinal: Negative for abdominal pain.   Musculoskeletal: Negative for arthralgias.   Skin: Negative for wound.   Neurological: Negative for headaches.   Hematological: Does not bruise/bleed easily.   Psychiatric/Behavioral: Negative for sleep disturbance.       Objective:   Physical Exam   Neck:   Thyroid palpable r>l, irregular, non tender, mobile       Cardiovascular: Normal rate.   Pulmonary/Chest: Effort normal.   Abdominal: Soft.   Musculoskeletal: She exhibits no edema.   Lymphadenopathy:     She has no cervical adenopathy.   Vitals reviewed.      Body mass index is 30.73 kg/m².    Lab Review:   Lab Results   Component Value Date    HGBA1C 5.4 12/04/2018     Lab Results   Component Value Date    CHOL 172 12/04/2018    HDL 66 12/04/2018    LDLCALC 94.4 12/04/2018    TRIG 58 12/04/2018    CHOLHDL 38.4 12/04/2018     Lab Results   Component Value Date     12/04/2018    K 3.7 12/04/2018     12/04/2018    CO2 31 (H) 12/04/2018    GLU 89 12/04/2018    BUN 17 12/04/2018    CREATININE 0.8 12/04/2018    CALCIUM 9.3 12/04/2018    PROT 7.2 12/04/2018    ALBUMIN 3.6 12/04/2018    BILITOT 0.7 12/04/2018    ALKPHOS 102 12/04/2018    AST 21 12/04/2018    ALT 26 12/04/2018    ANIONGAP 6 (L) 12/04/2018    ESTGFRAFRICA >60.0 12/04/2018    EGFRNONAA >60.0 12/04/2018    TSH 0.847 12/04/2018       Assessment and Plan     Problem List Items Addressed This Visit        1 - High    Multinodular thyroid - Primary     Current Assessment & Plan     I have reviewed management options including observation  or surgery.  All of the patients questions were answered      I think surgery is reasonable as she has a 20% risk of malignancy  Based on bilateral disease a total thyroidectomy would probably be preferred but ddfer to Dr Adams     Labs today          Relevant Orders    Vitamin D    Comprehensive metabolic panel    TSH    Ambulatory consult to General Surgery       Unprioritized    Yeast infection    Current Assessment & Plan     Diflucan  Local treatment  Call pcp if not better   Check glucose

## 2025-04-30 ENCOUNTER — TELEPHONE (OUTPATIENT)
Age: 70
End: 2025-04-30

## 2025-04-30 NOTE — TELEPHONE ENCOUNTER
Attempted to call. Unsuccessful. Left msg for Kalina Shah to check MyChart or to give us a call back at the office. A callback number was left.

## 2025-05-01 NOTE — TELEPHONE ENCOUNTER
Patient returned nurse call. She will check [employer] message as well. I advised patient of Dr. Hernandez note....    Your thyroid blood test is normal. Continue same dose of levothyroxine. Repeat thyroid levels in 6 months. Ordered. Cecilia Hernandez MD     Patient stated she understood and will recheck her labs in 6 months.

## 2025-05-09 NOTE — H&P
Pharynx: Uvula midline.      Tonsils: No tonsillar exudate or tonsillar abscesses.   Neck:      Thyroid: No thyroid mass.      Trachea: Trachea and phonation normal.   Cardiovascular:      Rate and Rhythm: Normal rate.      Pulses: Normal pulses.      Heart sounds: Normal heart sounds. No murmur heard.  Pulmonary:      Effort: Pulmonary effort is normal. No respiratory distress.      Breath sounds: Normal breath sounds.   Chest:      Chest wall: No tenderness.   Musculoskeletal:      Cervical back: Full passive range of motion without pain.   Skin:     Capillary Refill: Capillary refill takes less than 2 seconds.      Findings: No bruising, erythema, lesion or rash.      Comments: Negative for skin wounds   Neurological:      Mental Status: She is alert and oriented to person, place, and time. Mental status is at baseline.            Assessment    Emergent Procedure? No    Risk Level of Procedure: Low Risk (endoscopy, superficial skin, breast, ambulatory, or cataract, etc.)    ASA Classification: ASA 3 - Patient with moderate systemic disease with multiple functional limitations    Active Cardiac Conditions (including CVA/TIA < 6 months, Decompensated HF and/or severely reduced EF, Newly Diagnosed Arrhythmia, MI < 6 months, Severe Valvular Disease, Severely Uncontrolled HTN, etc.)? No       Revised Cardiac Risk Index Risk factors: None      METs Questionnaire:     METS 1-4    Can you eat, dress, or use the toilet? Yes    Can you do light work around the house, like dusting or washing dishes? Yes    Can you walk a block on level ground at 2-3 mph? Yes      METS 4-10    Can you walk several blocks? Yes     Can you climb 1-2 flights of stairs? Yes    Can you bring groceries from the car to the house? No     Can you scrub the floors or lift heavy furniture? No    Can you participate in moderate recreational activities?  Yes      Measurement of Exercise Tolerance (METs Score): >4 METS     METs Score before Surgery >4:

## 2025-05-20 ENCOUNTER — HOSPITAL ENCOUNTER (OUTPATIENT)
Facility: HOSPITAL | Age: 70
Discharge: HOME | End: 2025-05-23
Payer: MEDICARE

## 2025-05-20 VITALS
DIASTOLIC BLOOD PRESSURE: 87 MMHG | OXYGEN SATURATION: 92 % | RESPIRATION RATE: 16 BRPM | BODY MASS INDEX: 25.82 KG/M2 | HEART RATE: 59 BPM | SYSTOLIC BLOOD PRESSURE: 138 MMHG | WEIGHT: 151.24 LBS | HEIGHT: 64 IN | TEMPERATURE: 97.2 F

## 2025-05-20 LAB
ANION GAP SERPL CALC-SCNC: 2 MMOL/L (ref 2–12)
BUN SERPL-MCNC: 14 MG/DL (ref 6–20)
BUN/CREAT SERPL: 21 (ref 12–20)
CALCIUM SERPL-MCNC: 9.5 MG/DL (ref 8.5–10.1)
CHLORIDE SERPL-SCNC: 104 MMOL/L (ref 97–108)
CO2 SERPL-SCNC: 32 MMOL/L (ref 21–32)
CREAT SERPL-MCNC: 0.67 MG/DL (ref 0.55–1.02)
ERYTHROCYTE [DISTWIDTH] IN BLOOD BY AUTOMATED COUNT: 14 % (ref 11.5–14.5)
GLUCOSE SERPL-MCNC: 93 MG/DL (ref 65–100)
HCT VFR BLD AUTO: 39.6 % (ref 35–47)
HGB BLD-MCNC: 12.8 G/DL (ref 11.5–16)
MCH RBC QN AUTO: 29.7 PG (ref 26–34)
MCHC RBC AUTO-ENTMCNC: 32.3 G/DL (ref 30–36.5)
MCV RBC AUTO: 91.9 FL (ref 80–99)
NRBC # BLD: 0 K/UL (ref 0–0.01)
NRBC BLD-RTO: 0 PER 100 WBC
PLATELET # BLD AUTO: 238 K/UL (ref 150–400)
PMV BLD AUTO: 10.4 FL (ref 8.9–12.9)
POTASSIUM SERPL-SCNC: 4 MMOL/L (ref 3.5–5.1)
RBC # BLD AUTO: 4.31 M/UL (ref 3.8–5.2)
SODIUM SERPL-SCNC: 138 MMOL/L (ref 136–145)
WBC # BLD AUTO: 4.4 K/UL (ref 3.6–11)

## 2025-05-20 PROCEDURE — 85027 COMPLETE CBC AUTOMATED: CPT

## 2025-05-20 PROCEDURE — 80048 BASIC METABOLIC PNL TOTAL CA: CPT

## 2025-05-20 PROCEDURE — 36415 COLL VENOUS BLD VENIPUNCTURE: CPT

## 2025-05-20 ASSESSMENT — ENCOUNTER SYMPTOMS
SHORTNESS OF BREATH: 0
WHEEZING: 0
CHEST TIGHTNESS: 0
SINUS PRESSURE: 0
NAUSEA: 0
RHINORRHEA: 0
COLOR CHANGE: 0
CHOKING: 0
VOICE CHANGE: 0
SINUS PAIN: 0
CONSTIPATION: 0
COUGH: 0
TROUBLE SWALLOWING: 0
SORE THROAT: 0
STRIDOR: 0
APNEA: 0

## 2025-05-20 NOTE — DISCHARGE INSTRUCTIONS
[facility]                   [address] [place] [address]   PRE-ADMISSION TESTING    [phone]     Surgery Date:  Tuesday 6/3/25         INSTRUCTIONS BEFORE YOUR SURGERY   Arrival Time [facility] Pre-op staff will call you between 3 and 7pm the day before your surgery with your arrival time. If your surgery is on a Monday, they will call you the Friday before. If it's after 7pm the day prior to surgery and you have not received a time yet, please call [phone].   Where to Check In   Come through the Main Hospital entrance. Take the elevators on the left side of the lobby to the 2nd floor. The admitting desk will be on your right.     Please bring the following items to register:  photo ID, insurance card, co-pay if needed, medical directive, DNR, and/or POA if applicable.     Free  parking is available 7am to 5pm.   Food Drink Alcohol Marijuana    No food or drink (gum, mints, coffee, juice, etc) after midnight the night before surgery.      No alcohol (beer, wine, liquor) or marijuana 24 hours before or after surgery.           You may drink WATER ONLY up until 2 hours prior to your surgery time. Please do not      add anything to your water as this may result in your surgery being postponed.          PRE-OP Medication Instructions      MEDICATIONS TO TAKE THE MORNING OF SURGERY:   Albuterol if needed (bring on day of surgery)  Buspirone  Gabapentin  Levothyroxine  zoloft                  SPECIAL INSTRUCTIONS:    PAT to contact you with instructions for acamprosate       Medications to STOP 7 Days Before Surgery Non-Steroidal anti-inflammatory Drugs (NSAID's): for example: Ibuprofen, Advil, Motrin, Naproxen, Aleve    Weight loss and/or diabetic GLP1 medications (Wegovy, Ozempic, Semaglutide, Trulicity, Mounjaro, Zepbound, Tirzepatide, etc)  Herbal supplements, vitamins, and fish oil  Other:   Blood Thinners If you take Aspirin, Plavix, Coumadin, Eliquis, Xarelto,

## 2025-05-23 NOTE — PERIOP NOTE
Patient called in to say that her cardiologist, Nahid Avelar NP has given verbal instructions to stop Aspirin 7 days prior to surgery. Message sent via Epic prior to patient's call for Aspirin plan. Notified patient we would notify her if his written instructions to us were any different. Patient verbalized understanding.

## 2025-06-02 ENCOUNTER — ANESTHESIA EVENT (OUTPATIENT)
Facility: HOSPITAL | Age: 70
End: 2025-06-02
Payer: MEDICARE

## 2025-06-02 NOTE — PERIOP NOTE
Patient called to report that she \"developed a cold on Friday (5/30/25) and have a cough and stuffy nose\". Denies fever. Patient denies productive cough. Did not test for COVID. She reports that she left a message at Dr. Bernal's office to inform them. Left message at Dr. Pereyra's office as well to see if surgery will proceed or if it will be delayed.

## 2025-06-02 NOTE — PERIOP NOTE
Hello,     You are scheduled to have surgery tomorrow at [facility].     We would like for you to arrive at  0600 am  We are located on the second floor, [address]. You will check-in at the registration desk located outside the elevators on the second floor prior to proceeding to [address].  Remember nothing to eat or drink after midnight. If you need to take medications the morning of surgery, please take with a few sips of water.   Wear loose, comfortable clothing and leave all your jewelry at home.   You may bring your cell phone with you.  One family member will be allowed in the pre-op area once you are dressed and your IV has been started.   You will need someone to drive you home and be with you for 24 hours post-anesthesia.     We look forward to seeing you! Call [phone] for questions after hours and [phone] between 5:30AM and 6PM.     Thanks!    [facility] PREOP TEAM

## 2025-06-03 ENCOUNTER — ANESTHESIA (OUTPATIENT)
Facility: HOSPITAL | Age: 70
End: 2025-06-03
Payer: MEDICARE

## 2025-06-03 ENCOUNTER — HOSPITAL ENCOUNTER (OUTPATIENT)
Facility: HOSPITAL | Age: 70
Setting detail: OUTPATIENT SURGERY
Discharge: HOME | End: 2025-06-03
Attending: ORTHOPAEDIC SURGERY | Admitting: ORTHOPAEDIC SURGERY
Payer: MEDICARE

## 2025-06-03 VITALS
RESPIRATION RATE: 22 BRPM | OXYGEN SATURATION: 94 % | WEIGHT: 150.79 LBS | HEART RATE: 75 BPM | SYSTOLIC BLOOD PRESSURE: 136 MMHG | BODY MASS INDEX: 25.88 KG/M2 | TEMPERATURE: 99.3 F | DIASTOLIC BLOOD PRESSURE: 64 MMHG

## 2025-06-03 PROCEDURE — 3700000000 HC ANESTHESIA ATTENDED CARE: Performed by: ORTHOPAEDIC SURGERY

## 2025-06-03 PROCEDURE — 2709999900 HC NON-CHARGEABLE SUPPLY: Performed by: ORTHOPAEDIC SURGERY

## 2025-06-03 PROCEDURE — 6360000002 HC RX W HCPCS: Performed by: ORTHOPAEDIC SURGERY

## 2025-06-03 PROCEDURE — 7100000010 HC PHASE II RECOVERY - FIRST 15 MIN: Performed by: ORTHOPAEDIC SURGERY

## 2025-06-03 PROCEDURE — 7100000011 HC PHASE II RECOVERY - ADDTL 15 MIN: Performed by: ORTHOPAEDIC SURGERY

## 2025-06-03 PROCEDURE — 6360000002 HC RX W HCPCS: Performed by: NURSE ANESTHETIST, CERTIFIED REGISTERED

## 2025-06-03 PROCEDURE — 3600000003 HC SURGERY LEVEL 3 BASE: Performed by: ORTHOPAEDIC SURGERY

## 2025-06-03 PROCEDURE — 2500000003 HC RX 250 WO HCPCS: Performed by: ORTHOPAEDIC SURGERY

## 2025-06-03 PROCEDURE — 88305 TISSUE EXAM BY PATHOLOGIST: CPT

## 2025-06-03 PROCEDURE — 3600000013 HC SURGERY LEVEL 3 ADDTL 15MIN: Performed by: ORTHOPAEDIC SURGERY

## 2025-06-03 PROCEDURE — 6370000000 HC RX 637 (ALT 250 FOR IP): Performed by: ORTHOPAEDIC SURGERY

## 2025-06-03 PROCEDURE — 2580000003 HC RX 258: Performed by: ANESTHESIOLOGY

## 2025-06-03 PROCEDURE — 3700000001 HC ADD 15 MINUTES (ANESTHESIA): Performed by: ORTHOPAEDIC SURGERY

## 2025-06-03 RX ORDER — BETAMETHASONE SODIUM PHOSPHATE AND BETAMETHASONE ACETATE 3; 3 MG/ML; MG/ML
INJECTION, SUSPENSION INTRA-ARTICULAR; INTRALESIONAL; INTRAMUSCULAR; SOFT TISSUE PRN
Status: DISCONTINUED | OUTPATIENT
Start: 2025-06-03 | End: 2025-06-03 | Stop reason: HOSPADM

## 2025-06-03 RX ORDER — FENTANYL CITRATE 50 UG/ML
100 INJECTION, SOLUTION INTRAMUSCULAR; INTRAVENOUS
Status: DISCONTINUED | OUTPATIENT
Start: 2025-06-03 | End: 2025-06-03 | Stop reason: HOSPADM

## 2025-06-03 RX ORDER — DIPHENHYDRAMINE HYDROCHLORIDE 50 MG/ML
12.5 INJECTION, SOLUTION INTRAMUSCULAR; INTRAVENOUS
Status: DISCONTINUED | OUTPATIENT
Start: 2025-06-03 | End: 2025-06-03 | Stop reason: HOSPADM

## 2025-06-03 RX ORDER — MIDAZOLAM HYDROCHLORIDE 1 MG/ML
INJECTION, SOLUTION INTRAMUSCULAR; INTRAVENOUS
Status: DISCONTINUED | OUTPATIENT
Start: 2025-06-03 | End: 2025-06-03 | Stop reason: SDUPTHER

## 2025-06-03 RX ORDER — LIDOCAINE HYDROCHLORIDE 10 MG/ML
1 INJECTION, SOLUTION EPIDURAL; INFILTRATION; INTRACAUDAL; PERINEURAL
Status: DISCONTINUED | OUTPATIENT
Start: 2025-06-03 | End: 2025-06-03 | Stop reason: HOSPADM

## 2025-06-03 RX ORDER — PROPOFOL 10 MG/ML
INJECTION, EMULSION INTRAVENOUS
Status: DISCONTINUED | OUTPATIENT
Start: 2025-06-03 | End: 2025-06-03 | Stop reason: SDUPTHER

## 2025-06-03 RX ORDER — NALOXONE HYDROCHLORIDE 0.4 MG/ML
INJECTION, SOLUTION INTRAMUSCULAR; INTRAVENOUS; SUBCUTANEOUS PRN
Status: DISCONTINUED | OUTPATIENT
Start: 2025-06-03 | End: 2025-06-03 | Stop reason: HOSPADM

## 2025-06-03 RX ORDER — ONDANSETRON 2 MG/ML
4 INJECTION INTRAMUSCULAR; INTRAVENOUS
Status: DISCONTINUED | OUTPATIENT
Start: 2025-06-03 | End: 2025-06-03 | Stop reason: HOSPADM

## 2025-06-03 RX ORDER — SODIUM CHLORIDE, SODIUM LACTATE, POTASSIUM CHLORIDE, CALCIUM CHLORIDE 600; 310; 30; 20 MG/100ML; MG/100ML; MG/100ML; MG/100ML
INJECTION, SOLUTION INTRAVENOUS CONTINUOUS
Status: DISCONTINUED | OUTPATIENT
Start: 2025-06-03 | End: 2025-06-03 | Stop reason: HOSPADM

## 2025-06-03 RX ORDER — MIDAZOLAM HYDROCHLORIDE 2 MG/2ML
2 INJECTION, SOLUTION INTRAMUSCULAR; INTRAVENOUS
Status: DISCONTINUED | OUTPATIENT
Start: 2025-06-03 | End: 2025-06-03 | Stop reason: HOSPADM

## 2025-06-03 RX ORDER — FENTANYL CITRATE 50 UG/ML
INJECTION, SOLUTION INTRAMUSCULAR; INTRAVENOUS
Status: DISCONTINUED | OUTPATIENT
Start: 2025-06-03 | End: 2025-06-03 | Stop reason: SDUPTHER

## 2025-06-03 RX ORDER — BACITRACIN ZINC 500 [USP'U]/G
OINTMENT TOPICAL PRN
Status: DISCONTINUED | OUTPATIENT
Start: 2025-06-03 | End: 2025-06-03 | Stop reason: HOSPADM

## 2025-06-03 RX ADMIN — MIDAZOLAM HYDROCHLORIDE 1 MG: 1 INJECTION, SOLUTION INTRAMUSCULAR; INTRAVENOUS at 08:34

## 2025-06-03 RX ADMIN — SODIUM CHLORIDE, SODIUM LACTATE, POTASSIUM CHLORIDE, AND CALCIUM CHLORIDE: .6; .31; .03; .02 INJECTION, SOLUTION INTRAVENOUS at 06:42

## 2025-06-03 RX ADMIN — CEFAZOLIN SODIUM 2000 MG: 1 POWDER, FOR SOLUTION INTRAMUSCULAR; INTRAVENOUS at 08:07

## 2025-06-03 RX ADMIN — MIDAZOLAM HYDROCHLORIDE 1 MG: 1 INJECTION, SOLUTION INTRAMUSCULAR; INTRAVENOUS at 07:56

## 2025-06-03 RX ADMIN — PROPOFOL 50 MCG/KG/MIN: 10 INJECTION, EMULSION INTRAVENOUS at 07:53

## 2025-06-03 RX ADMIN — MIDAZOLAM HYDROCHLORIDE 2 MG: 1 INJECTION, SOLUTION INTRAMUSCULAR; INTRAVENOUS at 07:51

## 2025-06-03 RX ADMIN — FENTANYL CITRATE 50 MCG: 50 INJECTION, SOLUTION INTRAMUSCULAR; INTRAVENOUS at 07:56

## 2025-06-03 RX ADMIN — FENTANYL CITRATE 50 MCG: 50 INJECTION, SOLUTION INTRAMUSCULAR; INTRAVENOUS at 07:51

## 2025-06-03 RX ADMIN — MIDAZOLAM HYDROCHLORIDE 1 MG: 1 INJECTION, SOLUTION INTRAMUSCULAR; INTRAVENOUS at 08:23

## 2025-06-03 ASSESSMENT — PAIN - FUNCTIONAL ASSESSMENT
PAIN_FUNCTIONAL_ASSESSMENT: 0-10
PAIN_FUNCTIONAL_ASSESSMENT: ACTIVITIES ARE NOT PREVENTED

## 2025-06-03 ASSESSMENT — PAIN SCALES - GENERAL
PAINLEVEL_OUTOF10: 0
PAINLEVEL_OUTOF10: 0

## 2025-06-03 ASSESSMENT — PAIN DESCRIPTION - DESCRIPTORS: DESCRIPTORS: ACHING

## 2025-06-03 NOTE — PERIOP NOTE
PACU-IN REPORT FROM ANESTHESIA    Verbal report received from   Maddie   [] MD/-Anesthesiologist    [x] CRNA   [] with student    CHOICE ANESTHESIA:  [] GENERAL  [] TIVA  [x] MAC  [x] LOCAL  [] REGIONAL  [] SPINAL   [] EPIDURAL   **Note the anesthesia record for medications given intraoperatively.**           [] E.R.A.S. PROTOCOL    SURGICAL PROCEDURE: Procedure(s) (LRB):  RIGHT CARPAL TUNNEL RELEASE AND EXCISION OF LIPOMA RIGHT PALMAR HAND AND LEFT CARPAL TUNNEL CORTISONE INJECTION (MAC W/LOCAL) (N/A)     SURGEON: Nahid Pereyra MD.    Brief Initial Visual Assessment:    Patient Age: [] Infant(1-12mo)       []Pediatric(1-13yrs)    [] Adolescent(13-18yrs)     [] Adult(18-65yrs)      [x]Geriatric Adult(>65yrs).                   Patient    [x] Alert           [x]Calm & Cooperative      [] Anxious/Restless      [] Combative  Appearance:  [x] Drowsy      [] Confused/Disoriented     [] Sedated      [] Unresponsive     Oriented x  4            Airway:     [x] Patent          [] \"Difficult Airway\" report by Anesthesia                        [] Obstructs easily/Obstructed on arrival          [] Manual stimulation and/or airway assistance necessary                         [] Airway improved with head/airway repositioning                       Airway Adjuncts Present: [] Oral Airway    [] Nasal Trumpet    [] ETT    [] LMA            Respiratory  [x] Even   [] Labored   [] Shallow   [] Tachypnea (>28 RR/min)  [] Bradypnea (<10 RR/min)  Pattern:    [x] Non-Labored  [] VENT and/or respiratory assistance     being provided.        Skin:     [x] Pink [] Dusky    [] Pale         [x] Warm     [] Hot [] Cool       [] Cold    [x]Dry     [] Moist [] Diaphoretic     Membranes:  [x] Pink    [] Pale        [x] Moist [] Dry     [] Crusty     Pain:   [x] No Acute Discomfort.    0  /10 Scale [x] Verbal Numeric / Visual   [] Moderate Discomfort.      [] V.A.S.    [] Acute Discomfort.                 [] A.N.V.    [] Chronic-Issue Related

## 2025-06-03 NOTE — ANESTHESIA POSTPROCEDURE EVALUATION
Department of Anesthesiology  Postprocedure Note    Patient: Kalina Shah  MRN: [id number]  YOB: 1955  Date of evaluation: 6/3/2025    Procedure Summary       Date: 06/03/25 Room / Location: [facility] OR  / [facility] OR    Anesthesia Start: 0752 Anesthesia Stop: 0853    Procedure: RIGHT CARPAL TUNNEL RELEASE AND EXCISION OF LIPOMA RIGHT PALMAR HAND AND LEFT CARPAL TUNNEL CORTISONE INJECTION (MAC W/LOCAL) (Hand) Diagnosis:       Carpal tunnel syndrome, bilateral      Finger mass, right      (Carpal tunnel syndrome, bilateral [G56.03])      (Finger mass, right [R22.31])    Surgeons: Nahid Pereyra MD Responsible Provider: Lexis Yepez MD    Anesthesia Type: MAC ASA Status: 2            Anesthesia Type: No value filed.    Favian Phase I: Favian Score: 10    Favian Phase II: Favian Score: 10    Anesthesia Post Evaluation    Patient location during evaluation: PACU  Patient participation: complete - patient participated  Level of consciousness: awake  Airway patency: patent  Nausea & Vomiting: no vomiting and no nausea  Cardiovascular status: hemodynamically stable  Respiratory status: acceptable  Hydration status: stable  Pain management: adequate    No notable events documented.

## 2025-06-03 NOTE — DISCHARGE INSTRUCTIONS
SEE DR ENRIQUEZ's PRE-PRINTED INSTRUCTIONS  (COPY MADE TO PAPER CHART RECORD)    ___________________________________________          DISCHARGE SUMMARY from Your Nurse    PATIENT INSTRUCTIONS:    AFTER ANESTHESIA & SEDATION, and WHILE TAKING PAIN MEDICINE  After general anesthesia / intravenous sedation and the 24 hours following, and/or while taking prescription Opiates:  Limit your activities  Do not drive and operate hazardous machinery until you have been of all narcotics and sedatives for over 24 hours  Do not make important personal or business decisions  Do  not drink alcoholic beverages  If you have not urinated within 8 hours after discharge, please contact your surgeon on call.        SIGNS OF INFECTION, THINGS TO REPORT TO YOUR DOCTOR  Report the following Signs of Infection or General Problems after surgery to your surgeon:  Redness, swelling, drainage, pus or odor of or around the surgical area  Excessive pain not relieved, or discomfort not improved by the medications prescribed by your doctor  Fever/ temperature over 101; Temperature over 100 if on medications (chemotherapy or medicines which affect your ability to fight infections)  Nausea and vomiting lasting longer than 4 hours or if unable to take medications  Any signs of decreased circulation or nerve impairment to extremity: change in color, persistent  numbness, tingling, coldness or increase pain  If you have any questions.      GOOD HELP TO FIGHT AN INFECTION  Here are a few tip to help reduce the chance of getting an infection after surgery:  Wash Your Hands  Good handwashing is the most important thing you and your caregiver can do.  Wash before and after caring for any wounds.  Dry your hand with a clean towel.  Wash with soap and water for at least 20 seconds. A TIP: sing the \"Happy Birthday\" song through one time while washing to help with the timing.  Use a hand  in between washings.    Shower  When your surgeon says it is OK

## 2025-06-03 NOTE — BRIEF OP NOTE
Brief Postoperative Note      Patient: Kalina Shah  YOB: 1955  MRN: [id number]    Date of Procedure: 6/3/2025    Pre-Op Diagnosis Codes:      * Carpal tunnel syndrome, bilateral [G56.03]     * Finger mass, right [R22.31]    Post-Op Diagnosis: Same       Procedure(s):  RIGHT CARPAL TUNNEL RELEASE AND EXCISION OF LIPOMA RIGHT PALMAR HAND AND LEFT CARPAL TUNNEL CORTISONE INJECTION (MAC W/LOCAL)    Surgeon(s):  Nahid Pereyra MD    Assistant:  Physician Assistant: Janet Leiva PA    Anesthesia: Monitor Anesthesia Care    Estimated Blood Loss (mL): Minimal    Complications: None    Specimens:   * No specimens in log *    Implants:  * No implants in log *      Drains: * No LDAs found *    Findings:  Infection Present At Time Of Surgery (PATOS) (choose all levels that have infection present):  No infection present  Other Findings: Right CTS and right hand mass    Electronically signed by RIDDHI Walters on 6/3/2025 at 8:10 AM

## 2025-06-03 NOTE — PERIOP NOTE
POST ANESTHESIA CARE    DISCHARGE / TRANSFER NOTE  Kalina Shah was:    [x] discharged        via   [x] Wheelchair          to [x] Private Vehicle     [] transferred    [] Carried    [] Taxi / Vehicle \"for Hire\"  [] Walk out   [] Ambulance / Medical Transportation   [] Stretcher   [] Hospital room _**_           [] Bed      Patient was escorted by:      [x] Nurse   [] Volunteer  [] Transporter / Technician  [] Parent      [] Spouse / Family /      Patient verbalized     [x] appreciation and was very pleased with care received   [] frustration with care received       throughout their stay.    Patient was discharged in     [x] pleasant mood  [] sad mood  [] mad mood .     Pain at discharge/transfer was      0  /10.    Discharge, medication and follow-up instructions were verbalized as understood prior to discharge  (if applicable for same-day procedures being discharged.)    All personal belongings have been returned to patient, and patient/family verbally confirm receiving belongings as all present.

## 2025-06-03 NOTE — ANESTHESIA PRE PROCEDURE
Department of Anesthesiology  Preprocedure Note       Name:  Kalina Shah   Age:  69 y.o.  :  1955                                          MRN:  [id number]         Date:  6/3/2025      Surgeon: Surgeon(s):  Nahid Pereyra MD    Procedure: Procedure(s):  RIGHT CARPAL TUNNEL RELEASE AND EXCISION OF LIPOMA RIGHT PALMAR HAND AND LEFT CARPAL TUNNEL CORTISONE INJECTION (MAC W/LOCAL)    Medications prior to admission:   Prior to Admission medications    Medication Sig Start Date End Date Taking? Authorizing Provider   ACAMPROSATE CALCIUM PO Take 2 tablets by mouth in the morning, at noon, and at bedtime   Yes Caity Paz MD   sertraline (ZOLOFT) 25 MG tablet Take 1 tablet by mouth daily 3/4/25  Yes Josué Goodrich MD   busPIRone (BUSPAR) 10 MG tablet TAKE 1 TABLET BY MOUTH TWICE DAILY 24  Yes Josué Goodrich MD   atorvastatin (LIPITOR) 40 MG tablet Take 1 tablet by mouth nightly 24  Yes Nahid Avelar APRN - NP   Magnesium 400 MG CAPS Take 400 mg by mouth every evening   Yes ProviderCaity MD   gabapentin (NEURONTIN) 300 MG capsule Take 2 capsules by mouth 3 times daily. 24  Yes Jousé Goodrich MD   levothyroxine (SYNTHROID) 25 MCG tablet Take 1 tablet by mouth daily 9/3/24  Yes Cecilia Hernandez MD   albuterol sulfate (PROAIR RESPICLICK) 108 (90 Base) MCG/ACT aerosol powder inhalation Inhale 2 puffs into the lungs every 4 hours as needed for Wheezing or Shortness of Breath   Yes ProviderCaity MD   vitamin B-12 (CYANOCOBALAMIN) 1000 MCG tablet Take 1 tablet by mouth every evening   Yes Caity Paz MD   nitroGLYCERIN (NITROSTAT) 0.4 MG SL tablet Place 1 tablet under the tongue every 5 minutes as needed for Chest pain up to max of 3 total doses. If no relief after 1 dose, call 911. 23  Yes Nahid Avelar APRN - NP   calcium carb-cholecalciferol 600-10 MG-MCG TABS per tab Take 1 tablet by mouth nightly   Yes Automatic Reconciliation, Ar   Romosozumab-[id number]

## 2025-06-10 ENCOUNTER — OFFICE VISIT (OUTPATIENT)
Age: 70
End: 2025-06-10

## 2025-06-10 VITALS
BODY MASS INDEX: 25.75 KG/M2 | RESPIRATION RATE: 18 BRPM | HEART RATE: 59 BPM | DIASTOLIC BLOOD PRESSURE: 86 MMHG | OXYGEN SATURATION: 96 % | WEIGHT: 150 LBS | SYSTOLIC BLOOD PRESSURE: 132 MMHG | TEMPERATURE: 98.6 F

## 2025-06-10 DIAGNOSIS — R05.1 ACUTE COUGH: ICD-10-CM

## 2025-06-10 DIAGNOSIS — J20.8 VIRAL BRONCHITIS: Primary | ICD-10-CM

## 2025-06-10 RX ORDER — INHALER, ASSIST DEVICES
1 SPACER (EA) MISCELLANEOUS PRN
Qty: 1 EACH | Refills: 0 | Status: SHIPPED | OUTPATIENT
Start: 2025-06-10

## 2025-06-10 RX ORDER — ALBUTEROL SULFATE 90 UG/1
2 INHALANT RESPIRATORY (INHALATION) 4 TIMES DAILY PRN
Qty: 18 G | Refills: 0 | Status: SHIPPED | OUTPATIENT
Start: 2025-06-10

## 2025-06-10 NOTE — PROGRESS NOTES
6/10/2025   Kalina Shah (: 1955) is a 69 y.o. female, New patient, here for evaluation of the following chief complaint(s):  Cough (C/o cough. Sx 10 days.)     ASSESSMENT/PLAN:  Below is the assessment and plan developed based on review of pertinent history, physical exam, labs, studies, and medications.  Assessment & Plan  Viral bronchitis          Exam and history consistent with viral bronchitis.  -Albuterol inhaler every 6 hours as needed for coughing fits/shortness of breath   -Increase fluid intake to maintain hydration and to help fight infection. Please drink at least 64 ounces of fluid a day  -Ibuprofen 600 mg every 6 hours as needed and Tylenol 500 mg every 6 hours as needed for fever/pain  -Mucinex DM, Tylenol severe cold and flu, or DayQuil for symptomatic relief and decongestion  -1 tablespoon of honey or mixed with hot tea for help with cough.   -Steam inhalation, warm compresses, humidifier, and warm soup can also help  -Please follow-up with your PCP in the next 2 to 4 weeks    If symptoms persist or worsen, please contact PCP and/or return to urgent care.    Acute cough                  Handout given with care instructions  2. OTC for symptom management. Increase fluid intake, ensure adequate nutritional intake.  3. Follow up with PCP as needed.  4. Go to ED with development of any acute symptoms.     Follow up:  No follow-ups on file.  Follow up immediately for any new, worsening or changes or if symptoms are not improving over the next 5-7 days.     SUBJECTIVE/OBJECTIVE:    Cough       Ms Shah presents with her daughter with concern for cough and congestion for the last week. Her daughter states that she had a low grade fever last Tuesday of 99 degrees Farenheit. The cough has persisted to this week and she is also bringing up green mucous. Otherwise, she denies any shortness of breath, chest pain, nausea, vomiting, diarrhea, or other systemic complaints or concerns at this

## 2025-06-10 NOTE — PATIENT INSTRUCTIONS
Exam and history consistent with viral bronchitis.  -Albuterol inhaler every 6 hours as needed for coughing fits/shortness of breath   -Increase fluid intake to maintain hydration and to help fight infection. Please drink at least 64 ounces of fluid a day  -Ibuprofen 600 mg every 6 hours as needed and Tylenol 500 mg every 6 hours as needed for fever/pain  -Mucinex DM, Tylenol severe cold and flu, or DayQuil for symptomatic relief and decongestion  -1 tablespoon of honey or mixed with hot tea for help with cough.   -Steam inhalation, warm compresses, humidifier, and warm soup can also help  -Please follow-up with your PCP in the next 2 to 4 weeks    If symptoms persist or worsen, please contact PCP and/or return to urgent care.

## 2025-06-17 ENCOUNTER — OFFICE VISIT (OUTPATIENT)
Age: 70
End: 2025-06-17

## 2025-06-17 VITALS
BODY MASS INDEX: 25.75 KG/M2 | OXYGEN SATURATION: 97 % | WEIGHT: 150 LBS | TEMPERATURE: 98.1 F | SYSTOLIC BLOOD PRESSURE: 123 MMHG | DIASTOLIC BLOOD PRESSURE: 76 MMHG | HEART RATE: 80 BPM | RESPIRATION RATE: 18 BRPM

## 2025-06-17 DIAGNOSIS — J40 BRONCHITIS: Primary | ICD-10-CM

## 2025-06-17 LAB
Lab: NORMAL
PERFORMING INSTRUMENT: NORMAL
QC PASS/FAIL: NORMAL
SARS-COV-2, POC: NORMAL

## 2025-06-17 RX ORDER — AZITHROMYCIN 500 MG/1
500 TABLET, FILM COATED ORAL DAILY
Qty: 3 TABLET | Refills: 0 | Status: SHIPPED | OUTPATIENT
Start: 2025-06-17 | End: 2025-06-20

## 2025-06-17 NOTE — PROGRESS NOTES
her mother; Prostate Cancer in her father.  Social: Pt   Social History     Socioeconomic History    Marital status:      Spouse name: Not on file    Number of children: Not on file    Years of education: Not on file    Highest education level: Not on file   Occupational History    Not on file   Tobacco Use    Smoking status: Former     Current packs/day: 0.00     Average packs/day: 1 pack/day for 53.0 years (53.0 ttl pk-yrs)     Types: Cigarettes     Start date:      Quit date:      Years since quittin.4     Passive exposure: Past    Smokeless tobacco: Never    Tobacco comments:     Quit smoking cigarettes 3/2023   Vaping Use    Vaping status: Never Used   Substance and Sexual Activity    Alcohol use: Not Currently     Comment: /2-3 glass of wine per week per pt on 3/27/2024    Drug use: Never    Sexual activity: Not Currently     Birth control/protection: Abstinence, Post-menopausal   Other Topics Concern    Not on file   Social History Narrative    Not on file     Social Drivers of Health     Financial Resource Strain: Low Risk  (2024)    Overall Financial Resource Strain (CARDIA)     Difficulty of Paying Living Expenses: Not hard at all   Food Insecurity: No Food Insecurity (3/18/2025)    Hunger Vital Sign     Worried About Running Out of Food in the Last Year: Never true     Ran Out of Food in the Last Year: Never true   Transportation Needs: No Transportation Needs (3/18/2025)    PRAPARE - Transportation     Lack of Transportation (Medical): No     Lack of Transportation (Non-Medical): No   Physical Activity: Sufficiently Active (3/18/2025)    Exercise Vital Sign     Days of Exercise per Week: 4 days     Minutes of Exercise per Session: 60 min   Stress: No Stress Concern Present (2025)    [employer] - Occupational Stress Questionnaire     Feeling of Stress : Only a little   Social Connections: Moderately Isolated (2025)    Social Connection and

## 2025-06-25 ENCOUNTER — TRANSCRIBE ORDERS (OUTPATIENT)
Facility: HOSPITAL | Age: 70
End: 2025-06-25

## 2025-06-25 DIAGNOSIS — S92.321A CLOSED DISPLACED FRACTURE OF SECOND METATARSAL BONE OF RIGHT FOOT, INITIAL ENCOUNTER: Primary | ICD-10-CM

## 2025-06-27 ENCOUNTER — HOSPITAL ENCOUNTER (OUTPATIENT)
Facility: HOSPITAL | Age: 70
Discharge: HOME | End: 2025-06-30
Attending: ORTHOPAEDIC SURGERY
Payer: MEDICARE

## 2025-06-27 DIAGNOSIS — S92.321A CLOSED DISPLACED FRACTURE OF SECOND METATARSAL BONE OF RIGHT FOOT, INITIAL ENCOUNTER: ICD-10-CM

## 2025-06-27 PROCEDURE — 73718 MRI LOWER EXTREMITY W/O DYE: CPT

## 2025-07-17 DIAGNOSIS — E03.9 ACQUIRED HYPOTHYROIDISM: ICD-10-CM

## 2025-07-18 RX ORDER — LEVOTHYROXINE SODIUM 25 UG/1
25 TABLET ORAL DAILY
Qty: 90 TABLET | Refills: 3 | Status: SHIPPED | OUTPATIENT
Start: 2025-07-18

## 2025-08-15 ENCOUNTER — TELEPHONE (OUTPATIENT)
Age: 70
End: 2025-08-15

## 2025-08-15 DIAGNOSIS — R07.9 CHEST PAIN WITH HIGH RISK FOR CARDIAC ETIOLOGY: ICD-10-CM

## 2025-08-15 RX ORDER — ATORVASTATIN CALCIUM 40 MG/1
40 TABLET, FILM COATED ORAL NIGHTLY
Qty: 90 TABLET | Refills: 3 | Status: SHIPPED | OUTPATIENT
Start: 2025-08-15

## (undated) DEVICE — PADPRO DEFIBRILLATION/PACING/CARDIOVERSION/MONITORING ELECTRODES, ADULT/CHILD GREATER THAN 10 KG RADIOTRANSPARENT ELECTRODE, PHYSIO-CONTROL QUIK-COMBO (M) 60" (152 CM): Brand: PADPRO

## (undated) DEVICE — ZIMMER® STERILE DISPOSABLE TOURNIQUET CUFF WITH PROTECTIVE SLEEVE AND PLC, DUAL PORT, SINGLE BLADDER, 18 IN. (46 CM)

## (undated) DEVICE — GLOVE SURG SZ 75 L12IN FNGR THK94MIL STD WHT LTX FREE

## (undated) DEVICE — KIT MED IMAG CNTRST AGNT W/ IOPAMIDOL REUSE

## (undated) DEVICE — BLADE OPHTH 180DEG CUT SURF BLU STR SHRP DBL BVL GRINDLESS

## (undated) DEVICE — CATHETER DIAG 5FR L100CM LUMN ID0.047IN JL3.5 CRV 0 SIDE H

## (undated) DEVICE — ANGIOGRAPHY KIT

## (undated) DEVICE — DRESSING PETRO W3XL3IN OIL EMUL N ADH GZ KNIT IMPREG CELOS

## (undated) DEVICE — PROVE COVER: Brand: UNBRANDED

## (undated) DEVICE — SOLUTION IRRIG 1000ML H2O PIC PLAS SHATTERPROOF CONTAINER

## (undated) DEVICE — GUIDEWIRE VASC J 3 MM 0.035 INX210 CM FIX COR INQWIRE

## (undated) DEVICE — GLOVE ORANGE PI 7   MSG9070

## (undated) DEVICE — KIT AT-X65 ANGIOTOUCH HAND CONTROLLER

## (undated) DEVICE — SUTURE NONABSORBABLE MONOFILAMENT 4-0 PS-2 18 IN BLK ETHILON 1667G

## (undated) DEVICE — KIT MFLD ISOLATN NACL CNTRST PRT TBNG SPIK W/ PRSS TRNSDUC

## (undated) DEVICE — KIT HND CTRL 3 W STPCOCK ROT END 54IN PREM HI PRSS TBNG AT

## (undated) DEVICE — WRAP COHESIVE W2INXL5YD TAN SELF ADH BNDG HND NON STERILE TEAR CARING

## (undated) DEVICE — GLOVE SURG SZ 7 L12IN FNGR THK79MIL GRN LTX FREE

## (undated) DEVICE — DRESSING GZ FLUF 36X36 IN RND 2 PLY PD GZ AMER WHT CROSS

## (undated) DEVICE — PACK PROCEDURE SURG HRT CATH

## (undated) DEVICE — PADDING CAST W3INXL4YD COT BLEND MIC PLEAT UNDERCAST SPEC

## (undated) DEVICE — SPONGE GZ W4XL4IN COT 12 PLY TYP VII WVN C FLD DSGN STERILE

## (undated) DEVICE — HI-TORQUE VERSACORE MODIFIED J GUIDE WIRE SYSTEM 145 CM: Brand: HI-TORQUE VERSACORE

## (undated) DEVICE — SPECIAL PROCEDURE DRAPE 32" X 34": Brand: SPECIAL PROCEDURE DRAPE

## (undated) DEVICE — BAND COMPR L24CM REG CLR PLAS HEMSTAT EXT HK AND LOOP RETEN

## (undated) DEVICE — HYPODERMIC SAFETY NEEDLE: Brand: MONOJECT

## (undated) DEVICE — CATHETER DIAG 5FR L100CM LUMN ID0.047IN JR4 CRV 0 SIDE H

## (undated) DEVICE — GLIDESHEATH SLENDER ACCESS KIT: Brand: GLIDESHEATH SLENDER

## (undated) DEVICE — SYRINGE MED 5ML STD CLR PLAS LUERLOCK TIP N CTRL DISP

## (undated) DEVICE — HAND-SFMCASU: Brand: MEDLINE INDUSTRIES, INC.

## (undated) DEVICE — SPLINT WR VELC FOAM NEUT POS DISP FOR RAD ART ACC SFT STRP